# Patient Record
Sex: MALE | Race: WHITE | Employment: OTHER | URBAN - METROPOLITAN AREA
[De-identification: names, ages, dates, MRNs, and addresses within clinical notes are randomized per-mention and may not be internally consistent; named-entity substitution may affect disease eponyms.]

---

## 2017-09-20 ENCOUNTER — APPOINTMENT (OUTPATIENT)
Dept: GENERAL RADIOLOGY | Age: 77
End: 2017-09-20
Attending: EMERGENCY MEDICINE
Payer: MEDICARE

## 2017-09-20 ENCOUNTER — HOSPITAL ENCOUNTER (EMERGENCY)
Age: 77
Discharge: HOME | End: 2017-09-20
Attending: EMERGENCY MEDICINE
Payer: MEDICARE

## 2017-09-20 VITALS
SYSTOLIC BLOOD PRESSURE: 191 MMHG | OXYGEN SATURATION: 100 % | HEIGHT: 72 IN | DIASTOLIC BLOOD PRESSURE: 67 MMHG | TEMPERATURE: 97.9 F | RESPIRATION RATE: 16 BRPM | WEIGHT: 220 LBS | BODY MASS INDEX: 29.8 KG/M2 | HEART RATE: 50 BPM

## 2017-09-20 DIAGNOSIS — S81.002A OPEN KNEE WOUND, LEFT, INITIAL ENCOUNTER: Primary | ICD-10-CM

## 2017-09-20 LAB
ANION GAP SERPL CALC-SCNC: 6 MMOL/L (ref 3–18)
BASOPHILS # BLD: 0 K/UL (ref 0–0.06)
BASOPHILS NFR BLD: 0 % (ref 0–2)
BUN SERPL-MCNC: 26 MG/DL (ref 7–18)
BUN/CREAT SERPL: 20 (ref 12–20)
CALCIUM SERPL-MCNC: 9.5 MG/DL (ref 8.5–10.1)
CHLORIDE SERPL-SCNC: 103 MMOL/L (ref 100–108)
CO2 SERPL-SCNC: 30 MMOL/L (ref 21–32)
CREAT SERPL-MCNC: 1.31 MG/DL (ref 0.6–1.3)
CRP SERPL-MCNC: 5.5 MG/DL (ref 0–0.3)
DIFFERENTIAL METHOD BLD: ABNORMAL
EOSINOPHIL # BLD: 0.4 K/UL (ref 0–0.4)
EOSINOPHIL NFR BLD: 6 % (ref 0–5)
ERYTHROCYTE [DISTWIDTH] IN BLOOD BY AUTOMATED COUNT: 14.5 % (ref 11.6–14.5)
ERYTHROCYTE [SEDIMENTATION RATE] IN BLOOD: 77 MM/HR (ref 0–20)
GLUCOSE SERPL-MCNC: 107 MG/DL (ref 74–99)
HCT VFR BLD AUTO: 33.2 % (ref 36–48)
HGB BLD-MCNC: 10.7 G/DL (ref 13–16)
LYMPHOCYTES # BLD: 1.9 K/UL (ref 0.9–3.6)
LYMPHOCYTES NFR BLD: 25 % (ref 21–52)
MCH RBC QN AUTO: 28.5 PG (ref 24–34)
MCHC RBC AUTO-ENTMCNC: 32.2 G/DL (ref 31–37)
MCV RBC AUTO: 88.3 FL (ref 74–97)
MONOCYTES # BLD: 0.6 K/UL (ref 0.05–1.2)
MONOCYTES NFR BLD: 9 % (ref 3–10)
NEUTS SEG # BLD: 4.4 K/UL (ref 1.8–8)
NEUTS SEG NFR BLD: 60 % (ref 40–73)
PLATELET # BLD AUTO: 313 K/UL (ref 135–420)
PMV BLD AUTO: 9.1 FL (ref 9.2–11.8)
POTASSIUM SERPL-SCNC: 3.7 MMOL/L (ref 3.5–5.5)
RBC # BLD AUTO: 3.76 M/UL (ref 4.7–5.5)
SODIUM SERPL-SCNC: 139 MMOL/L (ref 136–145)
WBC # BLD AUTO: 7.3 K/UL (ref 4.6–13.2)

## 2017-09-20 PROCEDURE — 99284 EMERGENCY DEPT VISIT MOD MDM: CPT

## 2017-09-20 PROCEDURE — 73590 X-RAY EXAM OF LOWER LEG: CPT

## 2017-09-20 PROCEDURE — 87040 BLOOD CULTURE FOR BACTERIA: CPT | Performed by: EMERGENCY MEDICINE

## 2017-09-20 PROCEDURE — 86140 C-REACTIVE PROTEIN: CPT | Performed by: EMERGENCY MEDICINE

## 2017-09-20 PROCEDURE — 93971 EXTREMITY STUDY: CPT

## 2017-09-20 PROCEDURE — 85652 RBC SED RATE AUTOMATED: CPT | Performed by: EMERGENCY MEDICINE

## 2017-09-20 PROCEDURE — 85025 COMPLETE CBC W/AUTO DIFF WBC: CPT | Performed by: EMERGENCY MEDICINE

## 2017-09-20 PROCEDURE — 80048 BASIC METABOLIC PNL TOTAL CA: CPT | Performed by: EMERGENCY MEDICINE

## 2017-09-20 RX ORDER — CEPHALEXIN 500 MG/1
500 CAPSULE ORAL 4 TIMES DAILY
Qty: 28 CAP | Refills: 0 | Status: SHIPPED | OUTPATIENT
Start: 2017-09-20 | End: 2017-09-27

## 2017-09-20 RX ORDER — ATENOLOL 25 MG/1
50 TABLET ORAL EVERY EVENING
COMMUNITY

## 2017-09-20 NOTE — ED PROVIDER NOTES
[address]  EMERGENCY DEPARTMENT HISTORY AND PHYSICAL EXAM       Date: 9/20/2017   Patient Name: Tita Jacob   YOB: 1940  Medical Record Number: [id number]    History of Presenting Illness     Chief Complaint   Patient presents with    Leg Pain        History Provided By:  patient    Additional History: 10:45 AM   Tita Jacob is a 67 y.o. male who presents to the emergency department C/O left knee swelling onset a few days ago. Associated symptoms include open wound to left knee that had yellow-sharmin purulent drainage, scabbed, then began draining again today. Reports hx of similar infections in RLE, this is first episode of infection in LLE. Had called Dr. Angely Oglesby (PCP), who spoke to Dr. Fabian Baron (vascular surgeon), who referred pt to ED. Pt is supposed to have appointment with Dr. Darius Gauthier for LLE swelling. Pt denies fever, chills, CP, SOB, leg pain, knee pain, and any other Sx or complaints. Primary Care Provider: Coco Laboy MD   Specialist:    Past History     Past Medical History:   Past Medical History:   Diagnosis Date    ARF (acute renal failure) ([facility] [address] 6/3/2013    Cellulitis     Hypertension     Unspecified sleep apnea         Past Surgical History:   Past Surgical History:   Procedure Laterality Date    HX CATARACT REMOVAL      HX ORTHOPAEDIC      bilateral knee replacments        Family History:   History reviewed. No pertinent family history. Social History:   Social History   Substance Use Topics    Smoking status: Never Smoker    Smokeless tobacco: Never Used    Alcohol use No        Allergies: Allergies   Allergen Reactions    Ambien [Zolpidem] Other (comments)     Hallucinations        Review of Systems   Review of Systems   Constitutional: Negative for chills and fever. Respiratory: Negative for shortness of breath. Cardiovascular: Positive for leg swelling. Negative for chest pain.    Musculoskeletal: Negative for arthralgias (left knee) and myalgias (LLE). Skin: Positive for color change and wound (open and draining to left knee). All other systems reviewed and are negative. Physical Exam  Vitals:    09/20/17 1028   BP: 191/67   Pulse: (!) 50   Resp: 16   Temp: 97.9 °F (36.6 °C)   SpO2: 100%   Weight: 99.8 kg (220 lb)   Height: 6' (1.829 m)       Physical Exam   Nursing note and vitals reviewed. Constitutional: Elderly.  Well appearing, no acute distress  Head: Normocephalic, Atraumatic  Neck: Supple  Chest: Normal work of breathing and chest excursion bilaterally  Back: No evidence of trauma or deformity  Extremities: profound LE edema (left>right), LLE medial anterior shin has dime sized area of scabbing with drainage of serous fluid and slight surrounding edema  Skin: Warm and dry  Neuro: Alert and appropriate  Psychiatric: Normal mood and affect        Diagnostic Study Results     Labs -      Recent Results (from the past 12 hour(s))   SED RATE (ESR)    Collection Time: 09/20/17 11:07 AM   Result Value Ref Range    Sed rate, automated 77 (H) 0 - 20 mm/hr   METABOLIC PANEL, BASIC    Collection Time: 09/20/17 11:09 AM   Result Value Ref Range    Sodium 139 136 - 145 mmol/L    Potassium 3.7 3.5 - 5.5 mmol/L    Chloride 103 100 - 108 mmol/L    CO2 30 21 - 32 mmol/L    Anion gap 6 3.0 - 18 mmol/L    Glucose 107 (H) 74 - 99 mg/dL    BUN 26 (H) 7.0 - 18 MG/DL    Creatinine 1.31 (H) 0.6 - 1.3 MG/DL    BUN/Creatinine ratio 20 12 - 20      GFR est AA >60 >60 ml/min/1.73m2    GFR est non-AA 53 (L) >60 ml/min/1.73m2    Calcium 9.5 8.5 - 10.1 MG/DL   CBC WITH AUTOMATED DIFF    Collection Time: 09/20/17 11:09 AM   Result Value Ref Range    WBC 7.3 4.6 - 13.2 K/uL    RBC 3.76 (L) 4.70 - 5.50 M/uL    HGB 10.7 (L) 13.0 - 16.0 g/dL    HCT 33.2 (L) 36.0 - 48.0 %    MCV 88.3 74.0 - 97.0 FL    MCH 28.5 24.0 - 34.0 PG    MCHC 32.2 31.0 - 37.0 g/dL    RDW 14.5 11.6 - 14.5 %    PLATELET 950 521 - 914 K/uL    MPV 9.1 (L) 9.2 - 11.8 FL    NEUTROPHILS 60 40 - 73 %    LYMPHOCYTES 25 21 - 52 %    MONOCYTES 9 3 - 10 %    EOSINOPHILS 6 (H) 0 - 5 %    BASOPHILS 0 0 - 2 %    ABS. NEUTROPHILS 4.4 1.8 - 8.0 K/UL    ABS. LYMPHOCYTES 1.9 0.9 - 3.6 K/UL    ABS. MONOCYTES 0.6 0.05 - 1.2 K/UL    ABS. EOSINOPHILS 0.4 0.0 - 0.4 K/UL    ABS. BASOPHILS 0.0 0.0 - 0.06 K/UL    DF AUTOMATED         Radiologic Studies -  The following have been ordered and reviewed:   DUPLEX LOWER EXT VENOUS LEFT   INTERPRETATION/FINDINGS    Left le. Deep vein(s) visualized include the common femoral, deep femoral, proximal femoral, mid femoral, distal femoral, popliteal(above knee), popliteal(fossa) and popliteal(below knee) veins. 2. No evidence of deep venous thrombosis detected in the veins visualized. 3. No evidence of deep vein thrombosis in the contralateral common femoral vein. 4. Superficial vein(s) visualized include the great saphenous vein. 5. No evidence of superficial thrombosis detected. 6. Left calf vessels not seen secondary to swelling     ADDITIONAL COMMENTS  Incidental finding: Prominent left groin lymph node noted measuring  4.6 x 2.9 x 1.6cm    As read by the radiologist.       XR TIB/FIB LT   Final Result   IMPRESSION:     1. Evidence of total knee arthroplasty since 2011. Diffuse soft tissue swelling with additional abnormal perihardware lucency and ill-defined cortex suspicious for infection, especially given history of drainage. No postoperative comparison studies. Clinical correlation is recommended.     2. No fracture or dislocation. As read by the radiologist.            Medical Decision Making   I am the first provider for this patient. I reviewed the vital signs, available nursing notes, past medical history, past surgical history, family history and social history. Vital Signs-Reviewed the patient's vital signs.    Patient Vitals for the past 12 hrs:   Temp Pulse Resp BP SpO2   17 1028 97.9 °F (36.6 °C) (!) 50 16 191/67 100 %       Pulse Oximetry Analysis - Normal 100% on room air     Old Medical Records: Nursing notes. Procedures:   Procedures    ED Course:  10:45 AM  Initial assessment performed. The patients presenting problems have been discussed, and they are in agreement with the care plan formulated and outlined with them. I have encouraged them to ask questions as they arise throughout their visit. 12:12 PM Discussed with radiologist pts imaging. There is concern for infection around pts hardware in place. Will consult pts orthopedic surgeon. 12:43 PM Discussed patient's history, exam, and available diagnostics results with Aide Ponce PA-C on call for Dr. Jessica Luz (orthopedic surgeon), who will review images with Dr. Julieth Delacruz and call back. 12:54 PM Discussed patient's history, exam, and available diagnostics results with Dr. Jessica Luz, orthopedic surgeon, who states that he has reviewed films and is okay with discharging pt to f/u with his office tomorrow for wound evaluation. Medications Given in the ED:  Medications - No data to display    Discharge Note:  1:21 PM  Pt has been reexamined. Patient has no new complaints, changes, or physical findings. Care plan outlined and precautions discussed. Results were reviewed with the patient. All medications were reviewed with the patient; will d/c home with Keflex. All of pt's questions and concerns were addressed. Patient was instructed and agrees to follow up with Dr. Jessica Luz (ortho), as well as to return to the ED upon further deterioration. Patient is ready to go home. Diagnosis   Clinical Impression:   1. Open knee wound, left, initial encounter         Discussion:  66 y/o male with leg wound. Lab work benign. Imaging concerning for lucency around hardware. Dicussed extensively with orthopedics. Plan for discharge with orthopedic follow up tomorrow in Dr. Jessica Luz office.      Follow-up Information Follow up With Details Comments Contact Info    Francis Gottron, MD Schedule an appointment as soon as possible for a visit For follow up with orthopedics [address]      [facility] EMERGENCY DEPT Go to As needed, if symptoms worsen [address] [address]  [phone]          Current Discharge Medication List      START taking these medications    Details   cephALEXin (KEFLEX) 500 mg capsule Take 1 Cap by mouth four (4) times daily for 7 days. Qty: 28 Cap, Refills: 0             _______________________________   Attestations: This note is prepared by Matthew Pang, acting as a Scribe for Mauro Pryor MD on 10:29 AM on 9/20/2017. Mauro Pryor MD: The scribe's documentation has been prepared under my direction and personally reviewed by me in its entirety.   _______________________________

## 2017-09-20 NOTE — DISCHARGE INSTRUCTIONS
Wound Check: Care Instructions  Your Care Instructions  People have wounds that need care for many reasons. You may have a cut that needs care after surgery. You may have a cut or puncture wound from an accident. Or you may have a wound because of a condition like diabetes. Whatever the cause of your wound, there are things you can do to care for it at home. Your doctor may also want you to come back for a wound check. The wound check lets the doctor know how your wound is healing and if you need more treatment. Follow-up care is a key part of your treatment and safety. Be sure to make and go to all appointments, and call your doctor if you are having problems. It's also a good idea to know your test results and keep a list of the medicines you take. How can you care for yourself at home? · If your doctor told you how to care for your wound, follow your doctor's instructions. If you did not get instructions, follow this general advice:  ¨ You may cover the wound with a thin layer of petroleum jelly, such as Vaseline, and a nonstick bandage. ¨ Apply more petroleum jelly and replace the bandage as needed. · Keep the wound dry for the first 24 to 48 hours. After this, you can shower if your doctor okays it. Pat the wound dry. · Be safe with medicines. Read and follow all instructions on the label. ¨ If the doctor gave you a prescription medicine for pain, take it as prescribed. ¨ If you are not taking a prescription pain medicine, ask your doctor if you can take an over-the-counter medicine. · If your doctor prescribed antibiotics, take them as directed. Do not stop taking them just because you feel better. You need to take the full course of antibiotics. · If you have stitches, do not remove them on your own. Your doctor will tell you when to come back to have them removed. · If you have Steri-Strips, leave them on until they fall off.   · If possible, prop up the injured area on a pillow anytime you sit or lie down during the next 3 days. Try to keep it above the level of your heart. This will help reduce swelling. When should you call for help? Call your doctor now or seek immediate medical care if:  · You have new pain, or the pain gets worse. · The skin near the wound is cold or pale or changes color. · You have tingling, weakness, or numbness near the wound. · The wound starts to bleed, and blood soaks through the bandage. Oozing small amounts of blood is normal.  · You have symptoms of infection, such as:  ¨ Increased pain, swelling, warmth, or redness. ¨ Red streaks leading from the wound. ¨ Pus draining from the wound. ¨ A fever. Watch closely for changes in your health, and be sure to contact your doctor if:  · You do not get better as expected. Where can you learn more? Go to http://ronnySnooth Mediarajwinder.info/. Enter P342 in the search box to learn more about \"Wound Check: Care Instructions. \"  Current as of: March 20, 2017  Content Version: 11.3  © 9967-2507 [employer]. Care instructions adapted under license by [employer] (which disclaims liability or warranty for this information). If you have questions about a medical condition or this instruction, always ask your healthcare professional. [address] any warranty or liability for your use of this information. Skin Tears: Care Instructions  Your Care Instructions  As we get older, our skin gets drier and more fragile. Sometimes this can cause the outer layers of skin to split and tear open. Skin tears are treated in different ways. In some cases, doctors use pieces of tape called Steri-Strips to pull the skin together and help it heal. Other times, it's best to leave the tear open and cover it with a special wound-care bandage. Skin tears are usually not serious. They usually heal in a few weeks.  But how long you take to heal depends on your body and the type of tear you have. Sometimes the torn piece of skin is used to protect the wound while it heals. But that piece of skin does not heal. It may fall off on its own. Or the doctor may remove it. As your tear heals, it's important to keep it clean to help prevent infection. The doctor has checked you carefully, but problems can develop later. If you notice any problems or new symptoms, get medical treatment right away. Follow-up care is a key part of your treatment and safety. Be sure to make and go to all appointments, and call your doctor if you are having problems. It's also a good idea to know your test results and keep a list of the medicines you take. How can you care for yourself at home? · If you have pain, ask your doctor if you can take an over-the-counter pain medicine, such as acetaminophen (Tylenol), ibuprofen (Advil, Motrin), or naproxen (Aleve). Be safe with medicines. Read and follow all instructions on the label. · If you have a bandage, follow your doctor's instructions for changing it. · If you have Steri-Strips, leave them on until they fall off. · Follow your doctor's instructions about bathing. · Gently wash the skin tear with plain water 2 times a day. Do not rub the area. · Let the area air dry. Or you can pat it carefully with a soft towel. When should you call for help? Call your doctor now or seek immediate medical care if:  · You have signs of infection, such as:  ¨ Increased pain, swelling, warmth, or redness around the tear. ¨ Red streaks leading from the tear. ¨ Pus draining from the tear. ¨ A fever. · The tear starts to bleed a lot. Small amounts of blood are normal.  Watch closely for changes in your health, and be sure to contact your doctor if:  · You do not get better as expected. Where can you learn more? Go to http://ronny-rajwinder.info/. Enter O051 in the search box to learn more about \"Skin Tears: Care Instructions. \"  Current as of: March 20, 2017  Content Version: 11.3  © 5676-4468 [employer]. Care instructions adapted under license by [employer] (which disclaims liability or warranty for this information). If you have questions about a medical condition or this instruction, always ask your healthcare professional. [address] any warranty or liability for your use of this information.

## 2017-09-20 NOTE — ED TRIAGE NOTES
Left leg swelling and wound with purulent draining. Sepsis Screening completed    (  )Patient meets SIRS criteria. ( x )Patient does not meet SIRS criteria.       SIRS Criteria is achieved when two or more of the following are present   Temperature < 96.8°F (36°C) or > 100.9°F (38.3°C)   Heart Rate > 90 beats per minute   Respiratory Rate > 20 breaths per minute   WBC count > 12,000 or <4,000 or > 10% bands

## 2017-09-20 NOTE — PROCEDURES
[facility]  *** FINAL REPORT ***    Name: Cruz Palacios  MRN: [id number]    Outpatient  : 1940  HIS Order #: [id number]  [address] [facility] Visit #: [id number]  Date: 20 Sep 2017    TYPE OF TEST: Peripheral Venous Testing    REASON FOR TEST  Limb swelling    Left Leg:-  Deep venous thrombosis:           No  Superficial venous thrombosis:    No  Deep venous insufficiency:        Not examined  Superficial venous insufficiency: Not examined      INTERPRETATION/FINDINGS  Duplex images were obtained using 2-D gray scale, color flow, and  spectral Doppler analysis. Left leg :  1. Deep vein(s) visualized include the common femoral, deep femoral,  proximal femoral, mid femoral, distal femoral, popliteal(above knee),  popliteal(fossa) and popliteal(below knee) veins. 2. No evidence of deep venous thrombosis detected in the veins  visualized. 3. No evidence of deep vein thrombosis in the contralateral common  femoral vein. 4. Superficial vein(s) visualized include the great saphenous vein. 5. No evidence of superficial thrombosis detected. 6. Left calf vessels not seen secondary to swelling    ADDITIONAL COMMENTS  Incidental finding: Prominent left groin lymph node noted measuring  4.6 x 2.9 x 1.6cm    I have personally reviewed the data relevant to the interpretation of  this  study.     TECHNOLOGIST: Nesha Dunlap  Signed: 2017 01:18 PM    PHYSICIAN: Bishop Cartwright MD  Signed: 2017 03:19 PM

## 2017-09-26 LAB
BACTERIA SPEC CULT: NORMAL
SERVICE CMNT-IMP: NORMAL

## 2017-09-27 ENCOUNTER — OFFICE VISIT (OUTPATIENT)
Dept: VASCULAR SURGERY | Age: 77
End: 2017-09-27

## 2017-09-27 VITALS
HEIGHT: 72 IN | BODY MASS INDEX: 29.8 KG/M2 | WEIGHT: 220 LBS | RESPIRATION RATE: 16 BRPM | DIASTOLIC BLOOD PRESSURE: 70 MMHG | SYSTOLIC BLOOD PRESSURE: 132 MMHG | HEART RATE: 68 BPM

## 2017-09-27 DIAGNOSIS — I89.0 OTHER LYMPHEDEMA: Primary | ICD-10-CM

## 2017-09-27 DIAGNOSIS — S81.802A LEG WOUND, LEFT, INITIAL ENCOUNTER: ICD-10-CM

## 2017-09-27 DIAGNOSIS — M79.89 LEG SWELLING: ICD-10-CM

## 2017-09-27 NOTE — PROGRESS NOTES
Naren Clayton    Chief Complaint   Patient presents with    Swelling       History and Physical    Mr. Donavan Blizzard is a 65-year-old gentleman referred to our office for evaluation of bilateral leg swelling. He states that he began to have swelling in his bilateral legs after and with knee replacements in 2011. Since that time he is been dealing with leg swelling that would cause much discomfort are recently he has developed a wound on his left leg. He states that he saw his orthopedic surgeon and would x-ray where he said \"this does not extend down to bone\". Mr. Donavan Blizzard presented to the emergency room when he developed this wound was placed on Keflex. He denies any fevers or chills and states that he does have some drainage from his wound area. He has also been wearing double layer Tubigrip and he had some compression stockings before but he has difficulty putting them on. He denies any fevers or chills he denies history of injury of the knee replacement of his legs and he denies any history of deep venous thrombosis. Past Medical History:   Diagnosis Date    ARF (acute renal failure) ([facility] [address] 6/3/2013    Cellulitis     Hypertension     Unspecified sleep apnea      Patient Active Problem List   Diagnosis Code    Cellulitis and abscess of leg, except foot L02.419, L03.119    HTN (hypertension) I10    Glaucoma H40.9    Unspecified sleep apnea G47.30    [facility] failure[facility] ([facility] N17.9    Chronic venous insufficiency I87.2    Cellulitis of leg, right L03.115     Past Surgical History:   Procedure Laterality Date    HX CATARACT REMOVAL      HX ORTHOPAEDIC      bilateral knee replacments     Current Outpatient Prescriptions   Medication Sig Dispense Refill    atenolol (TENORMIN) 25 mg tablet Take 25 mg by mouth daily.  cephALEXin (KEFLEX) 500 mg capsule Take 1 Cap by mouth four (4) times daily for 7 days.  28 Cap 0    bimatoprost (LUMIGAN) 0.03 % ophthalmic drops Administer 1 drop to both eyes every evening.  aspirin (BABY ASPIRIN) 81 mg chewable tablet Take 81 mg by mouth daily.  telmisartan-hydrochlorothiazide (MICARDIS HCT) 80-25 mg per tablet Take 1 Tab by mouth daily. Indications: HYPERTENSION      potassium chloride SR (KLOR-CON 10) 10 mEq tablet Take 10 mEq by mouth two (2) times a day.  furosemide (LASIX) 20 mg tablet Take 40 mg by mouth daily. Indications: HYPERTENSION      niacin (NIASPAN) 1,000 mg Tb24 tab Take 1,000 mg by mouth nightly.  multivitamin (ONE A DAY) tablet Take 1 Tab by mouth daily.  calcium-cholecalciferol, d3, (CALCIUM 600 + D) 600-125 mg-unit Tab Take  by mouth. Allergies   Allergen Reactions    Ambien [Zolpidem] Other (comments)     Hallucinations     Social History     Social History    Marital status: UNKNOWN     Spouse name: N/A    Number of children: N/A    Years of education: N/A     Occupational History    Not on file. Social History Main Topics    Smoking status: Never Smoker    Smokeless tobacco: Never Used    Alcohol use No    Drug use: No    Sexual activity: Not on file     Other Topics Concern    Not on file     Social History Narrative      No family history on file. Review of Systems    Review of Systems   Constitutional: Negative for chills, diaphoresis, fever, malaise/fatigue and weight loss. HENT: Negative for hearing loss and sore throat. Eyes: Negative for blurred vision, photophobia and redness. Respiratory: Negative for cough, hemoptysis, shortness of breath and wheezing. Cardiovascular: Positive for leg swelling. Negative for chest pain, palpitations and orthopnea. Gastrointestinal: Negative for abdominal pain, blood in stool, constipation, diarrhea, heartburn, nausea and vomiting. Genitourinary: Negative for dysuria, frequency, hematuria and urgency. Musculoskeletal: Positive for joint pain. Negative for back pain and myalgias.    Skin: Negative for itching and rash.   Neurological: Negative for dizziness, speech change, focal weakness, weakness and headaches. Endo/Heme/Allergies: Does not bruise/bleed easily. Psychiatric/Behavioral: Negative for depression and suicidal ideas. Physical Exam:    Visit Vitals    /70 (BP 1 Location: Left arm, BP Patient Position: Sitting)    Pulse 68    Resp 16    Ht 6' (1.829 m)    Wt 220 lb (99.8 kg)    BMI 29.84 kg/m2      Physical Examination: General appearance - alert, well appearing, and in no distress  Mental status - alert, oriented to person, place, and time  Eyes - sclera anicteric, left eye normal, right eye normal  Ears - right ear normal, left ear normal  Nose - normal and patent, no erythema, discharge or polyps  Mouth - mucous membranes moist, pharynx normal without lesions  Neck - supple, no significant adenopathy  Lymphatics - no palpable lymphadenopathy  Chest - clear to auscultation, no wheezes, rales or rhonchi, symmetric air entry  Heart - normal rate and regular rhythm  Abdomen - soft, nontender, nondistended, no masses or organomegaly  Extremities -2+-3+ left lower extremity edema extending up into the thigh. Edema extends onto the foot. There is areas of lichenification along the medial left ankle area. There is a blister type of skin growth on his anterior left knee just below his knee replacement incision. Unable to probe this wound any deeper than superficially. There was some bleeding that responded to silver nitrate. Right lower extremity with edema extending to thigh 2+. Bilateral pitting edema. Also extends of the foot and toes. Impression and Plan:    ICD-10-CM ICD-9-CM    1. Other lymphedema I89.0 457.1    2. Leg swelling M79.89 729.81    3. Leg wound, left, initial encounter S81.802A 894.0      I told Mr. Luh Sarkar that I do believe his leg swelling from lymphedema. He developed these this leg swelling after undergoing knee replacement, and has no history of DVTs. Also getting appearance and weight extends on the foot is highly suspicious for lymphedema. We are going to investigate whether or not he will be a candidate for a unable to place his left leg and Unna boot compression help with his wound we need to continue to monitor this area as this wound does not look like her typical lymphedema wound is suspicious for either a deep infection with a sinus strength the skin or some kind of abnormal skin growth. Both of these things would be concerning and suspicious given knee replacement history and also with prolonged leg swelling which social lymphedema could lead to a skin malignancy. We will also obtain compression stockings have been compression from his toes up to his thigh. We will see Mr. Shelby 2 weeks time to reevaluate his wounds and also check his leg symptoms. Follow-up Disposition:  Return in about 2 weeks (around 10/11/2017) for Wound check, Symptom check. The treatment plan was reviewed with the patient in detail. The patient voiced understanding of this plan and all questions and concerns were addressed. The patient agrees with this plan. We discussed the signs and symptoms that would require earlier attention or intervention. The patient was given educational material related to his/her visit and the patient has voiced understanding of the material.     I appreciate the opportunity to participate in the care of your patient. I will be sure to keep you informed of any subsequent changes in the treatment plan. If you have any questions or concerns, please feel free to contact me. Gold Moore MD    PLEASE NOTE:  This document has been produced using voice recognition software. Unrecognized errors in transcription may be present.

## 2017-09-27 NOTE — MR AVS SNAPSHOT
Visit Information Date & Time Provider Department Dept. Phone Encounter #  
 9/27/2017  9:30 AM Balbina Orourke MD [facility] [address] [id number] Your Appointments 10/18/2017  9:30 AM  
Follow Up with Balbina Orourke MD  
[facility] THE [facility] ([address]) Appt Note: 2 week FU no studies [place] [id number] Upcoming Health Maintenance Date Due DTaP/Tdap/Td series (1 - Tdap) 11/27/1960 ZOSTER VACCINE AGE 60> 9/27/1999 MEDICARE YEARLY EXAM 11/27/2004 Pneumococcal 65+ High/Highest Risk (2 of 2 - PPSV23) 1/2/2015 GLAUCOMA SCREENING Q2Y 1/13/2015 INFLUENZA AGE 9 TO ADULT 8/1/2017 Allergies as of 9/27/2017  Review Complete On: 9/27/2017 By: Ros Menezes RN Severity Noted Reaction Type Reactions Ambien [Zolpidem]  01/13/2013    Other (comments) Hallucinations Current Immunizations  Reviewed on 11/21/2014 Name Date Influenza Vaccine 10/15/2014 Pneumococcal Vaccine (Unspecified Type) 1/2/2010 Not reviewed this visit Vitals BP Pulse Resp Height(growth percentile) Weight(growth percentile) BMI  
 132/70 (BP 1 Location: Left arm, BP Patient Position: Sitting) 68 16 6' (1.829 m) 220 lb (99.8 kg) 29.84 kg/m2 Smoking Status Never Smoker BMI and BSA Data Body Mass Index Body Surface Area  
 29.84 kg/m 2 2.25 m 2 Your Updated Medication List  
  
   
This list is accurate as of: 9/27/17 10:22 AM.  Always use your most recent med list.  
  
  
  
  
 atenolol 25 mg tablet Commonly known as:  TENORMIN Take 25 mg by mouth daily. BABY ASPIRIN 81 mg chewable tablet Generic drug:  aspirin Take 81 mg by mouth daily. CALCIUM 600 + D 600-125 mg-unit Tab Generic drug:  calcium-cholecalciferol (d3) Take  by mouth. cephALEXin 500 mg capsule Commonly known as:  oH Roblero Take 1 Cap by mouth four (4) times daily for 7 days. LASIX 20 mg tablet Generic drug:  furosemide Take 40 mg by mouth daily. Indications: HYPERTENSION  
  
 LUMIGAN 0.03 % ophthalmic drops Generic drug:  bimatoprost  
Administer 1 drop to both eyes every evening. MICARDIS HCT 80-25 mg per tablet Generic drug:  telmisartan-hydroCHLOROthiazide Take 1 Tab by mouth daily. Indications: HYPERTENSION  
  
 multivitamin tablet Commonly known as:  ONE A DAY Take 1 Tab by mouth daily. niacin 1,000 mg Tb24 tab Commonly known as:  [place] Glance Take 1,000 mg by mouth nightly. potassium chloride SR 10 mEq tablet Commonly known as:  KLOR-CON 10 Take 10 mEq by mouth two (2) times a day. Introducing [facility] SERVICES! Dear Fab Orr: Thank you for requesting a [email or web address] account. Our records indicate that you already have an active [email or web address] account. You can access your account anytime at [email or web address] Did you know that you can access your hospital and ER discharge instructions at any time in [email or web address]? You can also review all of your test results from your hospital stay or ER visit. Additional Information If you have questions, please visit the Frequently Asked Questions section of the [email or web address] website at [email or web address] Remember, [email or web address] is NOT to be used for urgent needs. For medical emergencies, dial 911. Now available from your iPhone and Android! Please provide this summary of care documentation to your next provider. Your primary care clinician is listed as Jami Or. If you have any questions after today's visit, please call [phone].

## 2017-10-18 ENCOUNTER — OFFICE VISIT (OUTPATIENT)
Dept: VASCULAR SURGERY | Age: 77
End: 2017-10-18

## 2017-10-18 VITALS
DIASTOLIC BLOOD PRESSURE: 70 MMHG | SYSTOLIC BLOOD PRESSURE: 130 MMHG | RESPIRATION RATE: 16 BRPM | BODY MASS INDEX: 29.8 KG/M2 | HEART RATE: 68 BPM | WEIGHT: 220 LBS | HEIGHT: 72 IN

## 2017-10-18 DIAGNOSIS — Z96.653 H/O TOTAL KNEE REPLACEMENT, BILATERAL: ICD-10-CM

## 2017-10-18 DIAGNOSIS — I87.2 CHRONIC VENOUS INSUFFICIENCY: Primary | ICD-10-CM

## 2017-10-18 DIAGNOSIS — I89.0 LYMPHEDEMA OF BOTH LOWER EXTREMITIES: ICD-10-CM

## 2017-10-18 NOTE — MR AVS SNAPSHOT
Visit Information Date & Time Provider Department Dept. Phone Encounter #  
 10/18/2017  9:30 AM Lenka Whiteside MD [facility] [address] [id number] Your Appointments 11/21/2017  9:30 AM  
Follow Up with Lenka Whiteside MD  
[facility] Appt Note: 1 month FU no studies [place] [id number] Upcoming Health Maintenance Date Due DTaP/Tdap/Td series (1 - Tdap) 11/27/1960 ZOSTER VACCINE AGE 60> 9/27/1999 MEDICARE YEARLY EXAM 11/27/2004 Pneumococcal 65+ High/Highest Risk (2 of 2 - PPSV23) 1/2/2015 GLAUCOMA SCREENING Q2Y 1/13/2015 INFLUENZA AGE 9 TO ADULT 8/1/2017 Allergies as of 10/18/2017  Review Complete On: 10/18/2017 By: Nando Connor RN Severity Noted Reaction Type Reactions Ambien [Zolpidem]  01/13/2013    Other (comments) Hallucinations Current Immunizations  Reviewed on 11/21/2014 Name Date Influenza Vaccine 10/15/2014 Pneumococcal Vaccine (Unspecified Type) 1/2/2010 Not reviewed this visit Vitals BP Pulse Resp Height(growth percentile) Weight(growth percentile) BMI  
 130/70 (BP 1 Location: Left arm, BP Patient Position: Sitting) 68 16 6' (1.829 m) 220 lb (99.8 kg) 29.84 kg/m2 Smoking Status Never Smoker BMI and BSA Data Body Mass Index Body Surface Area  
 29.84 kg/m 2 2.25 m 2 Your Updated Medication List  
  
   
This list is accurate as of: 10/18/17  9:51 AM.  Always use your most recent med list.  
  
  
  
  
 atenolol 25 mg tablet Commonly known as:  TENORMIN Take 25 mg by mouth daily. BABY ASPIRIN 81 mg chewable tablet Generic drug:  aspirin Take 81 mg by mouth daily. CALCIUM 600 + D 600-125 mg-unit Tab Generic drug:  calcium-cholecalciferol (d3) Take  by mouth. LASIX 20 mg tablet Generic drug:  furosemide Take 40 mg by mouth daily. Indications: HYPERTENSION  
  
 LUMIGAN 0.03 % ophthalmic drops Generic drug:  bimatoprost  
Administer 1 drop to both eyes every evening. MICARDIS HCT 80-25 mg per tablet Generic drug:  telmisartan-hydroCHLOROthiazide Take 1 Tab by mouth daily. Indications: HYPERTENSION  
  
 multivitamin tablet Commonly known as:  ONE A DAY Take 1 Tab by mouth daily. niacin 1,000 mg Tb24 tab Commonly known as:  [place]ons[place] Hammers Take 1,000 mg by mouth nightly. potassium chloride SR 10 mEq tablet Commonly known as:  KLOR-CON 10 Take 10 mEq by mouth two (2) times a day. Introducing [facility]! Dear Bryan Brower: Thank you for requesting a [employer] account. Our records indicate that you already have an active [employer] account. You can access your account anytime at [email or web address]. [email or web address] Did you know that you can access your hospital and ER discharge instructions at any time in [employer]? You can also review all of your test results from your hospital stay or ER visit. Additional Information If you have questions, please visit the Frequently Asked Questions section of the [employer] website at [email or web address] Remember, [employer] is NOT to be used for urgent needs. For medical emergencies, dial 911. Now available from your iPhone and Android! Please provide this summary of care documentation to your next provider. Your primary care clinician is listed as Alicia Cevallos. If you have any questions after today's visit, please call [phone].

## 2017-10-19 NOTE — PROGRESS NOTES
[facility]    Chief Complaint   Patient presents with    Leg Pain    Swelling       History and Physical    Mr. Wellington Diehl returns to our office for follow up of his bilateral leg swelling. He states that his leg lesion on the left had completely closed and was doing well up until this Monday when it reappeared and began to drain. He denies fevers or chills. He has met with the lymphedema clinic and is scheduled to start soon. Past Medical History:   Diagnosis Date    ARF (acute renal failure) ([facility] [address] 6/3/2013    Cellulitis     Hypertension     Unspecified sleep apnea      Patient Active Problem List   Diagnosis Code    Cellulitis and abscess of leg, except foot L03.119, L02.419    HTN (hypertension) I10    Glaucoma H40.9    Unspecified sleep apnea G47.30    ARF (acute renal failure) ([facility]) N17.9    Chronic venous insufficiency I87.2    Cellulitis of leg, right L03.115     Past Surgical History:   Procedure Laterality Date    HX CATARACT REMOVAL      HX ORTHOPAEDIC      bilateral knee replacments     Current Outpatient Prescriptions   Medication Sig Dispense Refill    atenolol (TENORMIN) 25 mg tablet Take 25 mg by mouth daily.  bimatoprost (LUMIGAN) 0.03 % ophthalmic drops Administer 1 drop to both eyes every evening.  aspirin (BABY ASPIRIN) 81 mg chewable tablet Take 81 mg by mouth daily.  telmisartan-hydrochlorothiazide (MICARDIS HCT) 80-25 mg per tablet Take 1 Tab by mouth daily. Indications: HYPERTENSION      potassium chloride SR (KLOR-CON 10) 10 mEq tablet Take 10 mEq by mouth two (2) times a day.  furosemide (LASIX) 20 mg tablet Take 40 mg by mouth daily. Indications: HYPERTENSION      niacin (NIASPAN) 1,000 mg Tb24 tab Take 1,000 mg by mouth nightly.  multivitamin (ONE A DAY) tablet Take 1 Tab by mouth daily.  calcium-cholecalciferol, d3, (CALCIUM 600 + D) 600-125 mg-unit Tab Take  by mouth.          Allergies   Allergen Reactions    Ambien [Zolpidem] Other (comments)     Hallucinations     Social History     Social History    Marital status: UNKNOWN     Spouse name: N/A    Number of children: N/A    Years of education: N/A     Occupational History    Not on file. Social History Main Topics    Smoking status: Never Smoker    Smokeless tobacco: Never Used    Alcohol use No    Drug use: No    Sexual activity: Not on file     Other Topics Concern    Not on file     Social History Narrative      Family History   Problem Relation Age of Onset    Cancer Mother     Heart Disease Father     Cancer Sister     Hypertension Sister        Review of Systems    Review of Systems   Constitutional: Negative for chills, diaphoresis, fever, malaise/fatigue and weight loss. HENT: Negative for hearing loss and sore throat. Eyes: Negative for blurred vision, photophobia and redness. Respiratory: Negative for cough, hemoptysis, shortness of breath and wheezing. Cardiovascular: Positive for leg swelling. Negative for chest pain, palpitations and orthopnea. Gastrointestinal: Negative for abdominal pain, blood in stool, constipation, diarrhea, heartburn, nausea and vomiting. Genitourinary: Negative for dysuria, frequency, hematuria and urgency. Musculoskeletal: Negative for back pain and myalgias. Skin: Negative for itching and rash. Neurological: Negative for dizziness, speech change, focal weakness, weakness and headaches. Endo/Heme/Allergies: Does not bruise/bleed easily. Psychiatric/Behavioral: Negative for depression and suicidal ideas.             Physical Exam:    Visit Vitals    /70 (BP 1 Location: Left arm, BP Patient Position: Sitting)    Pulse 68    Resp 16    Ht 6' (1.829 m)    Wt 220 lb (99.8 kg)    BMI 29.84 kg/m2      Physical Examination: General appearance - alert, well appearing, and in no distress  Mental status - alert, oriented to person, place, and time  Eyes - sclera anicteric, left eye normal, right eye normal  Ears - right ear normal, left ear normal  Nose - normal and patent, no erythema, discharge or polyps  Mouth - mucous membranes moist, pharynx normal without lesions  Extremities - Bilateral lower extremity edema with left leg 3+ pitting edema, left leg 2+. Recurrent left leg lesion just inferior to knee replacement incision now with tunneling. No purulent drainage. No surrounding edema. No other wounds, ulcers or lesions. Impression and Plan:    ICD-10-CM ICD-9-CM    1. Chronic venous insufficiency I87.2 459.81    2. Lymphedema of both lower extremities I89.0 457.1    3. 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 Yvan Pyle that I am even more concerned about a possible sinus tract in his leg. I have alerted Dr. Carlos BRADSHAW about our findings in office today. She was very proactive and Dr. Carlos [place] office will expedite getting Mr. Shelby into their office. We told Mr. Yvan Pyle to hold off on lymphedema therapy for now and we will recheck his leg in 1 month. Follow-up Disposition:  Return in about 4 weeks (around 11/15/2017) for Wound check, Symptom check. The treatment plan was reviewed with the patient in detail. The patient voiced understanding of this plan and all questions and concerns were addressed. The patient agrees with this plan. We discussed the signs and symptoms that would require earlier attention or intervention. The patient was given educational material related to his/her visit and the patient has voiced understanding of the material.     I appreciate the opportunity to participate in the care of your patient. I will be sure to keep you informed of any subsequent changes in the treatment plan. If you have any questions or concerns, please feel free to contact me. Karina Jim MD    PLEASE NOTE:  This document has been produced using voice recognition software.  Unrecognized errors in transcription may be present.

## 2017-10-26 ENCOUNTER — HOSPITAL ENCOUNTER (OUTPATIENT)
Dept: NUCLEAR MEDICINE | Age: 77
Discharge: HOME | End: 2017-10-26
Attending: ORTHOPAEDIC SURGERY
Payer: MEDICARE

## 2017-10-26 DIAGNOSIS — M79.662 PAIN OF LEFT LOWER LEG: ICD-10-CM

## 2017-10-26 PROCEDURE — 78315 BONE IMAGING 3 PHASE: CPT

## 2017-11-21 ENCOUNTER — OFFICE VISIT (OUTPATIENT)
Dept: VASCULAR SURGERY | Age: 77
End: 2017-11-21

## 2017-11-21 VITALS
HEIGHT: 72 IN | DIASTOLIC BLOOD PRESSURE: 70 MMHG | WEIGHT: 220 LBS | RESPIRATION RATE: 18 BRPM | BODY MASS INDEX: 29.8 KG/M2 | HEART RATE: 70 BPM | SYSTOLIC BLOOD PRESSURE: 132 MMHG

## 2017-11-21 DIAGNOSIS — I87.2 CHRONIC VENOUS INSUFFICIENCY: Primary | ICD-10-CM

## 2017-11-21 NOTE — PROGRESS NOTES
Katty Carballo    Chief Complaint   Patient presents with    Swelling    Leg Pain       History and Physical    Mr. Catherne Harada returns to our office for continued follow up of his bilateral lower extremity swelling. He states since his last visit he has visited his orthopedic surgeon who has recommended that he undergo a revision of his left knee replacement at [facility]. He is apprehensive to pursue this option due to social issues. He states that his leg wound has been draining more frequently but his leg swelling has decreased somewhat. Past Medical History:   Diagnosis Date    ARF (acute renal failure) ([facility] [address] 6/3/2013    Cellulitis     Hypertension     Unspecified sleep apnea      Patient Active Problem List   Diagnosis Code    Cellulitis and abscess of leg, except foot L03.119, L02.419    HTN (hypertension) I10    Glaucoma H40.9    Unspecified sleep apnea G47.30    ARF [facility]acute renal failure) ([facility]) N17.9    Chronic venous insufficiency I87.2    Cellulitis of leg, right L03.115     Past Surgical History:   Procedure Laterality Date    HX CATARACT REMOVAL      HX ORTHOPAEDIC      bilateral knee replacments     Current Outpatient Prescriptions   Medication Sig Dispense Refill    atenolol (TENORMIN) 25 mg tablet Take 25 mg by mouth daily.  bimatoprost (LUMIGAN) 0.03 % ophthalmic drops Administer 1 drop to both eyes every evening.  aspirin (BABY ASPIRIN) 81 mg chewable tablet Take 81 mg by mouth daily.  telmisartan-hydrochlorothiazide (MICARDIS HCT) 80-25 mg per tablet Take 1 Tab by mouth daily. Indications: HYPERTENSION      potassium chloride SR (KLOR-CON 10) 10 mEq tablet Take 10 mEq by mouth two (2) times a day.  furosemide (LASIX) 20 mg tablet Take 40 mg by mouth daily. Indications: HYPERTENSION      niacin (NIASPAN) 1,000 mg Tb24 tab Take 1,000 mg by mouth nightly.  multivitamin (ONE A DAY) tablet Take 1 Tab by mouth daily.         calcium-cholecalciferol, d3, (CALCIUM 600 + D) 600-125 mg-unit Tab Take  by mouth. Allergies   Allergen Reactions    Ambien [Zolpidem] Other (comments)     Hallucinations     Social History     Social History    Marital status: UNKNOWN     Spouse name: N/A    Number of children: N/A    Years of education: N/A     Occupational History    Not on file. Social History Main Topics    Smoking status: Never Smoker    Smokeless tobacco: Never Used    Alcohol use No    Drug use: No    Sexual activity: Not on file     Other Topics Concern    Not on file     Social History Narrative      Family History   Problem Relation Age of Onset    Cancer Mother     Heart Disease Father     Cancer Sister     Hypertension Sister        Review of Systems    Review of Systems   Constitutional: Negative for chills, diaphoresis, fever, malaise/fatigue and weight loss. HENT: Negative for hearing loss and sore throat. Eyes: Negative for blurred vision, photophobia and redness. Respiratory: Negative for cough, hemoptysis, shortness of breath and wheezing. Cardiovascular: Positive for leg swelling. Negative for chest pain, palpitations and orthopnea. Gastrointestinal: Negative for abdominal pain, blood in stool, constipation, diarrhea, heartburn, nausea and vomiting. Genitourinary: Negative for dysuria, frequency, hematuria and urgency. Musculoskeletal: Negative for back pain and myalgias. Skin: Negative for itching and rash. Neurological: Negative for dizziness, speech change, focal weakness, weakness and headaches. Endo/Heme/Allergies: Does not bruise/bleed easily. Psychiatric/Behavioral: Negative for depression and suicidal ideas.             Physical Exam:    Visit Vitals    /70 (BP 1 Location: Right arm, BP Patient Position: Sitting)    Pulse 70    Resp 18    Ht 6' (1.829 m)    Wt 220 lb (99.8 kg)    BMI 29.84 kg/m2      Physical Examination: General appearance - alert, well appearing, and in no distress  Mental status - alert, oriented to person, place, and time  Eyes - sclera anicteric, left eye normal, right eye normal  Ears - right ear normal, left ear normal  Nose - normal and patent, no erythema, discharge or polyps  Mouth - mucous membranes moist, pharynx normal without lesions  Extremities - RLE with 1+ pitting edema, LLE with 2+ pitting edema. Open sore anterior left knee with serous drainage. Impression and Plan:    ICD-10-CM ICD-9-CM    1. Chronic venous insufficiency I87.2 459.81      I told Mr. Renee Bacon that I am concerned about his leg and that getting a revision of his knee replacement is of the utmost importance. We discussed the potential for hardware infection and osteomyelitis, and how if this worsened it could lead to an amputation. Mr. Renee Bacon states that he understands and will reconsider a revision at [facility]. In regards to his leg swelling, once his knee is dealt with I would recommend compression stockings. Mr. Renee Bacon will see the orthopedic specialist and will call us after this consultation to determine his follow up. Follow-up Disposition:  Return for Symptom check. The treatment plan was reviewed with the patient in detail. The patient voiced understanding of this plan and all questions and concerns were addressed. The patient agrees with this plan. We discussed the signs and symptoms that would require earlier attention or intervention. The patient was given educational material related to his/her visit and the patient has voiced understanding of the material.     I appreciate the opportunity to participate in the care of your patient. I will be sure to keep you informed of any subsequent changes in the treatment plan. If you have any questions or concerns, please feel free to contact me. Siva Birch MD    PLEASE NOTE:  This document has been produced using voice recognition software.  Unrecognized errors in transcription may be present.

## 2017-11-21 NOTE — MR AVS SNAPSHOT
Visit Information Date & Time Provider Department Dept. Phone Encounter #  
 11/21/2017  9:30 AM Florentino Martinez MD BS [facility] [address] [id number] Upcoming Health Maintenance Date Due DTaP/Tdap/Td series (1 - Tdap) 11/27/1960 ZOSTER VACCINE AGE 60> 9/27/1999 MEDICARE YEARLY EXAM 11/27/2004 Pneumococcal 65+ High/Highest Risk (2 of 2 - PPSV23) 1/2/2015 GLAUCOMA SCREENING Q2Y 1/13/2015 Influenza Age 5 to Adult 8/1/2017 Allergies as of 11/21/2017  Review Complete On: 11/21/2017 By: Bradley Phipps RN Severity Noted Reaction Type Reactions Ambien [Zolpidem]  01/13/2013    Other (comments) Hallucinations Current Immunizations  Reviewed on 11/21/2014 Name Date Influenza Vaccine 10/15/2014 Pneumococcal Vaccine (Unspecified Type) 1/2/2010 Not reviewed this visit Vitals BP Pulse Resp Height(growth percentile) Weight(growth percentile) BMI  
 132/70 (BP 1 Location: Right arm, BP Patient Position: Sitting) 70 18 6' (1.829 m) 220 lb (99.8 kg) 29.84 kg/m2 Smoking Status Never Smoker BMI and BSA Data Body Mass Index Body Surface Area  
 29.84 kg/m 2 2.25 m 2 Your Updated Medication List  
  
   
This list is accurate as of: 11/21/17 10:01 AM.  Always use your most recent med list.  
  
  
  
  
 atenolol 25 mg tablet Commonly known as:  TENORMIN Take 25 mg by mouth daily. BABY ASPIRIN 81 mg chewable tablet Generic drug:  aspirin Take 81 mg by mouth daily. CALCIUM 600 + D 600-125 mg-unit Tab Generic drug:  calcium-cholecalciferol (d3) Take  by mouth. LASIX 20 mg tablet Generic drug:  furosemide Take 40 mg by mouth daily. Indications: HYPERTENSION  
  
 LUMIGAN 0.03 % ophthalmic drops Generic drug:  bimatoprost  
Administer 1 drop to both eyes every evening. MICARDIS HCT 80-25 mg per tablet Generic drug:  telmisartan-hydroCHLOROthiazide Take 1 Tab by mouth daily. Indications: HYPERTENSION  
  
 multivitamin tablet Commonly known as:  ONE A DAY Take 1 Tab by mouth daily. niacin 1,000 mg Tb24 tab Commonly known as:  Rox Rist Take 1,000 mg by mouth nightly. potassium chloride SR 10 mEq tablet Commonly known as:  KLOR-CON 10 Take 10 mEq by mouth two (2) times a day. Introducing [facility] SERVICES! Dear Sky Blevins: Thank you for requesting a [employer] account. Our records indicate that you already have an active [employer]ook[employer] account. You can access your account anytime at [email or web address] Did you know that you can access your hospital and ER discharge instructions at any time in [employer]? You can also review all of your test results from your hospital stay or ER visit. Additional Information If you have questions, please visit the Frequently Asked Questions section of the [employer] website at [email or web address] Remember, [employer] is NOT to be used for urgent needs. For medical emergencies, dial 911. Now available from your iPhone and Android! Please provide this summary of care documentation to your next provider. Your primary care clinician is listed as Gemma Zavaleta. If you have any questions after today's visit, please call [phone].

## 2017-12-14 ENCOUNTER — HOSPITAL ENCOUNTER (EMERGENCY)
Age: 77
Discharge: HOME | End: 2017-12-14
Attending: EMERGENCY MEDICINE
Payer: MEDICARE

## 2017-12-14 VITALS
DIASTOLIC BLOOD PRESSURE: 83 MMHG | HEART RATE: 57 BPM | SYSTOLIC BLOOD PRESSURE: 192 MMHG | HEIGHT: 72 IN | BODY MASS INDEX: 29.8 KG/M2 | OXYGEN SATURATION: 100 % | TEMPERATURE: 97 F | WEIGHT: 220 LBS | RESPIRATION RATE: 18 BRPM

## 2017-12-14 DIAGNOSIS — R04.0 EPISTAXIS: Primary | ICD-10-CM

## 2017-12-14 PROCEDURE — 74011250637 HC RX REV CODE- 250/637: Performed by: PHYSICIAN ASSISTANT

## 2017-12-14 PROCEDURE — 75810000284 HC CNTRL NASAL HEMORHRAGE SIMPLE

## 2017-12-14 PROCEDURE — 99282 EMERGENCY DEPT VISIT SF MDM: CPT

## 2017-12-14 RX ORDER — AMOXICILLIN AND CLAVULANATE POTASSIUM 875; 125 MG/1; MG/1
1 TABLET, FILM COATED ORAL 2 TIMES DAILY
Qty: 14 TAB | Refills: 0 | Status: SHIPPED | OUTPATIENT
Start: 2017-12-14 | End: 2017-12-21

## 2017-12-14 RX ADMIN — PHENYLEPHRINE HYDROCHLORIDE 1 SPRAY: 0.5 SPRAY NASAL at 15:00

## 2017-12-14 NOTE — ED TRIAGE NOTES
Pt reports nosebleed intermittently x 13 hrs. Pt is not on any blood thinning medication. Pt denies dizziness.

## 2017-12-14 NOTE — ED PROVIDER NOTES
EMERGENCY DEPARTMENT HISTORY AND PHYSICAL EXAM    Date: 12/14/2017  Patient Name: Daniele Fabian    History of Presenting Illness     Chief Complaint   Patient presents with    Epistaxis         History Provided By: Patient    Chief Complaint: epistaxis   Duration: 4 hours   Timing:  Intermittent  Location: nose  Quality: bleeding  Severity: Mild  Associated Symptoms: denies any other associated signs or symptoms    Additional History (Context):   2:06 PM   Daniele Fabian is a 65 y.o. male who presents to the emergency department C/O intermittent mild epistaxis from the right nostril onset 4 hours ago. The patient states that he woke up this morning with nasal congestion, and put saline in his nose; after which onset the bleeding. Pt denies blood thinner use, recent nose bleeds, fever, chills, and any other sxs or complaints. PCP: Abiel Gallegos MD    Current Outpatient Prescriptions   Medication Sig Dispense Refill    amoxicillin-clavulanate (AUGMENTIN) 875-125 mg per tablet Take 1 Tab by mouth two (2) times a day for 7 days. 14 Tab 0    atenolol (TENORMIN) 25 mg tablet Take 25 mg by mouth daily.  bimatoprost (LUMIGAN) 0.03 % ophthalmic drops Administer 1 drop to both eyes every evening.  aspirin (BABY ASPIRIN) 81 mg chewable tablet Take 81 mg by mouth daily.  telmisartan-hydrochlorothiazide (MICARDIS HCT) 80-25 mg per tablet Take 1 Tab by mouth daily. Indications: HYPERTENSION      potassium chloride SR (KLOR-CON 10) 10 mEq tablet Take 10 mEq by mouth two (2) times a day.  furosemide (LASIX) 20 mg tablet Take 40 mg by mouth daily. Indications: HYPERTENSION      niacin (NIASPAN) 1,000 mg Tb24 tab Take 1,000 mg by mouth nightly.  multivitamin (ONE A DAY) tablet Take 1 Tab by mouth daily.  calcium-cholecalciferol, d3, (CALCIUM 600 + D) 600-125 mg-unit Tab Take  by mouth.            Past History     Past Medical History:  Past Medical History:   Diagnosis Date  ARF (acute renal failure) ([facility] [address] 6/3/2013    Cellulitis     Hypertension     Unspecified sleep apnea        Past Surgical History:  Past Surgical History:   Procedure Laterality Date    HX CATARACT REMOVAL      HX ORTHOPAEDIC      bilateral knee replacments       Family History:  Family History   Problem Relation Age of Onset    Cancer Mother     Heart Disease Father     Cancer Sister     Hypertension Sister        Social History:  Social History   Substance Use Topics    Smoking status: Never Smoker    Smokeless tobacco: Never Used    Alcohol use No       Allergies: Allergies   Allergen Reactions    Ambien [Zolpidem] Other (comments)     Hallucinations         Review of Systems   Review of Systems   Constitutional: Negative for chills and fever. HENT: Positive for congestion and nosebleeds (right nostril ). All other systems reviewed and are negative. Physical Exam     Vitals:    12/14/17 1354 12/14/17 1632   BP: 178/59 192/83   Pulse: 65 (!) 57   Resp: 20 18   Temp: 97 °F (36.1 °C)    SpO2: 97% 100%   Weight: 99.8 kg (220 lb)    Height: 6' (1.829 m)      Physical Exam   Nursing note and vitals reviewed. Vital signs and nursing notes reviewed. CONSTITUTIONAL: Alert. Well-appearing; well-nourished; in no apparent distress. HEAD: Normocephalic; atraumatic. EYES: PERRL; Conjunctiva clear. ENT: TM's normal. External ear normal. Normal external nose; slow bleeding from right anterior nasal septum; no blood in posterior oropharynx or posterior nare; no blood in left nare. Moist mucus membranes. SKIN: Normal for age and race; warm; dry; good turgor; no apparent lesions or exudate. NEURO: A & O x3. PSYCH:  Mood and affect appropriate. Diagnostic Study Results     Labs -   No results found for this or any previous visit (from the past 12 hour(s)).     Radiologic Studies -   No orders to display     CT Results  (Last 48 hours)    None        CXR Results  (Last 48 hours)    None Medications given in the ED-  Medications   phenylephrine (NEOSYNEPHRINE) 0.5 % nasal spray 1 Spray (1 Spray Right Nostril Given by Provider 12/14/17 1500)         Medical Decision Making   I am the first provider for this patient. I reviewed the vital signs, available nursing notes, past medical history, past surgical history, family history and social history. Vital Signs-Reviewed the patient's vital signs. Pulse Oximetry Analysis - 97%% on room air     Cardiac Monitor:  Rate: 65 bpm  Rhythm: room air      Records Reviewed: Nursing Notes and Old Medical Records  Procedures:  Epistaxis Management  Date/Time: 12/14/2017 3:34 PM  Performed by: Marcella Alvarado by: Petty Berkowitz     Consent:     Consent obtained:  Verbal    Consent given by:  Patient    Risks discussed:  Bleeding and infection    Alternatives discussed:  No treatment  Anesthesia (see MAR for exact dosages): Anesthesia method:  None  Procedure details:     Treatment site:  R anterior    Treatment method:  Anterior pack    Treatment complexity:  Limited    Treatment episode: initial    Post-procedure details:     Assessment:  Bleeding stopped    Patient tolerance of procedure: Tolerated well, no immediate complications        ED Course:   2:06 PM    Initial assessment performed. The patients presenting problems have been discussed, and they are in agreement with the care plan formulated and outlined with them. I have encouraged them to ask questions as they arise throughout their visit. 2:57 PM - Neosynephrine applied to the right nostril     PROGRESS NOTE:   3:33 PM  Pt has been re-examined by Andrey Spring PA-C. Neosynephrine applied once, pressure appled. Bleeding seemed to stop, but immediately started again with a slow ooze, from the right anterior septum. Informed patient, will perform nasal packing.         Diagnosis and Disposition       DISCHARGE NOTE:  3:57 PM   Cory Tanner  results have been reviewed with him. He has been counseled regarding his diagnosis, treatment, and plan. He verbally conveys understanding and agreement of the signs, symptoms, diagnosis, treatment and prognosis and additionally agrees to follow up as discussed. He also agrees with the care-plan and conveys that all of his questions have been answered. I have also provided discharge instructions for him that include: educational information regarding their diagnosis and treatment, and list of reasons why they would want to return to the ED prior to their follow-up appointment, should his condition change. He has been provided with education for proper emergency department utilization. CLINICAL IMPRESSION:    1. Epistaxis        PLAN:  1. D/C Home  2. Discharge Medication List as of 12/14/2017  3:55 PM      START taking these medications    Details   amoxicillin-clavulanate (AUGMENTIN) 875-125 mg per tablet Take 1 Tab by mouth two (2) times a day for 7 days. , Print, Disp-14 Tab, R-0         CONTINUE these medications which have NOT CHANGED    Details   atenolol (TENORMIN) 25 mg tablet Take 25 mg by mouth daily. , Historical Med      bimatoprost (LUMIGAN) 0.03 % ophthalmic drops Administer 1 drop to both eyes every evening., Historical Med      aspirin (BABY ASPIRIN) 81 mg chewable tablet Take 81 mg by mouth daily. Historical Med, 81 mg      telmisartan-hydrochlorothiazide (MICARDIS HCT) 80-25 mg per tablet Take 1 Tab by mouth daily. Indications: HYPERTENSIONHistorical Med, 1 Tab      potassium chloride SR (KLOR-CON 10) 10 mEq tablet Take 10 mEq by mouth two (2) times a day. Historical Med, 10 mEq      furosemide (LASIX) 20 mg tablet Take 40 mg by mouth daily. Indications: HYPERTENSIONHistorical Med, 40 mg      niacin (NIASPAN) 1,000 mg Tb24 tab Take 1,000 mg by mouth nightly. Historical Med, 1,000 mg      multivitamin (ONE A DAY) tablet Take 1 Tab by mouth daily.   Historical Med, 1 Tab calcium-cholecalciferol, d3, (CALCIUM 600 + D) 600-125 mg-unit Tab Take  by mouth. Historical Med           3. Follow-up Information     Follow up With Details Comments Contact Info    Denisse Heredia MD Schedule an appointment as soon as possible for a visit in 2 days Follow-up for packing removal in 2 days Yolanda Flores [phone] [facility]      [facility] Go in 2 days Packing removal in 2 days [address] [place] [address]    THE [facility] EMERGENCY DEPT Go to As needed, If symptoms worsen [address] [id number]  [phone]        _______________________________    Attestations: This note is prepared by Rea Bence, acting as Scribe for Escobar Lawler PA-C . Escobar Lawler PA-C:  The scribe's documentation has been prepared under my direction and personally reviewed by me in its entirety.   I confirm that the note above accurately reflects all work, treatment, procedures, and medical decision making performed by me.  _______________________________

## 2017-12-14 NOTE — DISCHARGE INSTRUCTIONS
Nasal Packing: Care Instructions  Your Care Instructions  After a nose injury or surgery, gauze is packed high up into the nose. It soaks up fluids that drain from the nose, such as blood. The doctor may change the gauze. Or he or she may leave it in place for a few days. Your face may look puffy. The skin near your eyes may be bruised. This may last for many days, but it will fade over time. Follow-up care is a key part of your treatment and safety. Be sure to make and go to all appointments, and call your doctor if you are having problems. It's also a good idea to know your test results and keep a list of the medicines you take. How can you care for yourself at home? ? · Follow your doctor's advice for taking care of the packing. Your doctor may want to take it out at his or her office. Activity  ? · Avoid strenuous activities for 1 week or until your doctor says it is okay. These include bicycle riding, jogging, weight lifting, and aerobic exercise. ? · You may drive when you are no longer taking prescription pain pills and feel up to it. Medicines  ? · Do not take aspirin, medicines that contain aspirin, or anti-inflammatory medicines such as ibuprofen (Advil, Motrin) or naproxen (Aleve) for 3 weeks after surgery unless your doctor says it is okay. ? · Take pain medicines exactly as directed. ¨ If the doctor gave you a prescription medicine for pain, take it as prescribed. ? · If your doctor prescribed antibiotics, take them as directed. Do not stop taking them just because you feel better. You need to take the full course of antibiotics. ? · If you think your pain medicine is making you sick to your stomach:  ¨ Take your medicine after meals (unless your doctor has told you not to). ¨ Ask your doctor for a different pain medicine. Other instructions  ? · Do not blow your nose for 1 week after surgery. ? · Do not put anything into your nose.    ? · If you must sneeze, open your mouth and sneeze naturally. ? · After the packing is removed, use saline (saltwater) nasal washes to help keep your nasal passages open. This will wash out mucus and bacteria. You can buy saline nose drops at a grocery store or drugstore. Or you can make your own at home. Add 1 teaspoon of salt and 1 teaspoon of baking soda to 2 cups of distilled water. If you make your own, fill a bulb syringe with the solution. Put the tip into your nostril, and squeeze gently. [place] your nose. When should you call for help? Call 911 anytime you think you may need emergency care. For example, call if:  ? · You passed out (lost consciousness). ? · You have trouble breathing. ? · You have sudden chest pain and shortness of breath, or you cough up blood. ?Call your doctor now or seek immediate medical care if:  ? · You have symptoms of infection, such as:  ¨ Increased pain, swelling, warmth, or redness. ¨ Red streaks leading from the area. ¨ Pus draining from the area. ¨ A fever. ? · You seem to be getting sicker. ? · You have new pain or the pain gets worse. ? · You have bleeding through the nasal packing that is not slowing. ? Watch closely for changes in your health, and be sure to contact your doctor if:  ? · You do not get better as expected. Where can you learn more? Go to http://ronny-rajwinder.info/. Enter E043 in the search box to learn more about \"Nasal Packing: Care Instructions. \"  Current as of: May 12, 2017  Content Version: 11.4  © 2251-9027 [employer]. Care instructions adapted under license by [employer] (which disclaims liability or warranty for this information). If you have questions about a medical condition or this instruction, always ask your healthcare professional. [address] any warranty or liability for your use of this information.        Nosebleeds: Care Instructions  Your Care Instructions    Nosebleeds are common, especially if you have colds or allergies. Many things can cause a nosebleed. Some nosebleeds stop on their own with pressure. Others need packing. Some get cauterized (sealed). If you have gauze or other packing materials in your nose, you will need to follow up with your doctor to have the packing removed. You may need more treatment if you get nosebleeds a lot. The doctor has checked you carefully, but problems can develop later. If you notice any problems or new symptoms, get medical treatment right away. Follow-up care is a key part of your treatment and safety. Be sure to make and go to all appointments, and call your doctor if you are having problems. It's also a good idea to know your test results and keep a list of the medicines you take. How can you care for yourself at home? · If you get another nosebleed:  ¨ Sit up and tilt your head slightly forward. This keeps blood from going down your throat. ¨ Use your thumb and index finger to pinch your nose shut for 10 minutes. Use a clock. Do not check to see if the bleeding has stopped before the 10 minutes are up. If the bleeding has not stopped, pinch your nose shut for another 10 minutes. ¨ When the bleeding has stopped, try not to pick, rub, or blow your nose for 12 hours. Avoiding these things helps keep your nose from bleeding again. · If your doctor prescribed antibiotics, take them as directed. Do not stop taking them just because you feel better. You need to take the full course of antibiotics. To prevent nosebleeds  · Do not blow your nose too hard. · Try not to lift or strain after a nosebleed. · Raise your head on a pillow while you sleep. · Put a thin layer of a saline- or water-based nasal gel, such as NasoGel, inside your nose. Put it on the septum, which divides your nostrils. This will prevent dryness that can cause nosebleeds. · Use a vaporizer or humidifier to add moisture to your bedroom.  Follow the directions for cleaning the machine. · Do not use aspirin, ibuprofen (Advil, Motrin), or naproxen (Aleve) for 36 to 48 hours after a nosebleed unless your doctor tells you to. You can use acetaminophen (Tylenol) for pain relief. · Talk to your doctor about stopping any other medicines you are taking. Some medicines may make you more likely to get a nosebleed. · Do not use cold medicines or nasal sprays without first talking to your doctor. They can make your nose dry. When should you call for help? Call 911 anytime you think you may need emergency care. For example, call if:  ? · You passed out (lost consciousness). ?Call your doctor now or seek immediate medical care if:  ? · You get another nosebleed and your nose is still bleeding after you have applied pressure 3 times for 10 minutes each time (30 minutes total). ? · There is a lot of blood running down the back of your throat even after you pinch your nose and tilt your head forward. ? · You have a fever. ? · You have sinus pain. ? Watch closely for changes in your health, and be sure to contact your doctor if:  ? · You get nosebleeds often, even if they stop. ? · You do not get better as expected. Where can you learn more? Go to http://ronny-rajwinder.info/. Enter S156 in the search box to learn more about \"Nosebleeds: Care Instructions. \"  Current as of: March 20, 2017  Content Version: 11.4  © 5917-2073 [employer]. Care instructions adapted under license by [employer] (which disclaims liability or warranty for this information). If you have questions about a medical condition or this instruction, always ask your healthcare professional. [place] [address] any warranty or liability for your use of this information.

## 2018-08-13 ENCOUNTER — TELEPHONE (OUTPATIENT)
Dept: INTERVENTIONAL RADIOLOGY/VASCULAR | Age: 78
End: 2018-08-13

## 2018-08-13 RX ORDER — CEFAZOLIN SODIUM/WATER 2 G/20 ML
2 SYRINGE (ML) INTRAVENOUS ONCE
Status: COMPLETED | OUTPATIENT
Start: 2018-08-14 | End: 2018-08-14

## 2018-08-14 ENCOUNTER — HOSPITAL ENCOUNTER (OUTPATIENT)
Dept: INTERVENTIONAL RADIOLOGY/VASCULAR | Age: 78
Discharge: HOME | End: 2018-08-14
Attending: RADIOLOGY | Admitting: RADIOLOGY
Payer: MEDICARE

## 2018-08-14 ENCOUNTER — HOSPITAL ENCOUNTER (OUTPATIENT)
Dept: INFUSION THERAPY | Age: 78
Discharge: HOME | End: 2018-08-14
Payer: MEDICARE

## 2018-08-14 VITALS
BODY MASS INDEX: 28.58 KG/M2 | TEMPERATURE: 97.5 F | DIASTOLIC BLOOD PRESSURE: 75 MMHG | HEART RATE: 82 BPM | HEIGHT: 72 IN | WEIGHT: 211 LBS | RESPIRATION RATE: 18 BRPM | SYSTOLIC BLOOD PRESSURE: 127 MMHG | OXYGEN SATURATION: 98 %

## 2018-08-14 DIAGNOSIS — M00.9 JOINT INFECTION (HCC): ICD-10-CM

## 2018-08-14 LAB
APTT PPP: 31.8 SEC (ref 23–36.4)
ERYTHROCYTE [SEDIMENTATION RATE] IN BLOOD: 116 MM/HR (ref 0–20)
INR PPP: 1.2 (ref 0.8–1.2)
PROTHROMBIN TIME: 14.6 SEC (ref 11.5–15.2)

## 2018-08-14 PROCEDURE — 76937 US GUIDE VASCULAR ACCESS: CPT

## 2018-08-14 PROCEDURE — 36415 COLL VENOUS BLD VENIPUNCTURE: CPT | Performed by: INTERNAL MEDICINE

## 2018-08-14 PROCEDURE — 85730 THROMBOPLASTIN TIME PARTIAL: CPT | Performed by: INTERNAL MEDICINE

## 2018-08-14 PROCEDURE — 96374 THER/PROPH/DIAG INJ IV PUSH: CPT

## 2018-08-14 PROCEDURE — 74011250636 HC RX REV CODE- 250/636

## 2018-08-14 PROCEDURE — 85652 RBC SED RATE AUTOMATED: CPT

## 2018-08-14 PROCEDURE — C1894 INTRO/SHEATH, NON-LASER: HCPCS

## 2018-08-14 PROCEDURE — 85610 PROTHROMBIN TIME: CPT | Performed by: INTERNAL MEDICINE

## 2018-08-14 PROCEDURE — 74011250636 HC RX REV CODE- 250/636: Performed by: RADIOLOGY

## 2018-08-14 RX ORDER — HEPARIN SODIUM (PORCINE) LOCK FLUSH IV SOLN 100 UNIT/ML 100 UNIT/ML
500 SOLUTION INTRAVENOUS
Status: COMPLETED | OUTPATIENT
Start: 2018-08-14 | End: 2018-08-14

## 2018-08-14 RX ORDER — SODIUM CHLORIDE 0.9 % (FLUSH) 0.9 %
10-40 SYRINGE (ML) INJECTION AS NEEDED
Status: DISCONTINUED | OUTPATIENT
Start: 2018-08-14 | End: 2018-08-17 | Stop reason: HOSPADM

## 2018-08-14 RX ORDER — HEPARIN SODIUM 200 [USP'U]/100ML
500 INJECTION, SOLUTION INTRAVENOUS
Status: COMPLETED | OUTPATIENT
Start: 2018-08-14 | End: 2018-08-14

## 2018-08-14 RX ORDER — LIDOCAINE HYDROCHLORIDE 10 MG/ML
1-20 INJECTION INFILTRATION; PERINEURAL
Status: COMPLETED | OUTPATIENT
Start: 2018-08-14 | End: 2018-08-14

## 2018-08-14 RX ORDER — HEPARIN 100 UNIT/ML
500 SYRINGE INTRAVENOUS ONCE
Status: COMPLETED | OUTPATIENT
Start: 2018-08-14 | End: 2018-08-14

## 2018-08-14 RX ADMIN — Medication 10 ML: at 13:53

## 2018-08-14 RX ADMIN — Medication 10 ML: at 13:58

## 2018-08-14 RX ADMIN — Medication 20 ML: at 13:50

## 2018-08-14 RX ADMIN — HEPARIN 500 UNITS: 100 SYRINGE at 13:58

## 2018-08-14 RX ADMIN — HEPARIN SODIUM 1000 UNITS: 200 INJECTION, SOLUTION INTRAVENOUS at 12:30

## 2018-08-14 RX ADMIN — Medication 2 G: at 13:53

## 2018-08-14 RX ADMIN — HEPARIN SODIUM (PORCINE) LOCK FLUSH IV SOLN 100 UNIT/ML 500 UNITS: 100 SOLUTION at 12:32

## 2018-08-14 RX ADMIN — LIDOCAINE HYDROCHLORIDE 10 ML: 10 INJECTION, SOLUTION INFILTRATION; PERINEURAL at 12:30

## 2018-08-14 NOTE — PROGRESS NOTES
[facility] Progress Note    Date: 2018    Name: Tarah Slade    MRN: [id number]         : 1940    Cefazolin 1st Dose    Mr. Shraif Coronel to [facility] from [facility] via wheelchair at 1325. Pt was assessed and education was provided. Patient was able to transfer to [facility] chair independently using his cane. Left knee and lower leg are swollen and patient reports a wound to the upper shin below his left knee that is draining clear fluid. He comes to [facility] for his first dose of Cefazolin and will receive further doses through home health. Patient had right upper chest PICC placed this morning. Reviewed potential adverse effects of Cefazolin, including signs that require immediate medical attention. Patient given printed CareNotes. Mr. Shabnam De Leon vitals were reviewed. Visit Vitals    /75 (BP 1 Location: Left arm, BP Patient Position: Sitting)    Pulse 82    Temp 97.5 °F (36.4 °C)    Resp 18    Ht 6' (1.829 m)    Wt 95.7 kg (211 lb)    SpO2 98%    BMI 28.62 kg/m2       Right upper chest PICC flushed easily and had brisk blood return via both ports. Blood drawn off and sent to lab for ESR after 10 ml waste per written orders. Recent Results (from the past 12 hour(s))   PROTHROMBIN TIME + INR    Collection Time: 18 11:15 AM   Result Value Ref Range    Prothrombin time 14.6 11.5 - 15.2 sec    INR 1.2 0.8 - 1.2     PTT    Collection Time: 18 11:15 AM   Result Value Ref Range    aPTT 31.8 23.0 - 36.4 SEC       PICC dressing c/d/i and not due to be changed. No swelling, redness, streaking, warmth or drainage noted in arm. Pt denied c/o pain in arm. [x]  Cefazolin 2 gm     []  Invanz     []  Cubicin     []  Rocephin    was administered slow IV push over approximately 5 minutes. Mr. Sharif Coronel tolerated infusion, and had no complaints at this time. Both lumens of PICC flushed with NS 10 ml and Heparin 250 units and green end caps applied.      Patient remained in [facility] for 30 minutes of observation following the dose. At the end of observation period there were no signs/symptoms of allergic reaction such as rash, lip/tongue swelling, nausea, etc.     Patient armband removed and shredded. Mr. Izabela Grullon was discharged from [place] [address] in stable condition at 1430. He is to follow up with Dr. Yamini Pittman as instructed.      Ashley Martinez RN  August 14, 2018

## 2018-08-30 ENCOUNTER — HOSPITAL ENCOUNTER (OUTPATIENT)
Dept: INFUSION THERAPY | Age: 78
Discharge: HOME | End: 2018-08-30
Payer: MEDICARE

## 2018-08-30 VITALS
RESPIRATION RATE: 18 BRPM | SYSTOLIC BLOOD PRESSURE: 144 MMHG | DIASTOLIC BLOOD PRESSURE: 87 MMHG | HEART RATE: 81 BPM | TEMPERATURE: 98.4 F | OXYGEN SATURATION: 97 %

## 2018-08-30 PROCEDURE — 96523 IRRIG DRUG DELIVERY DEVICE: CPT

## 2018-08-30 RX ORDER — SODIUM CHLORIDE 0.9 % (FLUSH) 0.9 %
10-40 SYRINGE (ML) INJECTION AS NEEDED
Status: DISCONTINUED | OUTPATIENT
Start: 2018-08-30 | End: 2018-09-03 | Stop reason: HOSPADM

## 2018-08-30 RX ADMIN — Medication 30 ML: at 13:16

## 2018-08-30 NOTE — PROGRESS NOTES
[facility] Progress Note Date: 2018 Name: Marcella Larkin MRN: [id number] : 1940 Mr. Shelby arrived to [facility] at 1300. Mr. Cesar Mendez was assessed and education was provided. Mr Cesar Mendez reports that his home health nurse was unable to get blood return from either lumen on her last visit. He is here for activase. Mr. Petr Herrera vitals were reviewed. Visit Vitals  /87 (BP 1 Location: Left arm, BP Patient Position: Sitting)  Pulse 81  Temp 98.4 °F (36.9 °C)  Resp 18  SpO2 97% Pt with  Upper chest double lumen PICC line. Each lumen flushes without difficulty and positive for blood return. Dressing CDI. No redness, bruising, or swelling noted at site and/ or extremity. Pt denies tenderness. Each lumen flushed with 10 ml NS. Activase not necessary and was not administered. Patient reports that he self administers heparin into each lumen. Mr. Cesar Mendez tolerated well without complaints. Mr. Cesar Mendez was discharged from [place] [address] in stable condition at 1315. Loreto Bryan RN 2018

## 2018-10-11 RX ORDER — CEPHALEXIN 500 MG/1
500 CAPSULE ORAL 2 TIMES DAILY
COMMUNITY
End: 2021-01-01

## 2018-10-16 ENCOUNTER — HOSPITAL ENCOUNTER (OUTPATIENT)
Dept: INTERVENTIONAL RADIOLOGY/VASCULAR | Age: 78
Discharge: HOME | End: 2018-10-16
Attending: INTERNAL MEDICINE
Payer: MEDICARE

## 2018-10-16 DIAGNOSIS — T84.50XA INFECTION OF PROSTHETIC JOINT, INITIAL ENCOUNTER (HCC): ICD-10-CM

## 2018-10-16 PROCEDURE — 36590 REMOVAL TUNNELED CV CATH: CPT

## 2019-05-28 ENCOUNTER — HOSPITAL ENCOUNTER (OUTPATIENT)
Dept: LAB | Age: 79
Discharge: HOME | End: 2019-05-28
Payer: MEDICARE

## 2019-05-28 DIAGNOSIS — I48.91 ATRIAL FIBRILLATION (HCC): ICD-10-CM

## 2019-05-28 LAB
ALBUMIN SERPL-MCNC: 3.1 G/DL (ref 3.4–5)
ALBUMIN/GLOB SERPL: 0.8 {RATIO} (ref 0.8–1.7)
ALP SERPL-CCNC: 74 U/L (ref 45–117)
ALT SERPL-CCNC: 16 U/L (ref 16–61)
ANION GAP SERPL CALC-SCNC: 10 MMOL/L (ref 3–18)
AST SERPL-CCNC: 23 U/L (ref 15–37)
BILIRUB SERPL-MCNC: 0.4 MG/DL (ref 0.2–1)
BUN SERPL-MCNC: 43 MG/DL (ref 7–18)
BUN/CREAT SERPL: 26 (ref 12–20)
CALCIUM SERPL-MCNC: 8.4 MG/DL (ref 8.5–10.1)
CHLORIDE SERPL-SCNC: 101 MMOL/L (ref 100–108)
CO2 SERPL-SCNC: 29 MMOL/L (ref 21–32)
CREAT SERPL-MCNC: 1.68 MG/DL (ref 0.6–1.3)
ERYTHROCYTE [DISTWIDTH] IN BLOOD BY AUTOMATED COUNT: 14.2 % (ref 11.6–14.5)
GLOBULIN SER CALC-MCNC: 3.7 G/DL (ref 2–4)
GLUCOSE SERPL-MCNC: 185 MG/DL (ref 74–99)
HCT VFR BLD AUTO: 30.1 % (ref 36–48)
HGB BLD-MCNC: 9.8 G/DL (ref 13–16)
MAGNESIUM SERPL-MCNC: 2.4 MG/DL (ref 1.6–2.6)
MCH RBC QN AUTO: 29.4 PG (ref 24–34)
MCHC RBC AUTO-ENTMCNC: 32.6 G/DL (ref 31–37)
MCV RBC AUTO: 90.4 FL (ref 74–97)
PLATELET # BLD AUTO: 223 K/UL (ref 135–420)
PMV BLD AUTO: 8 FL (ref 9.2–11.8)
POTASSIUM SERPL-SCNC: 4.3 MMOL/L (ref 3.5–5.5)
PROT SERPL-MCNC: 6.8 G/DL (ref 6.4–8.2)
RBC # BLD AUTO: 3.33 M/UL (ref 4.7–5.5)
SODIUM SERPL-SCNC: 140 MMOL/L (ref 136–145)
WBC # BLD AUTO: 7.9 K/UL (ref 4.6–13.2)

## 2019-05-28 PROCEDURE — 85027 COMPLETE CBC AUTOMATED: CPT

## 2019-05-28 PROCEDURE — 36415 COLL VENOUS BLD VENIPUNCTURE: CPT

## 2019-05-28 PROCEDURE — 83735 ASSAY OF MAGNESIUM: CPT

## 2019-05-28 PROCEDURE — 80053 COMPREHEN METABOLIC PANEL: CPT

## 2019-05-29 LAB
FAX TO INFO,FAXT: NORMAL
FAX TO NUMBER,FAXN: NORMAL

## 2019-07-09 ENCOUNTER — HOSPITAL ENCOUNTER (OUTPATIENT)
Dept: LAB | Age: 79
Discharge: HOME | End: 2019-07-09
Payer: MEDICARE

## 2019-07-09 LAB
BASOPHILS # BLD: 0 K/UL (ref 0–0.1)
BASOPHILS NFR BLD: 0 % (ref 0–2)
DIFFERENTIAL METHOD BLD: ABNORMAL
EOSINOPHIL # BLD: 0.3 K/UL (ref 0–0.4)
EOSINOPHIL NFR BLD: 3 % (ref 0–5)
ERYTHROCYTE [DISTWIDTH] IN BLOOD BY AUTOMATED COUNT: 14.4 % (ref 11.6–14.5)
HCT VFR BLD AUTO: 26.6 % (ref 36–48)
HGB BLD-MCNC: 8.6 G/DL (ref 13–16)
LYMPHOCYTES # BLD: 1.6 K/UL (ref 0.9–3.6)
LYMPHOCYTES NFR BLD: 16 % (ref 21–52)
MCH RBC QN AUTO: 28.8 PG (ref 24–34)
MCHC RBC AUTO-ENTMCNC: 32.3 G/DL (ref 31–37)
MCV RBC AUTO: 89 FL (ref 74–97)
MONOCYTES # BLD: 1.3 K/UL (ref 0.05–1.2)
MONOCYTES NFR BLD: 13 % (ref 3–10)
NEUTS SEG # BLD: 6.9 K/UL (ref 1.8–8)
NEUTS SEG NFR BLD: 68 % (ref 40–73)
PLATELET # BLD AUTO: 358 K/UL (ref 135–420)
PMV BLD AUTO: 8.5 FL (ref 9.2–11.8)
RBC # BLD AUTO: 2.99 M/UL (ref 4.7–5.5)
WBC # BLD AUTO: 10.1 K/UL (ref 4.6–13.2)

## 2019-07-09 PROCEDURE — 36415 COLL VENOUS BLD VENIPUNCTURE: CPT

## 2019-07-09 PROCEDURE — 85025 COMPLETE CBC W/AUTO DIFF WBC: CPT

## 2019-07-10 LAB
FAX TO INFO,FAXT: NORMAL
FAX TO NUMBER,FAXN: NORMAL

## 2019-07-24 ENCOUNTER — HOSPITAL ENCOUNTER (OUTPATIENT)
Dept: LAB | Age: 79
Discharge: HOME | End: 2019-07-24
Payer: MEDICARE

## 2019-07-24 LAB
BASOPHILS # BLD: 0 K/UL (ref 0–0.1)
BASOPHILS NFR BLD: 0 % (ref 0–2)
DIFFERENTIAL METHOD BLD: ABNORMAL
EOSINOPHIL # BLD: 0.2 K/UL (ref 0–0.4)
EOSINOPHIL NFR BLD: 3 % (ref 0–5)
ERYTHROCYTE [DISTWIDTH] IN BLOOD BY AUTOMATED COUNT: 14.2 % (ref 11.6–14.5)
HCT VFR BLD AUTO: 25.8 % (ref 36–48)
HGB BLD-MCNC: 8.4 G/DL (ref 13–16)
LYMPHOCYTES # BLD: 1.4 K/UL (ref 0.9–3.6)
LYMPHOCYTES NFR BLD: 21 % (ref 21–52)
MCH RBC QN AUTO: 28.6 PG (ref 24–34)
MCHC RBC AUTO-ENTMCNC: 32.6 G/DL (ref 31–37)
MCV RBC AUTO: 87.8 FL (ref 74–97)
MONOCYTES # BLD: 1 K/UL (ref 0.05–1.2)
MONOCYTES NFR BLD: 14 % (ref 3–10)
NEUTS SEG # BLD: 4.3 K/UL (ref 1.8–8)
NEUTS SEG NFR BLD: 62 % (ref 40–73)
PLATELET # BLD AUTO: 291 K/UL (ref 135–420)
PMV BLD AUTO: 7.9 FL (ref 9.2–11.8)
RBC # BLD AUTO: 2.94 M/UL (ref 4.7–5.5)
WBC # BLD AUTO: 7 K/UL (ref 4.6–13.2)

## 2019-07-24 PROCEDURE — 85025 COMPLETE CBC W/AUTO DIFF WBC: CPT

## 2019-07-24 PROCEDURE — 36415 COLL VENOUS BLD VENIPUNCTURE: CPT

## 2019-08-09 ENCOUNTER — HOSPITAL ENCOUNTER (OUTPATIENT)
Dept: LAB | Age: 79
Discharge: HOME | End: 2019-08-09
Payer: MEDICARE

## 2019-08-09 ENCOUNTER — HOSPITAL ENCOUNTER (OUTPATIENT)
Dept: NON INVASIVE DIAGNOSTICS | Age: 79
Discharge: HOME | End: 2019-08-09
Payer: MEDICARE

## 2019-08-09 DIAGNOSIS — M86.271 SUBACUTE OSTEOMYELITIS, RIGHT ANKLE AND FOOT (HCC): ICD-10-CM

## 2019-08-09 LAB
HCT VFR BLD AUTO: 26.8 % (ref 36–48)
HGB BLD-MCNC: 8.5 G/DL (ref 13–16)
POTASSIUM SERPL-SCNC: 3.8 MMOL/L (ref 3.5–5.5)

## 2019-08-09 PROCEDURE — 93005 ELECTROCARDIOGRAM TRACING: CPT

## 2019-08-09 PROCEDURE — 84132 ASSAY OF SERUM POTASSIUM: CPT

## 2019-08-09 PROCEDURE — 85018 HEMOGLOBIN: CPT

## 2019-08-09 PROCEDURE — 36415 COLL VENOUS BLD VENIPUNCTURE: CPT

## 2019-08-10 LAB
ATRIAL RATE: 326 BPM
CALCULATED R AXIS, ECG10: -21 DEGREES
CALCULATED T AXIS, ECG11: 45 DEGREES
DIAGNOSIS, 93000: NORMAL
Q-T INTERVAL, ECG07: 412 MS
QRS DURATION, ECG06: 106 MS
QTC CALCULATION (BEZET), ECG08: 463 MS
VENTRICULAR RATE, ECG03: 76 BPM

## 2019-08-13 ENCOUNTER — ANESTHESIA EVENT (OUTPATIENT)
Dept: SURGERY | Age: 79
End: 2019-08-13
Payer: MEDICARE

## 2019-08-13 ENCOUNTER — ANESTHESIA (OUTPATIENT)
Dept: SURGERY | Age: 79
End: 2019-08-13
Payer: MEDICARE

## 2019-08-13 ENCOUNTER — HOSPITAL ENCOUNTER (OUTPATIENT)
Age: 79
Setting detail: OUTPATIENT SURGERY
Discharge: HOME | End: 2019-08-13
Attending: PODIATRIST | Admitting: PODIATRIST
Payer: MEDICARE

## 2019-08-13 VITALS
HEART RATE: 86 BPM | SYSTOLIC BLOOD PRESSURE: 142 MMHG | HEIGHT: 72 IN | TEMPERATURE: 98.2 F | WEIGHT: 223.38 LBS | RESPIRATION RATE: 18 BRPM | DIASTOLIC BLOOD PRESSURE: 81 MMHG | OXYGEN SATURATION: 98 % | BODY MASS INDEX: 30.25 KG/M2

## 2019-08-13 LAB
GLUCOSE BLD STRIP.AUTO-MCNC: 106 MG/DL (ref 70–110)
GLUCOSE BLD STRIP.AUTO-MCNC: 126 MG/DL (ref 70–110)

## 2019-08-13 PROCEDURE — 74011250637 HC RX REV CODE- 250/637: Performed by: SPECIALIST

## 2019-08-13 PROCEDURE — 76060000033 HC ANESTHESIA 1 TO 1.5 HR: Performed by: PODIATRIST

## 2019-08-13 PROCEDURE — 77030002986 HC SUT PROL J&J -A: Performed by: PODIATRIST

## 2019-08-13 PROCEDURE — 77030020753 HC CUF TRNQT 1BLA STRY -B: Performed by: PODIATRIST

## 2019-08-13 PROCEDURE — 77030020255 HC SOL INJ LR 1000ML BG: Performed by: PODIATRIST

## 2019-08-13 PROCEDURE — 77030018836 HC SOL IRR NACL ICUM -A: Performed by: PODIATRIST

## 2019-08-13 PROCEDURE — 88307 TISSUE EXAM BY PATHOLOGIST: CPT

## 2019-08-13 PROCEDURE — 77030012508 HC MSK AIRWY LMA AMBU -A: Performed by: SPECIALIST

## 2019-08-13 PROCEDURE — 88305 TISSUE EXAM BY PATHOLOGIST: CPT

## 2019-08-13 PROCEDURE — 74011250636 HC RX REV CODE- 250/636

## 2019-08-13 PROCEDURE — 76210000026 HC REC RM PH II 1 TO 1.5 HR: Performed by: PODIATRIST

## 2019-08-13 PROCEDURE — 74011000250 HC RX REV CODE- 250

## 2019-08-13 PROCEDURE — 77030040361 HC SLV COMPR DVT MDII -B: Performed by: PODIATRIST

## 2019-08-13 PROCEDURE — 74011000250 HC RX REV CODE- 250: Performed by: PODIATRIST

## 2019-08-13 PROCEDURE — 88311 DECALCIFY TISSUE: CPT

## 2019-08-13 PROCEDURE — 74011250636 HC RX REV CODE- 250/636: Performed by: PODIATRIST

## 2019-08-13 PROCEDURE — 82962 GLUCOSE BLOOD TEST: CPT

## 2019-08-13 PROCEDURE — 77030031139 HC SUT VCRL2 J&J -A: Performed by: PODIATRIST

## 2019-08-13 PROCEDURE — 76210000006 HC OR PH I REC 0.5 TO 1 HR: Performed by: PODIATRIST

## 2019-08-13 PROCEDURE — 76010000149 HC OR TIME 1 TO 1.5 HR: Performed by: PODIATRIST

## 2019-08-13 PROCEDURE — 77030020782 HC GWN BAIR PAWS FLX 3M -B: Performed by: PODIATRIST

## 2019-08-13 PROCEDURE — 77030013708 HC HNDPC SUC IRR PULS STRY –B: Performed by: PODIATRIST

## 2019-08-13 RX ORDER — FENTANYL CITRATE 50 UG/ML
INJECTION, SOLUTION INTRAMUSCULAR; INTRAVENOUS AS NEEDED
Status: DISCONTINUED | OUTPATIENT
Start: 2019-08-13 | End: 2019-08-13 | Stop reason: HOSPADM

## 2019-08-13 RX ORDER — CEFAZOLIN SODIUM/WATER 2 G/20 ML
2 SYRINGE (ML) INTRAVENOUS ONCE
Status: COMPLETED | OUTPATIENT
Start: 2019-08-13 | End: 2019-08-13

## 2019-08-13 RX ORDER — EPHEDRINE SULFATE 50 MG/ML
INJECTION, SOLUTION INTRAVENOUS AS NEEDED
Status: DISCONTINUED | OUTPATIENT
Start: 2019-08-13 | End: 2019-08-13 | Stop reason: HOSPADM

## 2019-08-13 RX ORDER — LIDOCAINE HYDROCHLORIDE 20 MG/ML
INJECTION, SOLUTION EPIDURAL; INFILTRATION; INTRACAUDAL; PERINEURAL AS NEEDED
Status: DISCONTINUED | OUTPATIENT
Start: 2019-08-13 | End: 2019-08-13 | Stop reason: HOSPADM

## 2019-08-13 RX ORDER — SODIUM CHLORIDE, SODIUM LACTATE, POTASSIUM CHLORIDE, CALCIUM CHLORIDE 600; 310; 30; 20 MG/100ML; MG/100ML; MG/100ML; MG/100ML
125 INJECTION, SOLUTION INTRAVENOUS CONTINUOUS
Status: DISCONTINUED | OUTPATIENT
Start: 2019-08-13 | End: 2019-08-14 | Stop reason: HOSPADM

## 2019-08-13 RX ORDER — ATENOLOL 25 MG/1
25 TABLET ORAL
Status: COMPLETED | OUTPATIENT
Start: 2019-08-13 | End: 2019-08-13

## 2019-08-13 RX ORDER — MIDAZOLAM HYDROCHLORIDE 1 MG/ML
INJECTION, SOLUTION INTRAMUSCULAR; INTRAVENOUS AS NEEDED
Status: DISCONTINUED | OUTPATIENT
Start: 2019-08-13 | End: 2019-08-13 | Stop reason: HOSPADM

## 2019-08-13 RX ORDER — ONDANSETRON 2 MG/ML
INJECTION INTRAMUSCULAR; INTRAVENOUS AS NEEDED
Status: DISCONTINUED | OUTPATIENT
Start: 2019-08-13 | End: 2019-08-13 | Stop reason: HOSPADM

## 2019-08-13 RX ORDER — METOCLOPRAMIDE HYDROCHLORIDE 5 MG/ML
INJECTION INTRAMUSCULAR; INTRAVENOUS AS NEEDED
Status: DISCONTINUED | OUTPATIENT
Start: 2019-08-13 | End: 2019-08-13 | Stop reason: HOSPADM

## 2019-08-13 RX ORDER — PROPOFOL 10 MG/ML
INJECTION, EMULSION INTRAVENOUS AS NEEDED
Status: DISCONTINUED | OUTPATIENT
Start: 2019-08-13 | End: 2019-08-13 | Stop reason: HOSPADM

## 2019-08-13 RX ADMIN — ATENOLOL 25 MG: 25 TABLET ORAL at 17:40

## 2019-08-13 RX ADMIN — FENTANYL CITRATE 50 MCG: 50 INJECTION, SOLUTION INTRAMUSCULAR; INTRAVENOUS at 18:07

## 2019-08-13 RX ADMIN — LIDOCAINE HYDROCHLORIDE 80 MG: 20 INJECTION, SOLUTION EPIDURAL; INFILTRATION; INTRACAUDAL; PERINEURAL at 17:58

## 2019-08-13 RX ADMIN — PROPOFOL 200 MG: 10 INJECTION, EMULSION INTRAVENOUS at 17:58

## 2019-08-13 RX ADMIN — SODIUM CHLORIDE, SODIUM LACTATE, POTASSIUM CHLORIDE, AND CALCIUM CHLORIDE 125 ML/HR: 600; 310; 30; 20 INJECTION, SOLUTION INTRAVENOUS at 15:54

## 2019-08-13 RX ADMIN — Medication 2 G: at 17:57

## 2019-08-13 RX ADMIN — SODIUM CHLORIDE, SODIUM LACTATE, POTASSIUM CHLORIDE, AND CALCIUM CHLORIDE: 600; 310; 30; 20 INJECTION, SOLUTION INTRAVENOUS at 18:03

## 2019-08-13 RX ADMIN — FENTANYL CITRATE 50 MCG: 50 INJECTION, SOLUTION INTRAMUSCULAR; INTRAVENOUS at 18:15

## 2019-08-13 RX ADMIN — MIDAZOLAM HYDROCHLORIDE 2 MG: 1 INJECTION, SOLUTION INTRAMUSCULAR; INTRAVENOUS at 17:54

## 2019-08-13 RX ADMIN — EPHEDRINE SULFATE 5 MG: 50 INJECTION, SOLUTION INTRAVENOUS at 18:25

## 2019-08-13 RX ADMIN — ONDANSETRON 4 MG: 2 INJECTION INTRAMUSCULAR; INTRAVENOUS at 18:08

## 2019-08-13 RX ADMIN — METOCLOPRAMIDE HYDROCHLORIDE 5 MG: 5 INJECTION INTRAMUSCULAR; INTRAVENOUS at 17:54

## 2019-08-13 NOTE — PERIOP NOTES
TRANSFER - IN REPORT:    Verbal report received from [facility] CRNA(name) on [employer].  being received from OR(unit) for routine progression of care      Report consisted of patients Situation, Background, Assessment and   Recommendations(SBAR). Information from the following report(s) SBAR, OR Summary, Procedure Summary, Intake/Output and Recent Results was reviewed with the receiving nurse. Opportunity for questions and clarification was provided. Assessment completed upon patients arrival to unit and care assumed.

## 2019-08-13 NOTE — BRIEF OP NOTE
BRIEF OPERATIVE NOTE    Date of Procedure: 8/13/2019   Preoperative Diagnosis: SUBACUTE OSTEOARTHRITIS RIGHT FOOT/ANKLE  Postoperative Diagnosis: * No post-op diagnosis entered *    Procedure(s):  RIGHT FOOT AMPUTATION FIRST AND SECOND DIGIT WITH METATARSAL INCISION BONE CORTEX **SPEC POP**  Surgeon(s) and Role:     * Charlene Dumont DPM - Primary         Surgical Assistant: None    Surgical Staff:  Circ-1: Alize Al  Scrub Tech-1: Jose Manuel Estes Tech-2: Sumeet MONTERROSO  Surg Asst-1: Jaqueline Jones  No case tracking events are documented in the log.   Anesthesia: General   Estimated Blood Loss: 30cc  Specimens:   ID Type Source Tests Collected by Time Destination   1 : Right First Toe Preservative Toe  Herman Schultz DPM 8/13/2019 1817 Pathology   2 : Right Second [address]  Herman Schultz DPM 8/13/2019 1817 Pathology      Findings: Necrotic toe   Complications: Nione  Implants: * No implants in log *

## 2019-08-13 NOTE — ANESTHESIA POSTPROCEDURE EVALUATION
Procedure(s): RIGHT FOOT AMPUTATION FIRST AND SECOND DIGIT WITH METATARSAL INCISION BONE CORTEX **SPEC POP**. 
 
general 
 
Anesthesia Post Evaluation Multimodal analgesia: multimodal analgesia used between 6 hours prior to anesthesia start to PACU discharge Patient location during evaluation: PACU Patient participation: complete - patient participated Level of consciousness: awake Pain score: 0 Pain management: adequate Airway patency: patent Anesthetic complications: no 
Cardiovascular status: acceptable and stable Respiratory status: acceptable and room air Hydration status: stable Post anesthesia nausea and vomiting:  none Vitals Value Taken Time /62 8/13/2019  7:35 PM  
Temp 37 °C (98.6 °F) 8/13/2019  7:05 PM  
Pulse 90 8/13/2019  7:39 PM  
Resp 17 8/13/2019  7:39 PM  
SpO2 96 % 8/13/2019  7:39 PM  
Vitals shown include unvalidated device data.

## 2019-08-13 NOTE — ANESTHESIA PREPROCEDURE EVALUATION
Relevant Problems No relevant active problems Anesthetic History No history of anesthetic complications Pertinent negatives: No PONV Review of Systems / Medical History Patient summary reviewed, nursing notes reviewed and pertinent labs reviewed Pulmonary Sleep apnea: No treatment Pertinent negatives: No COPD, asthma and smoker Neuro/Psych Within defined limits Cardiovascular Hypertension: well controlled Dysrhythmias : atrial fibrillation Pertinent negatives: No past MI and CAD 
 
  
GI/Hepatic/Renal 
Within defined limits Pertinent negatives: No liver disease and renal disease Endo/Other Within defined limits Pertinent negatives: No diabetes Other Findings Comments: etoh neg Physical Exam 
 
Airway Mallampati: I 
TM Distance: > 6 cm Mouth opening: Normal 
 
 Cardiovascular Dental 
 
Dentition: Caps/crowns Pulmonary Abdominal 
 
 
 
 Other Findings Anesthetic Plan ASA: 3 Anesthesia type: general 
 
 
 
 
Induction: Intravenous Anesthetic plan and risks discussed with: Patient

## 2019-08-13 NOTE — INTERVAL H&P NOTE
H&P Update:  Odessa Medina. was seen and examined. History and physical has been reviewed. The patient has been examined.  There have been no significant clinical changes since the completion of the originally dated History and Physical.

## 2019-08-14 NOTE — OP NOTES
[facility] 
OPERATIVE REPORT Name:  Rinku Purcell 
MR#:   [id number] :  1940 ACCOUNT #:  [de-identified] DATE OF SERVICE:  2019 PREOPERATIVE DIAGNOSIS:  Osteomyelitis, right foot. POSTOPERATIVE DIAGNOSIS:  Osteomyelitis, right foot. PROCEDURE PERFORMED: 
1. Amputation of right great toe with metatarsal. 
2.  Amputation of his right second toe with metatarsal. 
3. Soft tissue rearrangement of the right foot < 20cm SURGEON:  Corrinne Brakeman, DPM 
 
ASSISTANT:  None ANESTHESIA:  General. 
 
COMPLICATIONS:  None SPECIMENS REMOVED:  Toes IMPLANTS:  None ESTIMATED BLOOD LOSS:  50cc INDICATIONS:  This is an unfortunate 50-year-old male, who sustained osteomyelitis of the right first and second digits secondary to longstanding open wounds. After careful consideration of all risks, benefits, and alternatives, we decided to move forward with amputation of the affected toes given the underlying deformity and the significant underlying infection of the toe. Complications include delayed healing, nonhealing, continued pain and discomfort, and recurrent infection. No guarantees were given. Informed consent was obtained. PREPARATION AND PROCEDURE:  The patient was taken to the operating room and placed on the table in supine position. After induction of IV sedation and administration of general anesthetic, a well-padded pneumatic ankle tourniquet was applied and the patients right foot was then prepped and draped in the usual sterile manner. The limb was then exsanguinated with the use of elevation for 30 seconds, after which point, the pneumatic ankle tourniquet was inflated to 250 mmHg. PROCEDURE #1:  Amputation of right great toe and metatarsal.  Attention now directed to medial aspect of the right first metatarsophalangeal joint, after which point, a racquet-shaped incision was made around the first metatarsophalangeal joint. Next, the incision was deepened down to the level of the underlying first metatarsophalangeal joint with the scalpel blade and the great toe was disarticulated, removed, and sent to Pathology for gross microscopic examination. Next, the metatarsal head was resected with a bone rongeur. Surgical site was copiously irrigated with normal sterile saline solution. The capsule and periosteum were repaired with the use of 3-0 Vicryl. Subcutaneous tissue repaired with 3-0 Vicryl, and skin closed with the use of 3-0 Prolene. PROCEDURE #2:  Amputation of right second toe and metatarsal.  Attention now directed to the distal aspect of the right second toe. A racquet-shaped incision was made around the second metatarsophalangeal joint and deepened down to the level of subcutaneous tissue. I deepened the incision down to the level of the underlying second metatarsophalangeal joint, after which point, the toe was disarticulated and sent to Pathology for gross microscopic examination. The incision was deepened down to the level of the underlying metatarsal, after which point, the metatarsal head was resected with the use of a bone rongeur. Surgical site was then copiously irrigated with normal sterile saline solution. The capsule and subcutaneous tissue were repaired with the use of a 3-0 Vicryl. Subcutaneous tissue was repaired with 3-0 Vicryl and skin closed with the use of 3-0 Prolene in a simple suture fashion. Surgical site was then dressed with Xeroform, 4x4's, Andressa, and Ace bandage dressing. The patient was then transported from the OR to the PACU with vital signs stable and vascular status intact as indicated by capillary refill of digits three through five of the right foot as well as the flap where the great toe and second toe were amputated. PROCEDURE #3: Soft tissue rearrangement less than 20cm At this point a large deficit was noted around the RIGHT foot at the site of amputation of the first and second toes of the RIGHT foot. Wound closure was quite difficult given the large amount of skin necrosis and infection. I extended the incision proximally and distally. I undermined the wound margins performed a soft tissue rotational rearrangement in order to close the wound. I was able to free of the medial aspect of the RIGHT foot undermined the subcutaneous tissue and by extending the incision proximally and distally I was able to rotate the flap laterally to gain adequate skin coverage of the wound. There was some tension on the wound but by also perform a similar procedure on the lateral side I was able to get wound closure with minimal tension. 3-0 Prolene was used to repair the wound after subcutaneous 3-0 Vicryl was used as well. Xeroform, 4x4 dressing applied. Pt transferred from OR to PACU with VSS and adequate vascular status to the flap as indicated by cap refill. Larissa Calix DPM 
 
 
[id number] 
D:  08/13/2019 18:57 T:  08/14/2019 0:56 JOB #:  [id number]

## 2019-08-14 NOTE — DISCHARGE INSTRUCTIONS
Resume pre-hospital diet. Take medications as prescribed. Keep dressing clean, dry, and intact. Partial weight bearing with a walker/cane and in surgical shoe. DISCHARGE SUMMARY from Nurse    PATIENT INSTRUCTIONS:    After general anesthesia or intravenous sedation, for 24 hours or while taking prescription Narcotics:  · Limit your activities  · Do not drive and operate hazardous machinery  · Do not make important personal or business decisions  · Do  not drink alcoholic beverages  · If you have not urinated within 8 hours after discharge, please contact your surgeon on call. Report the following to your surgeon:  · Excessive pain, swelling, redness or odor of or around the surgical area  · Temperature over 100.5  · Nausea and vomiting lasting longer than 4 hours or if unable to take medications  · Any signs of decreased circulation or nerve impairment to extremity: change in color, persistent  numbness, tingling, coldness or increase pain  · Any questions    What to do at Home:  Recommended activity: Ambulate in house and No driving while on analgesics. If you experience any of the following symptoms fever, chills, uncontrollable pain, persistent nausea/vomiting, please follow up with Dr. Naren Constantino. *  Please give a list of your current medications to your Primary Care Provider. *  Please update this list whenever your medications are discontinued, doses are      changed, or new medications (including over-the-counter products) are added. *  Please carry medication information at all times in case of emergency situations. These are general instructions for a healthy lifestyle:    No smoking/ No tobacco products/ Avoid exposure to second hand smoke  Surgeon General's Warning:  Quitting smoking now greatly reduces serious risk to your health.     Obesity, smoking, and sedentary lifestyle greatly increases your risk for illness    A healthy diet, regular physical exercise & weight monitoring are important for maintaining a healthy lifestyle    You may be retaining fluid if you have a history of heart failure or if you experience any of the following symptoms:  Weight gain of 3 pounds or more overnight or 5 pounds in a week, increased swelling in our hands or feet or shortness of breath while lying flat in bed. Please call your doctor as soon as you notice any of these symptoms; do not wait until your next office visit. The discharge information has been reviewed with the patient and caregiver. The patient and caregiver verbalized understanding. Discharge medications reviewed with the patient and caregiver and appropriate educational materials and side effects teaching were provided. ___________________________________________________________________________________________________________________________________    Patient armband removed and shredded.

## 2021-01-01 ENCOUNTER — ANESTHESIA (OUTPATIENT)
Dept: SURGERY | Age: 81
DRG: 616 | End: 2021-01-01
Payer: MEDICARE

## 2021-01-01 ENCOUNTER — PATIENT OUTREACH (OUTPATIENT)
Dept: CASE MANAGEMENT | Age: 81
End: 2021-01-01

## 2021-01-01 ENCOUNTER — APPOINTMENT (OUTPATIENT)
Dept: GENERAL RADIOLOGY | Age: 81
DRG: 871 | End: 2021-01-01
Attending: EMERGENCY MEDICINE
Payer: MEDICARE

## 2021-01-01 ENCOUNTER — APPOINTMENT (OUTPATIENT)
Dept: VASCULAR SURGERY | Age: 81
DRG: 616 | End: 2021-01-01
Attending: INTERNAL MEDICINE
Payer: MEDICARE

## 2021-01-01 ENCOUNTER — APPOINTMENT (OUTPATIENT)
Dept: CT IMAGING | Age: 81
DRG: 616 | End: 2021-01-01
Attending: HOSPITALIST
Payer: MEDICARE

## 2021-01-01 ENCOUNTER — HOSPITAL ENCOUNTER (OUTPATIENT)
Dept: INTERVENTIONAL RADIOLOGY/VASCULAR | Age: 81
Discharge: HOME | End: 2021-01-22
Attending: INTERNAL MEDICINE

## 2021-01-01 ENCOUNTER — HOSPITAL ENCOUNTER (OUTPATIENT)
Dept: LAB | Age: 81
Discharge: HOME | End: 2021-02-02

## 2021-01-01 ENCOUNTER — APPOINTMENT (OUTPATIENT)
Dept: MRI IMAGING | Age: 81
DRG: 616 | End: 2021-01-01
Attending: INTERNAL MEDICINE
Payer: MEDICARE

## 2021-01-01 ENCOUNTER — HOSPITAL ENCOUNTER (OUTPATIENT)
Dept: LAB | Age: 81
Discharge: HOME | End: 2021-02-10
Payer: MEDICARE

## 2021-01-01 ENCOUNTER — HOSPITAL ENCOUNTER (OUTPATIENT)
Dept: LAB | Age: 81
Discharge: HOME | End: 2021-02-21

## 2021-01-01 ENCOUNTER — APPOINTMENT (OUTPATIENT)
Dept: GENERAL RADIOLOGY | Age: 81
DRG: 616 | End: 2021-01-01
Attending: EMERGENCY MEDICINE
Payer: MEDICARE

## 2021-01-01 ENCOUNTER — ANESTHESIA EVENT (OUTPATIENT)
Dept: SURGERY | Age: 81
DRG: 616 | End: 2021-01-01
Payer: MEDICARE

## 2021-01-01 ENCOUNTER — HOSPITAL ENCOUNTER (OUTPATIENT)
Dept: LAB | Age: 81
Discharge: HOME | End: 2021-01-27

## 2021-01-01 ENCOUNTER — APPOINTMENT (OUTPATIENT)
Dept: CT IMAGING | Age: 81
DRG: 871 | End: 2021-01-01
Attending: FAMILY MEDICINE
Payer: MEDICARE

## 2021-01-01 ENCOUNTER — HOSPITAL ENCOUNTER (OUTPATIENT)
Dept: LAB | Age: 81
Discharge: HOME | End: 2021-01-28

## 2021-01-01 ENCOUNTER — HOSPITAL ENCOUNTER (OUTPATIENT)
Dept: WOUND CARE | Age: 81
Discharge: HOME | End: 2021-02-17
Attending: INTERNAL MEDICINE
Payer: COMMERCIAL

## 2021-01-01 ENCOUNTER — HOSPITAL ENCOUNTER (OUTPATIENT)
Dept: LAB | Age: 81
Discharge: HOME | End: 2021-02-12

## 2021-01-01 ENCOUNTER — HOSPITAL ENCOUNTER (INPATIENT)
Age: 81
LOS: 13 days | Discharge: NURSING, REHAB OR LONG-TERM CARE | DRG: 616 | End: 2021-01-26
Attending: EMERGENCY MEDICINE | Admitting: INTERNAL MEDICINE
Payer: MEDICARE

## 2021-01-01 ENCOUNTER — HOSPITAL ENCOUNTER (INPATIENT)
Age: 81
LOS: 1 days | DRG: 871 | End: 2021-02-24
Attending: EMERGENCY MEDICINE | Admitting: FAMILY MEDICINE
Payer: MEDICARE

## 2021-01-01 ENCOUNTER — HOSPITAL ENCOUNTER (OUTPATIENT)
Dept: LAB | Age: 81
Discharge: HOME | End: 2021-02-16

## 2021-01-01 ENCOUNTER — HOSPITAL ENCOUNTER (OUTPATIENT)
Dept: LAB | Age: 81
Discharge: HOME | End: 2021-02-22

## 2021-01-01 ENCOUNTER — APPOINTMENT (OUTPATIENT)
Dept: NON INVASIVE DIAGNOSTICS | Age: 81
DRG: 616 | End: 2021-01-01
Attending: INTERNAL MEDICINE
Payer: MEDICARE

## 2021-01-01 ENCOUNTER — APPOINTMENT (OUTPATIENT)
Dept: CT IMAGING | Age: 81
DRG: 616 | End: 2021-01-01
Attending: EMERGENCY MEDICINE
Payer: MEDICARE

## 2021-01-01 ENCOUNTER — HOSPITAL ENCOUNTER (OUTPATIENT)
Dept: LAB | Age: 81
Discharge: HOME | End: 2021-02-01

## 2021-01-01 ENCOUNTER — HOSPITAL ENCOUNTER (EMERGENCY)
Age: 81
Discharge: HOME | End: 2021-01-08
Attending: EMERGENCY MEDICINE
Payer: MEDICARE

## 2021-01-01 ENCOUNTER — HOSPITAL ENCOUNTER (OUTPATIENT)
Dept: LAB | Age: 81
Discharge: HOME | End: 2021-02-06
Payer: MEDICARE

## 2021-01-01 ENCOUNTER — HOSPITAL ENCOUNTER (OUTPATIENT)
Dept: LAB | Age: 81
Discharge: HOME | End: 2021-01-31

## 2021-01-01 ENCOUNTER — APPOINTMENT (OUTPATIENT)
Dept: INTERVENTIONAL RADIOLOGY/VASCULAR | Age: 81
DRG: 616 | End: 2021-01-01
Attending: HOSPITALIST
Payer: MEDICARE

## 2021-01-01 ENCOUNTER — HOSPITAL ENCOUNTER (OUTPATIENT)
Dept: LAB | Age: 81
Discharge: HOME | End: 2021-02-17

## 2021-01-01 ENCOUNTER — HOSPITAL ENCOUNTER (OUTPATIENT)
Dept: LAB | Age: 81
Discharge: HOME | End: 2021-02-03

## 2021-01-01 ENCOUNTER — HOSPITAL ENCOUNTER (OUTPATIENT)
Dept: LAB | Age: 81
Discharge: HOME | End: 2021-02-08
Payer: MEDICARE

## 2021-01-01 ENCOUNTER — APPOINTMENT (OUTPATIENT)
Dept: NON INVASIVE DIAGNOSTICS | Age: 81
DRG: 871 | End: 2021-01-01
Attending: FAMILY MEDICINE
Payer: MEDICARE

## 2021-01-01 ENCOUNTER — HOSPITAL ENCOUNTER (OUTPATIENT)
Dept: LAB | Age: 81
Discharge: HOME | End: 2021-02-15

## 2021-01-01 ENCOUNTER — HOSPITAL ENCOUNTER (OUTPATIENT)
Dept: LAB | Age: 81
Discharge: HOME | DRG: 871 | End: 2021-02-20
Payer: MEDICARE

## 2021-01-01 ENCOUNTER — APPOINTMENT (OUTPATIENT)
Dept: GENERAL RADIOLOGY | Age: 81
End: 2021-01-01
Attending: EMERGENCY MEDICINE
Payer: MEDICARE

## 2021-01-01 ENCOUNTER — HOSPITAL ENCOUNTER (OUTPATIENT)
Dept: LAB | Age: 81
Discharge: HOME | End: 2021-02-12
Payer: MEDICARE

## 2021-01-01 ENCOUNTER — HOSPITAL ENCOUNTER (OUTPATIENT)
Dept: LAB | Age: 81
Discharge: HOME | End: 2021-02-09

## 2021-01-01 VITALS
DIASTOLIC BLOOD PRESSURE: 32 MMHG | TEMPERATURE: 93 F | WEIGHT: 220 LBS | HEIGHT: 72 IN | BODY MASS INDEX: 29.8 KG/M2 | RESPIRATION RATE: 26 BRPM | SYSTOLIC BLOOD PRESSURE: 61 MMHG | OXYGEN SATURATION: 77 % | HEART RATE: 65 BPM

## 2021-01-01 VITALS
HEART RATE: 75 BPM | TEMPERATURE: 97.5 F | SYSTOLIC BLOOD PRESSURE: 140 MMHG | WEIGHT: 233 LBS | DIASTOLIC BLOOD PRESSURE: 62 MMHG | RESPIRATION RATE: 16 BRPM | BODY MASS INDEX: 31.56 KG/M2 | OXYGEN SATURATION: 100 % | HEIGHT: 72 IN

## 2021-01-01 VITALS
WEIGHT: 224.87 LBS | HEART RATE: 98 BPM | RESPIRATION RATE: 20 BRPM | OXYGEN SATURATION: 96 % | HEIGHT: 72 IN | BODY MASS INDEX: 30.46 KG/M2 | DIASTOLIC BLOOD PRESSURE: 80 MMHG | TEMPERATURE: 98.5 F | SYSTOLIC BLOOD PRESSURE: 152 MMHG

## 2021-01-01 VITALS
RESPIRATION RATE: 18 BRPM | HEART RATE: 72 BPM | SYSTOLIC BLOOD PRESSURE: 122 MMHG | DIASTOLIC BLOOD PRESSURE: 67 MMHG | TEMPERATURE: 96.2 F | OXYGEN SATURATION: 100 %

## 2021-01-01 DIAGNOSIS — D64.9 CHRONIC ANEMIA: ICD-10-CM

## 2021-01-01 DIAGNOSIS — S20.219A CONTUSION OF CHEST WALL, UNSPECIFIED LATERALITY, INITIAL ENCOUNTER: Primary | ICD-10-CM

## 2021-01-01 DIAGNOSIS — I48.0 PAROXYSMAL A-FIB (HCC): ICD-10-CM

## 2021-01-01 DIAGNOSIS — R53.81 DEBILITY: ICD-10-CM

## 2021-01-01 DIAGNOSIS — I50.9 ACUTE ON CHRONIC CONGESTIVE HEART FAILURE, UNSPECIFIED HEART FAILURE TYPE (HCC): ICD-10-CM

## 2021-01-01 DIAGNOSIS — L03.115 CELLULITIS OF LEG, RIGHT: ICD-10-CM

## 2021-01-01 DIAGNOSIS — R06.03 ACUTE RESPIRATORY DISTRESS: ICD-10-CM

## 2021-01-01 DIAGNOSIS — I50.9 ACUTE CONGESTIVE HEART FAILURE, UNSPECIFIED HEART FAILURE TYPE (HCC): Primary | ICD-10-CM

## 2021-01-01 DIAGNOSIS — R07.81 PLEURITIC CHEST PAIN: ICD-10-CM

## 2021-01-01 DIAGNOSIS — Z71.89 ADVANCED CARE PLANNING/COUNSELING DISCUSSION: ICD-10-CM

## 2021-01-01 DIAGNOSIS — Z79.01 ENCOUNTER FOR CURRENT LONG-TERM USE OF ANTICOAGULANTS: ICD-10-CM

## 2021-01-01 DIAGNOSIS — R09.02 HYPOXIA: Primary | ICD-10-CM

## 2021-01-01 DIAGNOSIS — I89.0 LYMPHEDEMA: ICD-10-CM

## 2021-01-01 DIAGNOSIS — E87.70 HYPERVOLEMIA, UNSPECIFIED HYPERVOLEMIA TYPE: ICD-10-CM

## 2021-01-01 LAB
ABO + RH BLD: NORMAL
ABO + RH BLD: NORMAL
ALBUMIN SERPL-MCNC: 2.2 G/DL (ref 3.4–5)
ALBUMIN SERPL-MCNC: 2.2 G/DL (ref 3.4–5)
ALBUMIN SERPL-MCNC: 2.3 G/DL (ref 3.4–5)
ALBUMIN SERPL-MCNC: 2.4 G/DL (ref 3.4–5)
ALBUMIN SERPL-MCNC: 2.4 G/DL (ref 3.4–5)
ALBUMIN SERPL-MCNC: 2.6 G/DL (ref 3.4–5)
ALBUMIN SERPL-MCNC: 2.7 G/DL (ref 3.4–5)
ALBUMIN SERPL-MCNC: 2.8 G/DL (ref 3.4–5)
ALBUMIN SERPL-MCNC: 2.9 G/DL (ref 3.4–5)
ALBUMIN/GLOB SERPL: 0.6 {RATIO} (ref 0.8–1.7)
ALBUMIN/GLOB SERPL: 0.7 {RATIO} (ref 0.8–1.7)
ALBUMIN/GLOB SERPL: 0.8 {RATIO} (ref 0.8–1.7)
ALBUMIN/GLOB SERPL: 0.8 {RATIO} (ref 0.8–1.7)
ALP SERPL-CCNC: 109 U/L (ref 45–117)
ALP SERPL-CCNC: 120 U/L (ref 45–117)
ALP SERPL-CCNC: 132 U/L (ref 45–117)
ALP SERPL-CCNC: 133 U/L (ref 45–117)
ALP SERPL-CCNC: 137 U/L (ref 45–117)
ALP SERPL-CCNC: 82 U/L (ref 45–117)
ALP SERPL-CCNC: 84 U/L (ref 45–117)
ALP SERPL-CCNC: 88 U/L (ref 45–117)
ALP SERPL-CCNC: 90 U/L (ref 45–117)
ALT SERPL-CCNC: 12 U/L (ref 16–61)
ALT SERPL-CCNC: 13 U/L (ref 16–61)
ALT SERPL-CCNC: 14 U/L (ref 16–61)
ALT SERPL-CCNC: 14 U/L (ref 16–61)
ALT SERPL-CCNC: 23 U/L (ref 16–61)
ALT SERPL-CCNC: 25 U/L (ref 16–61)
ALT SERPL-CCNC: 25 U/L (ref 16–61)
ALT SERPL-CCNC: 26 U/L (ref 16–61)
ALT SERPL-CCNC: 30 U/L (ref 16–61)
AMMONIA PLAS-SCNC: 11 UMOL/L (ref 11–32)
AMORPH CRY URNS QL MICRO: ABNORMAL
ANION GAP SERPL CALC-SCNC: 10 MMOL/L (ref 3–18)
ANION GAP SERPL CALC-SCNC: 11 MMOL/L (ref 3–18)
ANION GAP SERPL CALC-SCNC: 11 MMOL/L (ref 3–18)
ANION GAP SERPL CALC-SCNC: 12 MMOL/L (ref 3–18)
ANION GAP SERPL CALC-SCNC: 13 MMOL/L (ref 3–18)
ANION GAP SERPL CALC-SCNC: 15 MMOL/L (ref 3–18)
ANION GAP SERPL CALC-SCNC: 15 MMOL/L (ref 3–18)
ANION GAP SERPL CALC-SCNC: 7 MMOL/L (ref 3–18)
ANION GAP SERPL CALC-SCNC: 7 MMOL/L (ref 3–18)
ANION GAP SERPL CALC-SCNC: 8 MMOL/L (ref 3–18)
ANION GAP SERPL CALC-SCNC: 9 MMOL/L (ref 3–18)
APPEARANCE UR: ABNORMAL
APPEARANCE UR: CLEAR
APTT PPP: 101.2 SEC (ref 23–36.4)
APTT PPP: 115.6 SEC (ref 23–36.4)
APTT PPP: 116.9 SEC (ref 23–36.4)
APTT PPP: 119.1 SEC (ref 23–36.4)
APTT PPP: 126.9 SEC (ref 23–36.4)
APTT PPP: 140 SEC (ref 23–36.4)
APTT PPP: 146.4 SEC (ref 23–36.4)
APTT PPP: 157.9 SEC (ref 23–36.4)
APTT PPP: 34 SEC (ref 23–36.4)
APTT PPP: 36.2 SEC (ref 23–36.4)
APTT PPP: 42.6 SEC (ref 23–36.4)
APTT PPP: 44.9 SEC (ref 23–36.4)
APTT PPP: 46 SEC (ref 23–36.4)
APTT PPP: 61.1 SEC (ref 23–36.4)
APTT PPP: 71.3 SEC (ref 23–36.4)
APTT PPP: 85.4 SEC (ref 23–36.4)
APTT PPP: 89.7 SEC (ref 23–36.4)
APTT PPP: >180 SEC (ref 23–36.4)
ARTERIAL PATENCY WRIST A: ABNORMAL
AST SERPL-CCNC: 13 U/L (ref 10–38)
AST SERPL-CCNC: 14 U/L (ref 10–38)
AST SERPL-CCNC: 17 U/L (ref 10–38)
AST SERPL-CCNC: 23 U/L (ref 10–38)
AST SERPL-CCNC: 27 U/L (ref 10–38)
AST SERPL-CCNC: 31 U/L (ref 10–38)
AST SERPL-CCNC: 32 U/L (ref 10–38)
AST SERPL-CCNC: 34 U/L (ref 10–38)
AST SERPL-CCNC: 35 U/L (ref 10–38)
ATRIAL RATE: 105 BPM
ATRIAL RATE: 52 BPM
ATRIAL RATE: 94 BPM
AV VELOCITY RATIO: 0.77
AV VTI RATIO: 0.9
BACTERIA SPEC CULT: ABNORMAL
BACTERIA SPEC CULT: NORMAL
BACTERIA URNS QL MICRO: ABNORMAL /HPF
BASE DEFICIT BLD-SCNC: 11 MMOL/L
BASOPHILS # BLD: 0 K/UL (ref 0–0.1)
BASOPHILS # BLD: 0.1 K/UL (ref 0–0.1)
BASOPHILS NFR BLD: 0 % (ref 0–2)
BASOPHILS NFR BLD: 0 % (ref 0–3)
BASOPHILS NFR BLD: 1 % (ref 0–2)
BASOPHILS NFR BLD: 1 % (ref 0–3)
BDY SITE: ABNORMAL
BILIRUB DIRECT SERPL-MCNC: 0.2 MG/DL (ref 0–0.2)
BILIRUB SERPL-MCNC: 0.5 MG/DL (ref 0.2–1)
BILIRUB SERPL-MCNC: 0.6 MG/DL (ref 0.2–1)
BILIRUB SERPL-MCNC: 0.6 MG/DL (ref 0.2–1)
BILIRUB SERPL-MCNC: 0.7 MG/DL (ref 0.2–1)
BILIRUB SERPL-MCNC: 0.8 MG/DL (ref 0.2–1)
BILIRUB SERPL-MCNC: 1 MG/DL (ref 0.2–1)
BILIRUB SERPL-MCNC: 1.1 MG/DL (ref 0.2–1)
BILIRUB UR QL: NEGATIVE
BILIRUB UR QL: NEGATIVE
BLD PROD TYP BPU: NORMAL
BLD PROD TYP BPU: NORMAL
BLOOD GROUP ANTIBODIES SERPL: NORMAL
BLOOD GROUP ANTIBODIES SERPL: NORMAL
BNP SERPL-MCNC: ABNORMAL PG/ML (ref 0–1800)
BODY TEMPERATURE: 97.8
BPU ID: NORMAL
BPU ID: NORMAL
BUN SERPL-MCNC: 100 MG/DL (ref 7–18)
BUN SERPL-MCNC: 104 MG/DL (ref 7–18)
BUN SERPL-MCNC: 105 MG/DL (ref 7–18)
BUN SERPL-MCNC: 112 MG/DL (ref 7–18)
BUN SERPL-MCNC: 115 MG/DL (ref 7–18)
BUN SERPL-MCNC: 33 MG/DL (ref 7–18)
BUN SERPL-MCNC: 42 MG/DL (ref 7–18)
BUN SERPL-MCNC: 43 MG/DL (ref 7–18)
BUN SERPL-MCNC: 48 MG/DL (ref 7–18)
BUN SERPL-MCNC: 60 MG/DL (ref 7–18)
BUN SERPL-MCNC: 61 MG/DL (ref 7–18)
BUN SERPL-MCNC: 63 MG/DL (ref 7–18)
BUN SERPL-MCNC: 66 MG/DL (ref 7–18)
BUN SERPL-MCNC: 68 MG/DL (ref 7–18)
BUN SERPL-MCNC: 69 MG/DL (ref 7–18)
BUN SERPL-MCNC: 69 MG/DL (ref 7–18)
BUN SERPL-MCNC: 70 MG/DL (ref 7–18)
BUN SERPL-MCNC: 71 MG/DL (ref 7–18)
BUN SERPL-MCNC: 74 MG/DL (ref 7–18)
BUN SERPL-MCNC: 75 MG/DL (ref 7–18)
BUN SERPL-MCNC: 75 MG/DL (ref 7–18)
BUN SERPL-MCNC: 79 MG/DL (ref 7–18)
BUN SERPL-MCNC: 92 MG/DL (ref 7–18)
BUN SERPL-MCNC: 93 MG/DL (ref 7–18)
BUN/CREAT SERPL: 20 (ref 12–20)
BUN/CREAT SERPL: 21 (ref 12–20)
BUN/CREAT SERPL: 21 (ref 12–20)
BUN/CREAT SERPL: 22 (ref 12–20)
BUN/CREAT SERPL: 23 (ref 12–20)
BUN/CREAT SERPL: 23 (ref 12–20)
BUN/CREAT SERPL: 24 (ref 12–20)
BUN/CREAT SERPL: 25 (ref 12–20)
BUN/CREAT SERPL: 25 (ref 12–20)
BUN/CREAT SERPL: 26 (ref 12–20)
BUN/CREAT SERPL: 26 (ref 12–20)
BUN/CREAT SERPL: 27 (ref 12–20)
BUN/CREAT SERPL: 28 (ref 12–20)
BUN/CREAT SERPL: 28 (ref 12–20)
BUN/CREAT SERPL: 29 (ref 12–20)
CALCIUM SERPL-MCNC: 7.5 MG/DL (ref 8.5–10.1)
CALCIUM SERPL-MCNC: 7.7 MG/DL (ref 8.5–10.1)
CALCIUM SERPL-MCNC: 7.8 MG/DL (ref 8.5–10.1)
CALCIUM SERPL-MCNC: 7.9 MG/DL (ref 8.5–10.1)
CALCIUM SERPL-MCNC: 8 MG/DL (ref 8.5–10.1)
CALCIUM SERPL-MCNC: 8.1 MG/DL (ref 8.5–10.1)
CALCIUM SERPL-MCNC: 8.2 MG/DL (ref 8.5–10.1)
CALCIUM SERPL-MCNC: 8.3 MG/DL (ref 8.5–10.1)
CALCIUM SERPL-MCNC: 8.6 MG/DL (ref 8.5–10.1)
CALCIUM SERPL-MCNC: 8.8 MG/DL (ref 8.5–10.1)
CALCULATED R AXIS, ECG10: -71 DEGREES
CALCULATED R AXIS, ECG10: -77 DEGREES
CALCULATED R AXIS, ECG10: 125 DEGREES
CALCULATED T AXIS, ECG11: 44 DEGREES
CALCULATED T AXIS, ECG11: 67 DEGREES
CALLED TO:,BCALL1: NORMAL
CHLORIDE SERPL-SCNC: 100 MMOL/L (ref 100–111)
CHLORIDE SERPL-SCNC: 101 MMOL/L (ref 100–111)
CHLORIDE SERPL-SCNC: 103 MMOL/L (ref 100–111)
CHLORIDE SERPL-SCNC: 104 MMOL/L (ref 100–111)
CHLORIDE SERPL-SCNC: 105 MMOL/L (ref 100–111)
CHLORIDE SERPL-SCNC: 108 MMOL/L (ref 100–111)
CHLORIDE SERPL-SCNC: 110 MMOL/L (ref 100–111)
CHLORIDE SERPL-SCNC: 111 MMOL/L (ref 100–111)
CHLORIDE SERPL-SCNC: 112 MMOL/L (ref 100–111)
CHLORIDE SERPL-SCNC: 113 MMOL/L (ref 100–111)
CHLORIDE SERPL-SCNC: 114 MMOL/L (ref 100–111)
CHLORIDE SERPL-SCNC: 114 MMOL/L (ref 100–111)
CHLORIDE SERPL-SCNC: 121 MMOL/L (ref 100–111)
CK MB CFR SERPL CALC: 1.9 % (ref 0–4)
CK MB CFR SERPL CALC: 12.1 % (ref 0–4)
CK MB CFR SERPL CALC: 14.4 % (ref 0–4)
CK MB CFR SERPL CALC: 2.9 % (ref 0–4)
CK MB CFR SERPL CALC: 3.1 % (ref 0–4)
CK MB CFR SERPL CALC: 3.5 % (ref 0–4)
CK MB CFR SERPL CALC: 3.5 % (ref 0–4)
CK MB SERPL-MCNC: 1.8 NG/ML (ref 5–25)
CK MB SERPL-MCNC: 2.9 NG/ML (ref 5–25)
CK MB SERPL-MCNC: 3.5 NG/ML (ref 5–25)
CK MB SERPL-MCNC: 3.5 NG/ML (ref 5–25)
CK MB SERPL-MCNC: 3.7 NG/ML (ref 5–25)
CK MB SERPL-MCNC: 7 NG/ML (ref 5–25)
CK MB SERPL-MCNC: 9.2 NG/ML (ref 5–25)
CK SERPL-CCNC: 100 U/L (ref 39–308)
CK SERPL-CCNC: 101 U/L (ref 39–308)
CK SERPL-CCNC: 105 U/L (ref 39–308)
CK SERPL-CCNC: 114 U/L (ref 39–308)
CK SERPL-CCNC: 58 U/L (ref 39–308)
CK SERPL-CCNC: 64 U/L (ref 39–308)
CK SERPL-CCNC: 94 U/L (ref 39–308)
CO2 SERPL-SCNC: 14 MMOL/L (ref 21–32)
CO2 SERPL-SCNC: 16 MMOL/L (ref 21–32)
CO2 SERPL-SCNC: 17 MMOL/L (ref 21–32)
CO2 SERPL-SCNC: 18 MMOL/L (ref 21–32)
CO2 SERPL-SCNC: 18 MMOL/L (ref 21–32)
CO2 SERPL-SCNC: 19 MMOL/L (ref 21–32)
CO2 SERPL-SCNC: 19 MMOL/L (ref 21–32)
CO2 SERPL-SCNC: 21 MMOL/L (ref 21–32)
CO2 SERPL-SCNC: 22 MMOL/L (ref 21–32)
CO2 SERPL-SCNC: 23 MMOL/L (ref 21–32)
CO2 SERPL-SCNC: 24 MMOL/L (ref 21–32)
CO2 SERPL-SCNC: 24 MMOL/L (ref 21–32)
CO2 SERPL-SCNC: 25 MMOL/L (ref 21–32)
CO2 SERPL-SCNC: 25 MMOL/L (ref 21–32)
CO2 SERPL-SCNC: 26 MMOL/L (ref 21–32)
COLOR UR: ABNORMAL
COLOR UR: YELLOW
COVID-19 RAPID TEST, COVR: NOT DETECTED
COVID-19 RAPID TEST, COVR: NOT DETECTED
CREAT SERPL-MCNC: 1.62 MG/DL (ref 0.6–1.3)
CREAT SERPL-MCNC: 1.66 MG/DL (ref 0.6–1.3)
CREAT SERPL-MCNC: 1.67 MG/DL (ref 0.6–1.3)
CREAT SERPL-MCNC: 2.15 MG/DL (ref 0.6–1.3)
CREAT SERPL-MCNC: 2.2 MG/DL (ref 0.6–1.3)
CREAT SERPL-MCNC: 2.23 MG/DL (ref 0.6–1.3)
CREAT SERPL-MCNC: 2.34 MG/DL (ref 0.6–1.3)
CREAT SERPL-MCNC: 2.38 MG/DL (ref 0.6–1.3)
CREAT SERPL-MCNC: 2.44 MG/DL (ref 0.6–1.3)
CREAT SERPL-MCNC: 2.47 MG/DL (ref 0.6–1.3)
CREAT SERPL-MCNC: 2.54 MG/DL (ref 0.6–1.3)
CREAT SERPL-MCNC: 2.55 MG/DL (ref 0.6–1.3)
CREAT SERPL-MCNC: 2.62 MG/DL (ref 0.6–1.3)
CREAT SERPL-MCNC: 2.75 MG/DL (ref 0.6–1.3)
CREAT SERPL-MCNC: 2.81 MG/DL (ref 0.6–1.3)
CREAT SERPL-MCNC: 3.15 MG/DL (ref 0.6–1.3)
CREAT SERPL-MCNC: 3.29 MG/DL (ref 0.6–1.3)
CREAT SERPL-MCNC: 3.35 MG/DL (ref 0.6–1.3)
CREAT SERPL-MCNC: 3.37 MG/DL (ref 0.6–1.3)
CREAT SERPL-MCNC: 3.58 MG/DL (ref 0.6–1.3)
CREAT SERPL-MCNC: 3.63 MG/DL (ref 0.6–1.3)
CREAT SERPL-MCNC: 4.32 MG/DL (ref 0.6–1.3)
CREAT SERPL-MCNC: 4.39 MG/DL (ref 0.6–1.3)
CREAT SERPL-MCNC: 4.87 MG/DL (ref 0.6–1.3)
CREAT SERPL-MCNC: 4.88 MG/DL (ref 0.6–1.3)
CREAT SERPL-MCNC: 5.07 MG/DL (ref 0.6–1.3)
CROSSMATCH RESULT,%XM: NORMAL
CROSSMATCH RESULT,%XM: NORMAL
CRP SERPL-MCNC: 10.2 MG/DL (ref 0–0.3)
CRP SERPL-MCNC: 11.4 MG/DL (ref 0–0.3)
CRP SERPL-MCNC: 12.3 MG/DL (ref 0–0.3)
CRP SERPL-MCNC: 12.4 MG/DL (ref 0–0.3)
CRP SERPL-MCNC: 13.3 MG/DL (ref 0–0.3)
D DIMER PPP FEU-MCNC: 2.71 UG/ML(FEU)
DATE LAST DOSE: ABNORMAL
DIAGNOSIS, 93000: NORMAL
DIFFERENTIAL METHOD BLD: ABNORMAL
ECHO AO ASC DIAM: 3.72 CM
ECHO AO ROOT DIAM: 3.23 CM
ECHO AV AREA PEAK VELOCITY: 2.8 CM2
ECHO AV AREA VTI: 3.4 CM2
ECHO AV AREA/BSA PEAK VELOCITY: 1.3 CM2/M2
ECHO AV AREA/BSA VTI: 1.5 CM2/M2
ECHO AV MEAN GRADIENT: 2.2 MMHG
ECHO AV MEAN VELOCITY: 0.67 M/S
ECHO AV PEAK GRADIENT: 4.3 MMHG
ECHO AV PEAK VELOCITY: 103.46 CM/S
ECHO AV VTI: 21.67 CM
ECHO IVC PROX: 1.64 CM
ECHO IVC SNIFF: 1.64 CM
ECHO LA MAJOR AXIS: 4 CM
ECHO LA MINOR AXIS: 1.8 CM
ECHO LA TO AORTIC ROOT RATIO: 1.24
ECHO LA VOL 2C: 80.62 ML (ref 18–58)
ECHO LA VOL 4C: 59.46 ML (ref 18–58)
ECHO LA VOL BP: 76.07 ML (ref 18–58)
ECHO LA VOL/BSA BIPLANE: 34.16 ML/M2 (ref 16–28)
ECHO LA VOLUME INDEX A2C: 36.2 ML/M2 (ref 16–28)
ECHO LA VOLUME INDEX A4C: 26.7 ML/M2 (ref 16–28)
ECHO LV E' LATERAL VELOCITY: 8 CM/S
ECHO LV E' SEPTAL VELOCITY: 6 CM/S
ECHO LV EDV A2C: 93.9 ML
ECHO LV EDV A4C: 74.4 ML
ECHO LV EDV BP: 87.9 ML (ref 67–155)
ECHO LV EDV INDEX A4C: 33.4 ML/M2
ECHO LV EDV INDEX BP: 39.5 ML/M2
ECHO LV EDV NDEX A2C: 42.2 ML/M2
ECHO LV EDV TEICHHOLZ: 0.51 ML
ECHO LV EJECTION FRACTION A2C: 43 %
ECHO LV EJECTION FRACTION A4C: 45 %
ECHO LV EJECTION FRACTION BIPLANE: 43 % (ref 55–100)
ECHO LV ESV A2C: 53.6 ML
ECHO LV ESV A4C: 41.1 ML
ECHO LV ESV BP: 50.1 ML (ref 22–58)
ECHO LV ESV INDEX A2C: 24.1 ML/M2
ECHO LV ESV INDEX A4C: 18.5 ML/M2
ECHO LV ESV INDEX BP: 22.5 ML/M2
ECHO LV ESV TEICHHOLZ: 0.28 ML
ECHO LV INTERNAL DIMENSION DIASTOLIC: 4.64 CM (ref 4.2–5.9)
ECHO LV INTERNAL DIMENSION SYSTOLIC: 3.62 CM
ECHO LV IVSD: 1.42 CM (ref 0.6–1)
ECHO LV MASS 2D: 246.5 G (ref 88–224)
ECHO LV MASS INDEX 2D: 110.7 G/M2 (ref 49–115)
ECHO LV POSTERIOR WALL DIASTOLIC: 1.28 CM (ref 0.6–1)
ECHO LVOT DIAM: 2.15 CM
ECHO LVOT PEAK GRADIENT: 2.5 MMHG
ECHO LVOT PEAK VELOCITY: 79.18 CM/S
ECHO LVOT SV: 72.8 ML
ECHO LVOT SV: 72.8 ML
ECHO LVOT VTI: 20.03 CM
ECHO MV E VELOCITY: 144 CM/S
ECHO MV E/E' LATERAL: 18
ECHO MV E/E' RATIO (AVERAGED): 21
ECHO MV E/E' SEPTAL: 24
ECHO MV MEAN INFLOW VELOCITY: 3.11 M/S
ECHO MV REGURGITANT PEAK GRADIENT: 56.9 MMHG
ECHO MV REGURGITANT PEAK VELOCITY: 377.26 CM/S
ECHO MV REGURGITANT VTIA: 103.38 CM
ECHO PV REGURGITANT MAX VELOCITY: 377.26 CM/S
ECHO RV INTERNAL DIMENSION: 3.79 CM
ECHO RV TAPSE: 2.4 CM (ref 1.5–2)
ECHO TV MEAN GRADIENT: 42.35 MMHG
EOSINOPHIL # BLD: 0 K/UL (ref 0–0.4)
EOSINOPHIL # BLD: 0 K/UL (ref 0–0.4)
EOSINOPHIL # BLD: 0.1 K/UL (ref 0–0.4)
EOSINOPHIL # BLD: 0.2 K/UL (ref 0–0.4)
EOSINOPHIL # BLD: 0.3 K/UL (ref 0–0.4)
EOSINOPHIL # BLD: 0.3 K/UL (ref 0–0.4)
EOSINOPHIL # BLD: 0.4 K/UL (ref 0–0.4)
EOSINOPHIL # BLD: 0.4 K/UL (ref 0–0.4)
EOSINOPHIL # BLD: 0.5 K/UL (ref 0–0.4)
EOSINOPHIL # BLD: 0.6 K/UL (ref 0–0.4)
EOSINOPHIL # BLD: 0.6 K/UL (ref 0–0.4)
EOSINOPHIL # BLD: 0.7 K/UL (ref 0–0.4)
EOSINOPHIL # BLD: 0.8 K/UL (ref 0–0.4)
EOSINOPHIL NFR BLD: 0 % (ref 0–5)
EOSINOPHIL NFR BLD: 0 % (ref 0–5)
EOSINOPHIL NFR BLD: 1 % (ref 0–5)
EOSINOPHIL NFR BLD: 2 % (ref 0–5)
EOSINOPHIL NFR BLD: 2 % (ref 0–5)
EOSINOPHIL NFR BLD: 3 % (ref 0–5)
EOSINOPHIL NFR BLD: 3 % (ref 0–5)
EOSINOPHIL NFR BLD: 4 % (ref 0–5)
EOSINOPHIL NFR BLD: 5 % (ref 0–5)
EOSINOPHIL NFR BLD: 5 % (ref 0–5)
EOSINOPHIL NFR BLD: 6 % (ref 0–5)
EOSINOPHIL NFR BLD: 7 % (ref 0–5)
EPITH CASTS URNS QL MICRO: ABNORMAL /LPF (ref 0–5)
ERYTHROCYTE [DISTWIDTH] IN BLOOD BY AUTOMATED COUNT: 17.4 % (ref 11.6–14.5)
ERYTHROCYTE [DISTWIDTH] IN BLOOD BY AUTOMATED COUNT: 17.7 % (ref 11.6–14.5)
ERYTHROCYTE [DISTWIDTH] IN BLOOD BY AUTOMATED COUNT: 17.7 % (ref 11.6–14.5)
ERYTHROCYTE [DISTWIDTH] IN BLOOD BY AUTOMATED COUNT: 18 % (ref 11.6–14.5)
ERYTHROCYTE [DISTWIDTH] IN BLOOD BY AUTOMATED COUNT: 18.1 % (ref 11.6–14.5)
ERYTHROCYTE [DISTWIDTH] IN BLOOD BY AUTOMATED COUNT: 18.2 % (ref 11.6–14.5)
ERYTHROCYTE [DISTWIDTH] IN BLOOD BY AUTOMATED COUNT: 18.5 % (ref 11.6–14.5)
ERYTHROCYTE [DISTWIDTH] IN BLOOD BY AUTOMATED COUNT: 19.1 % (ref 11.6–14.5)
ERYTHROCYTE [DISTWIDTH] IN BLOOD BY AUTOMATED COUNT: 19.1 % (ref 11.6–14.5)
ERYTHROCYTE [DISTWIDTH] IN BLOOD BY AUTOMATED COUNT: 19.2 % (ref 11.6–14.5)
ERYTHROCYTE [DISTWIDTH] IN BLOOD BY AUTOMATED COUNT: 19.3 % (ref 11.6–14.5)
ERYTHROCYTE [DISTWIDTH] IN BLOOD BY AUTOMATED COUNT: 19.4 % (ref 11.6–14.5)
ERYTHROCYTE [DISTWIDTH] IN BLOOD BY AUTOMATED COUNT: 19.5 % (ref 11.6–14.5)
ERYTHROCYTE [DISTWIDTH] IN BLOOD BY AUTOMATED COUNT: 19.6 % (ref 11.6–14.5)
ERYTHROCYTE [DISTWIDTH] IN BLOOD BY AUTOMATED COUNT: 20 % (ref 11.6–14.5)
ERYTHROCYTE [DISTWIDTH] IN BLOOD BY AUTOMATED COUNT: 20.2 % (ref 11.6–14.5)
ERYTHROCYTE [DISTWIDTH] IN BLOOD BY AUTOMATED COUNT: 20.2 % (ref 11.6–14.5)
ERYTHROCYTE [DISTWIDTH] IN BLOOD BY AUTOMATED COUNT: 21.6 % (ref 11.6–14.5)
ERYTHROCYTE [DISTWIDTH] IN BLOOD BY AUTOMATED COUNT: 21.7 % (ref 11.6–14.5)
ERYTHROCYTE [DISTWIDTH] IN BLOOD BY AUTOMATED COUNT: 22 % (ref 11.6–14.5)
EST. AVERAGE GLUCOSE BLD GHB EST-MCNC: 126 MG/DL
FIBRINOGEN PPP-MCNC: 530 MG/DL (ref 210–451)
GAS FLOW.O2 O2 DELIVERY SYS: ABNORMAL L/MIN
GAS FLOW.O2 SETTING OXYMISER: 15 L/M
GLOBULIN SER CALC-MCNC: 3.3 G/DL (ref 2–4)
GLOBULIN SER CALC-MCNC: 3.3 G/DL (ref 2–4)
GLOBULIN SER CALC-MCNC: 3.4 G/DL (ref 2–4)
GLOBULIN SER CALC-MCNC: 3.6 G/DL (ref 2–4)
GLOBULIN SER CALC-MCNC: 3.6 G/DL (ref 2–4)
GLOBULIN SER CALC-MCNC: 3.8 G/DL (ref 2–4)
GLOBULIN SER CALC-MCNC: 3.9 G/DL (ref 2–4)
GLOBULIN SER CALC-MCNC: 4.1 G/DL (ref 2–4)
GLOBULIN SER CALC-MCNC: 4.1 G/DL (ref 2–4)
GLUCOSE BLD STRIP.AUTO-MCNC: 100 MG/DL (ref 70–110)
GLUCOSE BLD STRIP.AUTO-MCNC: 101 MG/DL (ref 70–110)
GLUCOSE BLD STRIP.AUTO-MCNC: 103 MG/DL (ref 70–110)
GLUCOSE BLD STRIP.AUTO-MCNC: 104 MG/DL (ref 70–110)
GLUCOSE BLD STRIP.AUTO-MCNC: 104 MG/DL (ref 70–110)
GLUCOSE BLD STRIP.AUTO-MCNC: 105 MG/DL (ref 70–110)
GLUCOSE BLD STRIP.AUTO-MCNC: 106 MG/DL (ref 70–110)
GLUCOSE BLD STRIP.AUTO-MCNC: 108 MG/DL (ref 70–110)
GLUCOSE BLD STRIP.AUTO-MCNC: 108 MG/DL (ref 70–110)
GLUCOSE BLD STRIP.AUTO-MCNC: 109 MG/DL (ref 70–110)
GLUCOSE BLD STRIP.AUTO-MCNC: 110 MG/DL (ref 70–110)
GLUCOSE BLD STRIP.AUTO-MCNC: 111 MG/DL (ref 70–110)
GLUCOSE BLD STRIP.AUTO-MCNC: 112 MG/DL (ref 70–110)
GLUCOSE BLD STRIP.AUTO-MCNC: 112 MG/DL (ref 70–110)
GLUCOSE BLD STRIP.AUTO-MCNC: 113 MG/DL (ref 70–110)
GLUCOSE BLD STRIP.AUTO-MCNC: 114 MG/DL (ref 70–110)
GLUCOSE BLD STRIP.AUTO-MCNC: 115 MG/DL (ref 70–110)
GLUCOSE BLD STRIP.AUTO-MCNC: 118 MG/DL (ref 70–110)
GLUCOSE BLD STRIP.AUTO-MCNC: 119 MG/DL (ref 70–110)
GLUCOSE BLD STRIP.AUTO-MCNC: 120 MG/DL (ref 70–110)
GLUCOSE BLD STRIP.AUTO-MCNC: 120 MG/DL (ref 70–110)
GLUCOSE BLD STRIP.AUTO-MCNC: 121 MG/DL (ref 70–110)
GLUCOSE BLD STRIP.AUTO-MCNC: 130 MG/DL (ref 70–110)
GLUCOSE BLD STRIP.AUTO-MCNC: 131 MG/DL (ref 70–110)
GLUCOSE BLD STRIP.AUTO-MCNC: 131 MG/DL (ref 70–110)
GLUCOSE BLD STRIP.AUTO-MCNC: 139 MG/DL (ref 70–110)
GLUCOSE BLD STRIP.AUTO-MCNC: 144 MG/DL (ref 70–110)
GLUCOSE BLD STRIP.AUTO-MCNC: 153 MG/DL (ref 70–110)
GLUCOSE BLD STRIP.AUTO-MCNC: 158 MG/DL (ref 70–110)
GLUCOSE BLD STRIP.AUTO-MCNC: 164 MG/DL (ref 70–110)
GLUCOSE BLD STRIP.AUTO-MCNC: 166 MG/DL (ref 70–110)
GLUCOSE BLD STRIP.AUTO-MCNC: 178 MG/DL (ref 70–110)
GLUCOSE BLD STRIP.AUTO-MCNC: 184 MG/DL (ref 70–110)
GLUCOSE BLD STRIP.AUTO-MCNC: 185 MG/DL (ref 70–110)
GLUCOSE BLD STRIP.AUTO-MCNC: 209 MG/DL (ref 70–110)
GLUCOSE BLD STRIP.AUTO-MCNC: 87 MG/DL (ref 70–110)
GLUCOSE BLD STRIP.AUTO-MCNC: 92 MG/DL (ref 70–110)
GLUCOSE BLD STRIP.AUTO-MCNC: 92 MG/DL (ref 70–110)
GLUCOSE BLD STRIP.AUTO-MCNC: 93 MG/DL (ref 70–110)
GLUCOSE BLD STRIP.AUTO-MCNC: 95 MG/DL (ref 70–110)
GLUCOSE BLD STRIP.AUTO-MCNC: 95 MG/DL (ref 70–110)
GLUCOSE BLD STRIP.AUTO-MCNC: 99 MG/DL (ref 70–110)
GLUCOSE SERPL-MCNC: 101 MG/DL (ref 74–99)
GLUCOSE SERPL-MCNC: 105 MG/DL (ref 74–99)
GLUCOSE SERPL-MCNC: 106 MG/DL (ref 74–99)
GLUCOSE SERPL-MCNC: 107 MG/DL (ref 74–99)
GLUCOSE SERPL-MCNC: 108 MG/DL (ref 74–99)
GLUCOSE SERPL-MCNC: 110 MG/DL (ref 74–99)
GLUCOSE SERPL-MCNC: 115 MG/DL (ref 74–99)
GLUCOSE SERPL-MCNC: 116 MG/DL (ref 74–99)
GLUCOSE SERPL-MCNC: 120 MG/DL (ref 74–99)
GLUCOSE SERPL-MCNC: 123 MG/DL (ref 74–99)
GLUCOSE SERPL-MCNC: 125 MG/DL (ref 74–99)
GLUCOSE SERPL-MCNC: 126 MG/DL (ref 74–99)
GLUCOSE SERPL-MCNC: 132 MG/DL (ref 74–99)
GLUCOSE SERPL-MCNC: 145 MG/DL (ref 74–99)
GLUCOSE SERPL-MCNC: 147 MG/DL (ref 74–99)
GLUCOSE SERPL-MCNC: 164 MG/DL (ref 74–99)
GLUCOSE SERPL-MCNC: 93 MG/DL (ref 74–99)
GLUCOSE SERPL-MCNC: 96 MG/DL (ref 74–99)
GLUCOSE SERPL-MCNC: 97 MG/DL (ref 74–99)
GLUCOSE SERPL-MCNC: 98 MG/DL (ref 74–99)
GLUCOSE SERPL-MCNC: 99 MG/DL (ref 74–99)
GLUCOSE SERPL-MCNC: 99 MG/DL (ref 74–99)
GLUCOSE UR STRIP.AUTO-MCNC: NEGATIVE MG/DL
GLUCOSE UR STRIP.AUTO-MCNC: NEGATIVE MG/DL
GRAM STN SPEC: ABNORMAL
GRAM STN SPEC: ABNORMAL
HAPTOGLOB SERPL-MCNC: 164 MG/DL (ref 30–200)
HBA1C MFR BLD: 6 % (ref 4.2–5.6)
HCO3 BLD-SCNC: 15 MMOL/L (ref 22–26)
HCT VFR BLD AUTO: 18.1 % (ref 36–48)
HCT VFR BLD AUTO: 22 % (ref 36–48)
HCT VFR BLD AUTO: 22.5 % (ref 36–48)
HCT VFR BLD AUTO: 22.6 % (ref 36–48)
HCT VFR BLD AUTO: 22.6 % (ref 36–48)
HCT VFR BLD AUTO: 22.8 % (ref 36–48)
HCT VFR BLD AUTO: 23.6 % (ref 36–48)
HCT VFR BLD AUTO: 23.7 % (ref 36–48)
HCT VFR BLD AUTO: 23.7 % (ref 36–48)
HCT VFR BLD AUTO: 25.9 % (ref 36–48)
HCT VFR BLD AUTO: 26.1 % (ref 36–48)
HCT VFR BLD AUTO: 26.7 % (ref 36–48)
HCT VFR BLD AUTO: 26.8 % (ref 36–48)
HCT VFR BLD AUTO: 26.8 % (ref 36–48)
HCT VFR BLD AUTO: 27.9 % (ref 36–48)
HCT VFR BLD AUTO: 28 % (ref 36–48)
HCT VFR BLD AUTO: 28.1 % (ref 36–48)
HCT VFR BLD AUTO: 28.2 % (ref 36–48)
HCT VFR BLD AUTO: 28.8 % (ref 36–48)
HCT VFR BLD AUTO: 28.8 % (ref 36–48)
HCT VFR BLD AUTO: 28.9 % (ref 36–48)
HCT VFR BLD AUTO: 29.9 % (ref 36–48)
HCT VFR BLD AUTO: 32.7 % (ref 36–48)
HEALTH STATUS, XMCV2T: NORMAL
HGB BLD-MCNC: 10.4 G/DL (ref 13–16)
HGB BLD-MCNC: 5.8 G/DL (ref 13–16)
HGB BLD-MCNC: 7 G/DL (ref 13–16)
HGB BLD-MCNC: 7 G/DL (ref 13–16)
HGB BLD-MCNC: 7.1 G/DL (ref 13–16)
HGB BLD-MCNC: 7.2 G/DL (ref 13–16)
HGB BLD-MCNC: 7.2 G/DL (ref 13–16)
HGB BLD-MCNC: 7.4 G/DL (ref 13–16)
HGB BLD-MCNC: 8.1 G/DL (ref 13–16)
HGB BLD-MCNC: 8.3 G/DL (ref 13–16)
HGB BLD-MCNC: 8.3 G/DL (ref 13–16)
HGB BLD-MCNC: 8.4 G/DL (ref 13–16)
HGB BLD-MCNC: 8.4 G/DL (ref 13–16)
HGB BLD-MCNC: 8.5 G/DL (ref 13–16)
HGB BLD-MCNC: 8.7 G/DL (ref 13–16)
HGB BLD-MCNC: 8.7 G/DL (ref 13–16)
HGB BLD-MCNC: 8.9 G/DL (ref 13–16)
HGB BLD-MCNC: 8.9 G/DL (ref 13–16)
HGB BLD-MCNC: 9 G/DL (ref 13–16)
HGB BLD-MCNC: 9 G/DL (ref 13–16)
HGB BLD-MCNC: 9.4 G/DL (ref 13–16)
HGB UR QL STRIP: ABNORMAL
HGB UR QL STRIP: NEGATIVE
HISTORY CHECKED?,CKHIST: NORMAL
HISTORY CHECKED?,CKHIST: NORMAL
INR PPP: 1.3 (ref 0.8–1.2)
INR PPP: 1.8 (ref 0.8–1.2)
KETONES UR QL STRIP.AUTO: NEGATIVE MG/DL
KETONES UR QL STRIP.AUTO: NEGATIVE MG/DL
LACTATE BLD-SCNC: 1.37 MMOL/L (ref 0.4–2)
LACTATE BLD-SCNC: 1.53 MMOL/L (ref 0.4–2)
LACTATE SERPL-SCNC: 2.5 MMOL/L (ref 0.4–2)
LEFT CFA DIST SYS PSV: 73 CM/S
LEFT CFA PROX SYS PSV: 109.3 CM/S
LEFT DIST ATA VELOCITY: 30 CM/S
LEFT DIST PTA PSV: 46.1 CM/S
LEFT POP A PROX VEL RATIO: 1
LEFT POPLITEAL DIST SYS PSV: 111.7 CM/S
LEFT POPLITEAL PROX SYS PSV: 85.9 CM/S
LEFT PROX PFA A PSV: 73 CM/S
LEFT SFA DIST VEL RATIO: 0.66
LEFT SFA MID VEL RATIO: 1.22
LEFT SFA PROX VEL RATIO: 0.98
LEFT SUPER FEMORAL DIST SYS PSV: 85.9 CM/S
LEFT SUPER FEMORAL MID SYS PSV: 129.8 CM/S
LEFT SUPER FEMORAL PROX SYS PSV: 106.6 CM/S
LEUKOCYTE ESTERASE UR QL STRIP.AUTO: ABNORMAL
LEUKOCYTE ESTERASE UR QL STRIP.AUTO: NEGATIVE
LVFS 2D: 21.99 %
LVOT MG: 1.63 MMHG
LVOT MV: 0.61 CM/S
LVSV (MOD BI): 16.16 ML
LVSV (MOD SINGLE 4C): 14.24 ML
LVSV (MOD SINGLE): 17.24 ML
LVSV (TEICH): 18.9 ML
LYMPHOCYTES # BLD: 0.7 K/UL (ref 0.9–3.6)
LYMPHOCYTES # BLD: 0.9 K/UL (ref 0.9–3.6)
LYMPHOCYTES # BLD: 1 K/UL (ref 0.9–3.6)
LYMPHOCYTES # BLD: 1.1 K/UL (ref 0.9–3.6)
LYMPHOCYTES # BLD: 1.2 K/UL (ref 0.9–3.6)
LYMPHOCYTES # BLD: 1.3 K/UL (ref 0.8–3.5)
LYMPHOCYTES # BLD: 1.3 K/UL (ref 0.9–3.6)
LYMPHOCYTES # BLD: 1.3 K/UL (ref 0.9–3.6)
LYMPHOCYTES # BLD: 1.4 K/UL (ref 0.9–3.6)
LYMPHOCYTES # BLD: 1.4 K/UL (ref 0.9–3.6)
LYMPHOCYTES # BLD: 1.7 K/UL (ref 0.9–3.6)
LYMPHOCYTES # BLD: 1.8 K/UL (ref 0.9–3.6)
LYMPHOCYTES # BLD: 1.8 K/UL (ref 0.9–3.6)
LYMPHOCYTES # BLD: 2.1 K/UL (ref 0.8–3.5)
LYMPHOCYTES NFR BLD: 10 % (ref 20–51)
LYMPHOCYTES NFR BLD: 10 % (ref 21–52)
LYMPHOCYTES NFR BLD: 11 % (ref 21–52)
LYMPHOCYTES NFR BLD: 11 % (ref 21–52)
LYMPHOCYTES NFR BLD: 12 % (ref 21–52)
LYMPHOCYTES NFR BLD: 13 % (ref 21–52)
LYMPHOCYTES NFR BLD: 14 % (ref 21–52)
LYMPHOCYTES NFR BLD: 15 % (ref 20–51)
LYMPHOCYTES NFR BLD: 15 % (ref 21–52)
LYMPHOCYTES NFR BLD: 15 % (ref 21–52)
LYMPHOCYTES NFR BLD: 5 % (ref 21–52)
LYMPHOCYTES NFR BLD: 7 % (ref 21–52)
LYMPHOCYTES NFR BLD: 7 % (ref 21–52)
LYMPHOCYTES NFR BLD: 8 % (ref 21–52)
LYMPHOCYTES NFR BLD: 8 % (ref 21–52)
LYMPHOCYTES NFR BLD: 9 % (ref 21–52)
MAGNESIUM SERPL-MCNC: 1.9 MG/DL (ref 1.6–2.6)
MAGNESIUM SERPL-MCNC: 1.9 MG/DL (ref 1.6–2.6)
MAGNESIUM SERPL-MCNC: 2 MG/DL (ref 1.6–2.6)
MAGNESIUM SERPL-MCNC: 2.1 MG/DL (ref 1.6–2.6)
MAGNESIUM SERPL-MCNC: 2.2 MG/DL (ref 1.6–2.6)
MAGNESIUM SERPL-MCNC: 2.5 MG/DL (ref 1.6–2.6)
MAGNESIUM SERPL-MCNC: 2.5 MG/DL (ref 1.6–2.6)
MAGNESIUM SERPL-MCNC: 2.6 MG/DL (ref 1.6–2.6)
MAGNESIUM SERPL-MCNC: 2.6 MG/DL (ref 1.6–2.6)
MAGNESIUM SERPL-MCNC: 2.7 MG/DL (ref 1.6–2.6)
MAGNESIUM SERPL-MCNC: 2.8 MG/DL (ref 1.6–2.6)
MAGNESIUM SERPL-MCNC: 3.1 MG/DL (ref 1.6–2.6)
MAGNESIUM SERPL-MCNC: 3.2 MG/DL (ref 1.6–2.6)
MCH RBC QN AUTO: 27.9 PG (ref 24–34)
MCH RBC QN AUTO: 28.6 PG (ref 24–34)
MCH RBC QN AUTO: 28.7 PG (ref 24–34)
MCH RBC QN AUTO: 28.8 PG (ref 24–34)
MCH RBC QN AUTO: 29 PG (ref 24–34)
MCH RBC QN AUTO: 29.2 PG (ref 24–34)
MCH RBC QN AUTO: 29.2 PG (ref 24–34)
MCH RBC QN AUTO: 29.3 PG (ref 24–34)
MCH RBC QN AUTO: 29.6 PG (ref 24–34)
MCH RBC QN AUTO: 29.6 PG (ref 24–34)
MCH RBC QN AUTO: 29.7 PG (ref 24–34)
MCH RBC QN AUTO: 30.1 PG (ref 24–34)
MCH RBC QN AUTO: 30.5 PG (ref 24–34)
MCH RBC QN AUTO: 30.6 PG (ref 24–34)
MCH RBC QN AUTO: 30.6 PG (ref 24–34)
MCH RBC QN AUTO: 30.7 PG (ref 24–34)
MCHC RBC AUTO-ENTMCNC: 30.1 G/DL (ref 31–37)
MCHC RBC AUTO-ENTMCNC: 30.4 G/DL (ref 31–37)
MCHC RBC AUTO-ENTMCNC: 30.5 G/DL (ref 31–37)
MCHC RBC AUTO-ENTMCNC: 30.7 G/DL (ref 31–37)
MCHC RBC AUTO-ENTMCNC: 30.8 G/DL (ref 31–37)
MCHC RBC AUTO-ENTMCNC: 30.9 G/DL (ref 31–37)
MCHC RBC AUTO-ENTMCNC: 31 G/DL (ref 31–37)
MCHC RBC AUTO-ENTMCNC: 31.1 G/DL (ref 31–37)
MCHC RBC AUTO-ENTMCNC: 31.2 G/DL (ref 31–37)
MCHC RBC AUTO-ENTMCNC: 31.3 G/DL (ref 31–37)
MCHC RBC AUTO-ENTMCNC: 31.3 G/DL (ref 31–37)
MCHC RBC AUTO-ENTMCNC: 31.4 G/DL (ref 31–37)
MCHC RBC AUTO-ENTMCNC: 31.4 G/DL (ref 31–37)
MCHC RBC AUTO-ENTMCNC: 31.5 G/DL (ref 31–37)
MCHC RBC AUTO-ENTMCNC: 31.7 G/DL (ref 31–37)
MCHC RBC AUTO-ENTMCNC: 31.8 G/DL (ref 31–37)
MCHC RBC AUTO-ENTMCNC: 31.8 G/DL (ref 31–37)
MCHC RBC AUTO-ENTMCNC: 31.9 G/DL (ref 31–37)
MCHC RBC AUTO-ENTMCNC: 32 G/DL (ref 31–37)
MCHC RBC AUTO-ENTMCNC: 32.3 G/DL (ref 31–37)
MCV RBC AUTO: 90.8 FL (ref 74–97)
MCV RBC AUTO: 91.3 FL (ref 74–97)
MCV RBC AUTO: 91.5 FL (ref 74–97)
MCV RBC AUTO: 91.8 FL (ref 74–97)
MCV RBC AUTO: 92.1 FL (ref 74–97)
MCV RBC AUTO: 92.7 FL (ref 74–97)
MCV RBC AUTO: 93 FL (ref 74–97)
MCV RBC AUTO: 93.1 FL (ref 74–97)
MCV RBC AUTO: 93.5 FL (ref 74–97)
MCV RBC AUTO: 94.3 FL (ref 74–97)
MCV RBC AUTO: 94.4 FL (ref 74–97)
MCV RBC AUTO: 94.7 FL (ref 74–97)
MCV RBC AUTO: 94.7 FL (ref 74–97)
MCV RBC AUTO: 94.8 FL (ref 74–97)
MCV RBC AUTO: 94.8 FL (ref 74–97)
MCV RBC AUTO: 95.8 FL (ref 74–97)
MCV RBC AUTO: 95.8 FL (ref 74–97)
MCV RBC AUTO: 96.2 FL (ref 74–97)
MCV RBC AUTO: 96.3 FL (ref 74–97)
MCV RBC AUTO: 96.6 FL (ref 74–97)
MCV RBC AUTO: 97 FL (ref 74–97)
MCV RBC AUTO: 98.2 FL (ref 74–97)
MONOCYTES # BLD: 0.6 K/UL (ref 0.05–1.2)
MONOCYTES # BLD: 0.6 K/UL (ref 0.05–1.2)
MONOCYTES # BLD: 0.6 K/UL (ref 0–1)
MONOCYTES # BLD: 0.7 K/UL (ref 0.05–1.2)
MONOCYTES # BLD: 0.8 K/UL (ref 0.05–1.2)
MONOCYTES # BLD: 0.8 K/UL (ref 0.05–1.2)
MONOCYTES # BLD: 1.2 K/UL (ref 0.05–1.2)
MONOCYTES # BLD: 1.2 K/UL (ref 0.05–1.2)
MONOCYTES # BLD: 1.2 K/UL (ref 0–1)
MONOCYTES # BLD: 1.3 K/UL (ref 0.05–1.2)
MONOCYTES # BLD: 1.4 K/UL (ref 0.05–1.2)
MONOCYTES # BLD: 1.5 K/UL (ref 0.05–1.2)
MONOCYTES # BLD: 1.5 K/UL (ref 0.05–1.2)
MONOCYTES NFR BLD: 10 % (ref 3–10)
MONOCYTES NFR BLD: 11 % (ref 3–10)
MONOCYTES NFR BLD: 11 % (ref 3–10)
MONOCYTES NFR BLD: 5 % (ref 2–9)
MONOCYTES NFR BLD: 6 % (ref 3–10)
MONOCYTES NFR BLD: 7 % (ref 3–10)
MONOCYTES NFR BLD: 8 % (ref 3–10)
MONOCYTES NFR BLD: 8 % (ref 3–10)
MONOCYTES NFR BLD: 9 % (ref 2–9)
MONOCYTES NFR BLD: 9 % (ref 3–10)
NEUTS SEG # BLD: 10.3 K/UL (ref 1.8–8)
NEUTS SEG # BLD: 10.6 K/UL (ref 1.8–8)
NEUTS SEG # BLD: 10.6 K/UL (ref 1.8–8)
NEUTS SEG # BLD: 10.8 K/UL (ref 1.8–8)
NEUTS SEG # BLD: 11 K/UL (ref 1.8–8)
NEUTS SEG # BLD: 11.7 K/UL (ref 1.8–8)
NEUTS SEG # BLD: 12 K/UL (ref 1.8–8)
NEUTS SEG # BLD: 12.2 K/UL (ref 1.8–8)
NEUTS SEG # BLD: 12.6 K/UL (ref 1.8–8)
NEUTS SEG # BLD: 12.6 K/UL (ref 1.8–8)
NEUTS SEG # BLD: 12.7 K/UL (ref 1.8–8)
NEUTS SEG # BLD: 12.8 K/UL (ref 1.8–8)
NEUTS SEG # BLD: 5.7 K/UL (ref 1.8–8)
NEUTS SEG # BLD: 6.7 K/UL (ref 1.8–8)
NEUTS SEG # BLD: 7.2 K/UL (ref 1.8–8)
NEUTS SEG # BLD: 7.4 K/UL (ref 1.8–8)
NEUTS SEG # BLD: 8 K/UL (ref 1.8–8)
NEUTS SEG # BLD: 9.4 K/UL (ref 1.8–8)
NEUTS SEG # BLD: 9.9 K/UL (ref 1.8–8)
NEUTS SEG NFR BLD: 71 % (ref 40–73)
NEUTS SEG NFR BLD: 72 % (ref 42–75)
NEUTS SEG NFR BLD: 73 % (ref 40–73)
NEUTS SEG NFR BLD: 74 % (ref 40–73)
NEUTS SEG NFR BLD: 75 % (ref 40–73)
NEUTS SEG NFR BLD: 75 % (ref 40–73)
NEUTS SEG NFR BLD: 76 % (ref 40–73)
NEUTS SEG NFR BLD: 77 % (ref 40–73)
NEUTS SEG NFR BLD: 78 % (ref 40–73)
NEUTS SEG NFR BLD: 79 % (ref 40–73)
NEUTS SEG NFR BLD: 82 % (ref 40–73)
NEUTS SEG NFR BLD: 83 % (ref 40–73)
NEUTS SEG NFR BLD: 84 % (ref 40–73)
NEUTS SEG NFR BLD: 84 % (ref 40–73)
NEUTS SEG NFR BLD: 84 % (ref 42–75)
NITRITE UR QL STRIP.AUTO: NEGATIVE
NITRITE UR QL STRIP.AUTO: NEGATIVE
O2/TOTAL GAS SETTING VFR VENT: 1 %
OTHER,OTHU: ABNORMAL
PCO2 BLD: 30.2 MMHG (ref 35–45)
PH BLD: 7.3 [PH] (ref 7.35–7.45)
PH UR STRIP: 5 [PH] (ref 5–8)
PH UR STRIP: 5.5 [PH] (ref 5–8)
PLATELET # BLD AUTO: 207 K/UL (ref 135–420)
PLATELET # BLD AUTO: 227 K/UL (ref 135–420)
PLATELET # BLD AUTO: 278 K/UL (ref 135–420)
PLATELET # BLD AUTO: 279 K/UL (ref 135–420)
PLATELET # BLD AUTO: 289 K/UL (ref 135–420)
PLATELET # BLD AUTO: 294 K/UL (ref 135–420)
PLATELET # BLD AUTO: 299 K/UL (ref 135–420)
PLATELET # BLD AUTO: 312 K/UL (ref 135–420)
PLATELET # BLD AUTO: 316 K/UL (ref 135–420)
PLATELET # BLD AUTO: 317 K/UL (ref 135–420)
PLATELET # BLD AUTO: 317 K/UL (ref 135–420)
PLATELET # BLD AUTO: 318 K/UL (ref 135–420)
PLATELET # BLD AUTO: 319 K/UL (ref 135–420)
PLATELET # BLD AUTO: 327 K/UL (ref 135–420)
PLATELET # BLD AUTO: 330 K/UL (ref 135–420)
PLATELET # BLD AUTO: 334 K/UL (ref 135–420)
PLATELET # BLD AUTO: 335 K/UL (ref 135–420)
PLATELET # BLD AUTO: 352 K/UL (ref 135–420)
PLATELET # BLD AUTO: 408 K/UL (ref 135–420)
PLATELET # BLD AUTO: 56 K/UL (ref 135–420)
PLATELET # BLD AUTO: 76 K/UL (ref 135–420)
PLATELET # BLD AUTO: 84 K/UL (ref 135–420)
PMV BLD AUTO: 7.5 FL (ref 9.2–11.8)
PMV BLD AUTO: 7.6 FL (ref 9.2–11.8)
PMV BLD AUTO: 7.7 FL (ref 9.2–11.8)
PMV BLD AUTO: 7.8 FL (ref 9.2–11.8)
PMV BLD AUTO: 8.1 FL (ref 9.2–11.8)
PMV BLD AUTO: 8.2 FL (ref 9.2–11.8)
PMV BLD AUTO: 8.3 FL (ref 9.2–11.8)
PMV BLD AUTO: 8.3 FL (ref 9.2–11.8)
PMV BLD AUTO: 8.4 FL (ref 9.2–11.8)
PMV BLD AUTO: 8.5 FL (ref 9.2–11.8)
PMV BLD AUTO: 8.5 FL (ref 9.2–11.8)
PMV BLD AUTO: 8.7 FL (ref 9.2–11.8)
PMV BLD AUTO: 8.8 FL (ref 9.2–11.8)
PMV BLD AUTO: 9.1 FL (ref 9.2–11.8)
PMV BLD AUTO: 9.5 FL (ref 9.2–11.8)
PMV BLD AUTO: 9.6 FL (ref 9.2–11.8)
PO2 BLD: 75 MMHG (ref 80–100)
POTASSIUM SERPL-SCNC: 3 MMOL/L (ref 3.5–5.5)
POTASSIUM SERPL-SCNC: 3.1 MMOL/L (ref 3.5–5.5)
POTASSIUM SERPL-SCNC: 3.2 MMOL/L (ref 3.5–5.5)
POTASSIUM SERPL-SCNC: 3.3 MMOL/L (ref 3.5–5.5)
POTASSIUM SERPL-SCNC: 3.4 MMOL/L (ref 3.5–5.5)
POTASSIUM SERPL-SCNC: 3.5 MMOL/L (ref 3.5–5.5)
POTASSIUM SERPL-SCNC: 3.6 MMOL/L (ref 3.5–5.5)
POTASSIUM SERPL-SCNC: 3.6 MMOL/L (ref 3.5–5.5)
POTASSIUM SERPL-SCNC: 3.7 MMOL/L (ref 3.5–5.5)
POTASSIUM SERPL-SCNC: 3.9 MMOL/L (ref 3.5–5.5)
POTASSIUM SERPL-SCNC: 4 MMOL/L (ref 3.5–5.5)
POTASSIUM SERPL-SCNC: 4.2 MMOL/L (ref 3.5–5.5)
POTASSIUM SERPL-SCNC: 4.4 MMOL/L (ref 3.5–5.5)
POTASSIUM SERPL-SCNC: 4.4 MMOL/L (ref 3.5–5.5)
PROT SERPL-MCNC: 5.5 G/DL (ref 6.4–8.2)
PROT SERPL-MCNC: 5.6 G/DL (ref 6.4–8.2)
PROT SERPL-MCNC: 5.9 G/DL (ref 6.4–8.2)
PROT SERPL-MCNC: 6 G/DL (ref 6.4–8.2)
PROT SERPL-MCNC: 6.1 G/DL (ref 6.4–8.2)
PROT SERPL-MCNC: 6.5 G/DL (ref 6.4–8.2)
PROT SERPL-MCNC: 6.6 G/DL (ref 6.4–8.2)
PROT SERPL-MCNC: 6.7 G/DL (ref 6.4–8.2)
PROT SERPL-MCNC: 6.7 G/DL (ref 6.4–8.2)
PROT UR STRIP-MCNC: 100 MG/DL
PROT UR STRIP-MCNC: NEGATIVE MG/DL
PROTHROMBIN TIME: 16 SEC (ref 11.5–15.2)
PROTHROMBIN TIME: 20.2 SEC (ref 11.5–15.2)
Q-T INTERVAL, ECG07: 378 MS
Q-T INTERVAL, ECG07: 396 MS
Q-T INTERVAL, ECG07: 466 MS
QRS DURATION, ECG06: 120 MS
QRS DURATION, ECG06: 130 MS
QRS DURATION, ECG06: 138 MS
QTC CALCULATION (BEZET), ECG08: 488 MS
QTC CALCULATION (BEZET), ECG08: 499 MS
QTC CALCULATION (BEZET), ECG08: 518 MS
RBC # BLD AUTO: 1.89 M/UL (ref 4.7–5.5)
RBC # BLD AUTO: 2.32 M/UL (ref 4.7–5.5)
RBC # BLD AUTO: 2.33 M/UL (ref 4.7–5.5)
RBC # BLD AUTO: 2.36 M/UL (ref 4.7–5.5)
RBC # BLD AUTO: 2.36 M/UL (ref 4.7–5.5)
RBC # BLD AUTO: 2.46 M/UL (ref 4.7–5.5)
RBC # BLD AUTO: 2.5 M/UL (ref 4.7–5.5)
RBC # BLD AUTO: 2.58 M/UL (ref 4.7–5.5)
RBC # BLD AUTO: 2.82 M/UL (ref 4.7–5.5)
RBC # BLD AUTO: 2.84 M/UL (ref 4.7–5.5)
RBC # BLD AUTO: 2.84 M/UL (ref 4.7–5.5)
RBC # BLD AUTO: 2.86 M/UL (ref 4.7–5.5)
RBC # BLD AUTO: 2.88 M/UL (ref 4.7–5.5)
RBC # BLD AUTO: 2.95 M/UL (ref 4.7–5.5)
RBC # BLD AUTO: 3.02 M/UL (ref 4.7–5.5)
RBC # BLD AUTO: 3.03 M/UL (ref 4.7–5.5)
RBC # BLD AUTO: 3.04 M/UL (ref 4.7–5.5)
RBC # BLD AUTO: 3.09 M/UL (ref 4.7–5.5)
RBC # BLD AUTO: 3.17 M/UL (ref 4.7–5.5)
RBC # BLD AUTO: 3.4 M/UL (ref 4.7–5.5)
RBC #/AREA URNS HPF: ABNORMAL /HPF (ref 0–5)
RBC MORPH BLD: ABNORMAL
REPORTED DOSE,DOSE: ABNORMAL UNITS
REPORTED DOSE/TIME,TMG: 51
REPORTED DOSE/TIME,TMG: 707
REPORTED DOSE/TIME,TMG: ABNORMAL
RIGHT CFA DIST SYS PSV: 96.5 CM/S
RIGHT CFA PROX SYS PSV: 119.7 CM/S
RIGHT DIST ATA VELOCITY: 78.2 CM/S
RIGHT DIST PTA PSV: 117.1 CM/S
RIGHT POP A PROX VEL RATIO: 0.57
RIGHT POPLITEAL DIST SYS PSV: 88.6 CM/S
RIGHT POPLITEAL PROX SYS PSV: 65.3 CM/S
RIGHT PROX PFA A PSV: 49.8 CM/S
RIGHT SFA DIST VEL RATIO: 0.83
RIGHT SFA MID VEL RATIO: 1.4
RIGHT SFA PROX VEL RATIO: 0.8
RIGHT SUPER FEMORAL DIST SYS PSV: 114.5 CM/S
RIGHT SUPER FEMORAL MID SYS PSV: 137.9 CM/S
RIGHT SUPER FEMORAL PROX SYS PSV: 99.1 CM/S
SAO2 % BLD: 94 % (ref 92–97)
SARS-COV-2, COV2: NORMAL
SARS-COV-2, COV2NT: NOT DETECTED
SERVICE CMNT-IMP: ABNORMAL
SERVICE CMNT-IMP: ABNORMAL
SERVICE CMNT-IMP: NORMAL
SODIUM SERPL-SCNC: 136 MMOL/L (ref 136–145)
SODIUM SERPL-SCNC: 136 MMOL/L (ref 136–145)
SODIUM SERPL-SCNC: 139 MMOL/L (ref 136–145)
SODIUM SERPL-SCNC: 139 MMOL/L (ref 136–145)
SODIUM SERPL-SCNC: 140 MMOL/L (ref 136–145)
SODIUM SERPL-SCNC: 140 MMOL/L (ref 136–145)
SODIUM SERPL-SCNC: 142 MMOL/L (ref 136–145)
SODIUM SERPL-SCNC: 142 MMOL/L (ref 136–145)
SODIUM SERPL-SCNC: 143 MMOL/L (ref 136–145)
SODIUM SERPL-SCNC: 144 MMOL/L (ref 136–145)
SODIUM SERPL-SCNC: 145 MMOL/L (ref 136–145)
SODIUM SERPL-SCNC: 149 MMOL/L (ref 136–145)
SODIUM SERPL-SCNC: 149 MMOL/L (ref 136–145)
SODIUM SERPL-SCNC: 150 MMOL/L (ref 136–145)
SODIUM SERPL-SCNC: 150 MMOL/L (ref 136–145)
SOURCE, COVRS: NORMAL
SP GR UR REFRACTOMETRY: 1.01 (ref 1–1.03)
SP GR UR REFRACTOMETRY: 1.01 (ref 1–1.03)
SPECIMEN EXP DATE BLD: NORMAL
SPECIMEN EXP DATE BLD: NORMAL
SPECIMEN TYPE, XMCV1T: NORMAL
SPECIMEN TYPE: ABNORMAL
STATUS OF UNIT,%ST: NORMAL
STATUS OF UNIT,%ST: NORMAL
TOTAL RESP. RATE, ITRR: 28
TROPONIN I SERPL-MCNC: 0.02 NG/ML (ref 0–0.04)
TROPONIN I SERPL-MCNC: 0.02 NG/ML (ref 0–0.04)
TROPONIN I SERPL-MCNC: 0.03 NG/ML (ref 0–0.04)
TROPONIN I SERPL-MCNC: <0.02 NG/ML (ref 0–0.04)
UNIT DIVISION, %UDIV: 0
UNIT DIVISION, %UDIV: 0
UROBILINOGEN UR QL STRIP.AUTO: 0.2 EU/DL (ref 0.2–1)
UROBILINOGEN UR QL STRIP.AUTO: 1 EU/DL (ref 0.2–1)
VANCOMYCIN SERPL-MCNC: 13.5 UG/ML (ref 5–40)
VANCOMYCIN SERPL-MCNC: 14.7 UG/ML (ref 5–40)
VANCOMYCIN SERPL-MCNC: 15.9 UG/ML (ref 5–40)
VANCOMYCIN SERPL-MCNC: 16.6 UG/ML (ref 5–40)
VANCOMYCIN SERPL-MCNC: 16.9 UG/ML (ref 5–40)
VANCOMYCIN SERPL-MCNC: 17.9 UG/ML (ref 5–40)
VANCOMYCIN SERPL-MCNC: 17.9 UG/ML (ref 5–40)
VANCOMYCIN SERPL-MCNC: 19 UG/ML (ref 5–40)
VANCOMYCIN SERPL-MCNC: 19.8 UG/ML (ref 5–40)
VANCOMYCIN SERPL-MCNC: 20.1 UG/ML (ref 5–40)
VANCOMYCIN SERPL-MCNC: 21.7 UG/ML (ref 5–40)
VANCOMYCIN TROUGH SERPL-MCNC: 13 UG/ML (ref 10–20)
VANCOMYCIN TROUGH SERPL-MCNC: 13.9 UG/ML (ref 10–20)
VANCOMYCIN TROUGH SERPL-MCNC: 15.6 UG/ML (ref 10–20)
VANCOMYCIN TROUGH SERPL-MCNC: 21.4 UG/ML (ref 10–20)
VANCOMYCIN TROUGH SERPL-MCNC: 22 UG/ML (ref 10–20)
VANCOMYCIN TROUGH SERPL-MCNC: 24 UG/ML (ref 10–20)
VANCOMYCIN TROUGH SERPL-MCNC: 27.3 UG/ML (ref 10–20)
VENTRICULAR RATE, ECG03: 103 BPM
VENTRICULAR RATE, ECG03: 105 BPM
VENTRICULAR RATE, ECG03: 66 BPM
WBC # BLD AUTO: 10.6 K/UL (ref 4.6–13.2)
WBC # BLD AUTO: 11.3 K/UL (ref 4.6–13.2)
WBC # BLD AUTO: 12.2 K/UL (ref 4.6–13.2)
WBC # BLD AUTO: 12.6 K/UL (ref 4.6–13.2)
WBC # BLD AUTO: 13 K/UL (ref 4.6–13.2)
WBC # BLD AUTO: 13.4 K/UL (ref 4.6–13.2)
WBC # BLD AUTO: 13.6 K/UL (ref 4.6–13.2)
WBC # BLD AUTO: 13.8 K/UL (ref 4.6–13.2)
WBC # BLD AUTO: 14 K/UL (ref 4.6–13.2)
WBC # BLD AUTO: 14.2 K/UL (ref 4.6–13.2)
WBC # BLD AUTO: 15 K/UL (ref 4.6–13.2)
WBC # BLD AUTO: 15.2 K/UL (ref 4.6–13.2)
WBC # BLD AUTO: 15.4 K/UL (ref 4.6–13.2)
WBC # BLD AUTO: 15.5 K/UL (ref 4.6–13.2)
WBC # BLD AUTO: 15.7 K/UL (ref 4.6–13.2)
WBC # BLD AUTO: 16 K/UL (ref 4.6–13.2)
WBC # BLD AUTO: 4.2 K/UL (ref 4.6–13.2)
WBC # BLD AUTO: 7.9 K/UL (ref 4.6–13.2)
WBC # BLD AUTO: 8.1 K/UL (ref 4.6–13.2)
WBC # BLD AUTO: 8.8 K/UL (ref 4.6–13.2)
WBC # BLD AUTO: 9.8 K/UL (ref 4.6–13.2)
WBC # BLD AUTO: 9.8 K/UL (ref 4.6–13.2)
WBC URNS QL MICRO: ABNORMAL /HPF (ref 0–5)

## 2021-01-01 PROCEDURE — 97530 THERAPEUTIC ACTIVITIES: CPT

## 2021-01-01 PROCEDURE — 85025 COMPLETE CBC W/AUTO DIFF WBC: CPT

## 2021-01-01 PROCEDURE — 83735 ASSAY OF MAGNESIUM: CPT

## 2021-01-01 PROCEDURE — 85730 THROMBOPLASTIN TIME PARTIAL: CPT

## 2021-01-01 PROCEDURE — 74011636637 HC RX REV CODE- 636/637: Performed by: HOSPITALIST

## 2021-01-01 PROCEDURE — 82140 ASSAY OF AMMONIA: CPT

## 2021-01-01 PROCEDURE — 36415 COLL VENOUS BLD VENIPUNCTURE: CPT

## 2021-01-01 PROCEDURE — 99285 EMERGENCY DEPT VISIT HI MDM: CPT

## 2021-01-01 PROCEDURE — 74011250636 HC RX REV CODE- 250/636: Performed by: HOSPITALIST

## 2021-01-01 PROCEDURE — 99211 OFF/OP EST MAY X REQ PHY/QHP: CPT

## 2021-01-01 PROCEDURE — 80048 BASIC METABOLIC PNL TOTAL CA: CPT

## 2021-01-01 PROCEDURE — 82550 ASSAY OF CK (CPK): CPT

## 2021-01-01 PROCEDURE — 76450000000

## 2021-01-01 PROCEDURE — 80202 ASSAY OF VANCOMYCIN: CPT

## 2021-01-01 PROCEDURE — 93005 ELECTROCARDIOGRAM TRACING: CPT

## 2021-01-01 PROCEDURE — 74011250637 HC RX REV CODE- 250/637: Performed by: INTERNAL MEDICINE

## 2021-01-01 PROCEDURE — 76210000006 HC OR PH I REC 0.5 TO 1 HR: Performed by: PODIATRIST

## 2021-01-01 PROCEDURE — 74011250637 HC RX REV CODE- 250/637: Performed by: HOSPITALIST

## 2021-01-01 PROCEDURE — 0JH63XZ INSERTION OF TUNNELED VASCULAR ACCESS DEVICE INTO CHEST SUBCUTANEOUS TISSUE AND FASCIA, PERCUTANEOUS APPROACH: ICD-10-PCS | Performed by: RADIOLOGY

## 2021-01-01 PROCEDURE — 65660000000 HC RM CCU STEPDOWN

## 2021-01-01 PROCEDURE — 0QBN0ZZ EXCISION OF RIGHT METATARSAL, OPEN APPROACH: ICD-10-PCS | Performed by: PODIATRIST

## 2021-01-01 PROCEDURE — 97110 THERAPEUTIC EXERCISES: CPT

## 2021-01-01 PROCEDURE — U0003 INFECTIOUS AGENT DETECTION BY NUCLEIC ACID (DNA OR RNA); SEVERE ACUTE RESPIRATORY SYNDROME CORONAVIRUS 2 (SARS-COV-2) (CORONAVIRUS DISEASE [COVID-19]), AMPLIFIED PROBE TECHNIQUE, MAKING USE OF HIGH THROUGHPUT TECHNOLOGIES AS DESCRIBED BY CMS-2020-01-R: HCPCS

## 2021-01-01 PROCEDURE — 77030031139 HC SUT VCRL2 J&J -A: Performed by: PODIATRIST

## 2021-01-01 PROCEDURE — 36430 TRANSFUSION BLD/BLD COMPNT: CPT

## 2021-01-01 PROCEDURE — 77030020782 HC GWN BAIR PAWS FLX 3M -B: Performed by: PODIATRIST

## 2021-01-01 PROCEDURE — 86140 C-REACTIVE PROTEIN: CPT

## 2021-01-01 PROCEDURE — 74011250636 HC RX REV CODE- 250/636: Performed by: INTERNAL MEDICINE

## 2021-01-01 PROCEDURE — 84520 ASSAY OF UREA NITROGEN: CPT

## 2021-01-01 PROCEDURE — 74011000258 HC RX REV CODE- 258: Performed by: INTERNAL MEDICINE

## 2021-01-01 PROCEDURE — 86901 BLOOD TYPING SEROLOGIC RH(D): CPT

## 2021-01-01 PROCEDURE — 80053 COMPREHEN METABOLIC PANEL: CPT

## 2021-01-01 PROCEDURE — 77010033678 HC OXYGEN DAILY

## 2021-01-01 PROCEDURE — 87040 BLOOD CULTURE FOR BACTERIA: CPT

## 2021-01-01 PROCEDURE — 74011250637 HC RX REV CODE- 250/637: Performed by: EMERGENCY MEDICINE

## 2021-01-01 PROCEDURE — 74011250636 HC RX REV CODE- 250/636: Performed by: PODIATRIST

## 2021-01-01 PROCEDURE — 84484 ASSAY OF TROPONIN QUANT: CPT

## 2021-01-01 PROCEDURE — 74011250636 HC RX REV CODE- 250/636: Performed by: RADIOLOGY

## 2021-01-01 PROCEDURE — 74011000250 HC RX REV CODE- 250: Performed by: EMERGENCY MEDICINE

## 2021-01-01 PROCEDURE — 88307 TISSUE EXAM BY PATHOLOGIST: CPT

## 2021-01-01 PROCEDURE — 83036 HEMOGLOBIN GLYCOSYLATED A1C: CPT

## 2021-01-01 PROCEDURE — 85027 COMPLETE CBC AUTOMATED: CPT

## 2021-01-01 PROCEDURE — 74011000250 HC RX REV CODE- 250: Performed by: RADIOLOGY

## 2021-01-01 PROCEDURE — 74011000250 HC RX REV CODE- 250: Performed by: STUDENT IN AN ORGANIZED HEALTH CARE EDUCATION/TRAINING PROGRAM

## 2021-01-01 PROCEDURE — 94660 CPAP INITIATION&MGMT: CPT

## 2021-01-01 PROCEDURE — 87075 CULTR BACTERIA EXCEPT BLOOD: CPT

## 2021-01-01 PROCEDURE — 0Y6M0Z9 DETACHMENT AT RIGHT FOOT, PARTIAL 1ST RAY, OPEN APPROACH: ICD-10-PCS | Performed by: PODIATRIST

## 2021-01-01 PROCEDURE — 81003 URINALYSIS AUTO W/O SCOPE: CPT

## 2021-01-01 PROCEDURE — 76060000032 HC ANESTHESIA 0.5 TO 1 HR: Performed by: PODIATRIST

## 2021-01-01 PROCEDURE — 93925 LOWER EXTREMITY STUDY: CPT

## 2021-01-01 PROCEDURE — 77030013708 HC HNDPC SUC IRR PULS STRY –B: Performed by: PODIATRIST

## 2021-01-01 PROCEDURE — 77030019905 HC CATH URETH INTMIT MDII -A

## 2021-01-01 PROCEDURE — 0Y6M0ZC DETACHMENT AT RIGHT FOOT, PARTIAL 3RD RAY, OPEN APPROACH: ICD-10-PCS | Performed by: PODIATRIST

## 2021-01-01 PROCEDURE — 93306 TTE W/DOPPLER COMPLETE: CPT

## 2021-01-01 PROCEDURE — 82962 GLUCOSE BLOOD TEST: CPT

## 2021-01-01 PROCEDURE — 0J9Q0ZZ DRAINAGE OF RIGHT FOOT SUBCUTANEOUS TISSUE AND FASCIA, OPEN APPROACH: ICD-10-PCS | Performed by: PODIATRIST

## 2021-01-01 PROCEDURE — 83880 ASSAY OF NATRIURETIC PEPTIDE: CPT

## 2021-01-01 PROCEDURE — 82803 BLOOD GASES ANY COMBINATION: CPT

## 2021-01-01 PROCEDURE — 74011000250 HC RX REV CODE- 250: Performed by: PODIATRIST

## 2021-01-01 PROCEDURE — 93970 EXTREMITY STUDY: CPT

## 2021-01-01 PROCEDURE — 99223 1ST HOSP IP/OBS HIGH 75: CPT | Performed by: NURSE PRACTITIONER

## 2021-01-01 PROCEDURE — 77030006822 HC BLD SAW SAG BRSM -B: Performed by: PODIATRIST

## 2021-01-01 PROCEDURE — 51702 INSERT TEMP BLADDER CATH: CPT

## 2021-01-01 PROCEDURE — 70450 CT HEAD/BRAIN W/O DYE: CPT

## 2021-01-01 PROCEDURE — 74011000250 HC RX REV CODE- 250: Performed by: HOSPITALIST

## 2021-01-01 PROCEDURE — 0HXMXZZ TRANSFER RIGHT FOOT SKIN, EXTERNAL APPROACH: ICD-10-PCS | Performed by: PODIATRIST

## 2021-01-01 PROCEDURE — 77030019895 HC PCKNG STRP IODO -A: Performed by: PODIATRIST

## 2021-01-01 PROCEDURE — 71045 X-RAY EXAM CHEST 1 VIEW: CPT

## 2021-01-01 PROCEDURE — 85384 FIBRINOGEN ACTIVITY: CPT

## 2021-01-01 PROCEDURE — 74011000258 HC RX REV CODE- 258: Performed by: HOSPITALIST

## 2021-01-01 PROCEDURE — 0JBQ0ZZ EXCISION OF RIGHT FOOT SUBCUTANEOUS TISSUE AND FASCIA, OPEN APPROACH: ICD-10-PCS | Performed by: PODIATRIST

## 2021-01-01 PROCEDURE — 88311 DECALCIFY TISSUE: CPT

## 2021-01-01 PROCEDURE — 74011250636 HC RX REV CODE- 250/636: Performed by: ANESTHESIOLOGY

## 2021-01-01 PROCEDURE — P9045 ALBUMIN (HUMAN), 5%, 250 ML: HCPCS | Performed by: EMERGENCY MEDICINE

## 2021-01-01 PROCEDURE — 87205 SMEAR GRAM STAIN: CPT

## 2021-01-01 PROCEDURE — 77030040361 HC SLV COMPR DVT MDII -B: Performed by: PODIATRIST

## 2021-01-01 PROCEDURE — 36600 WITHDRAWAL OF ARTERIAL BLOOD: CPT

## 2021-01-01 PROCEDURE — 77030040830 HC CATH URETH FOL MDII -A

## 2021-01-01 PROCEDURE — 87635 SARS-COV-2 COVID-19 AMP PRB: CPT

## 2021-01-01 PROCEDURE — 77030012508 HC MSK AIRWY LMA AMBU -A: Performed by: ANESTHESIOLOGY

## 2021-01-01 PROCEDURE — 82553 CREATINE MB FRACTION: CPT

## 2021-01-01 PROCEDURE — P9016 RBC LEUKOCYTES REDUCED: HCPCS

## 2021-01-01 PROCEDURE — 85610 PROTHROMBIN TIME: CPT

## 2021-01-01 PROCEDURE — 97164 PT RE-EVAL EST PLAN CARE: CPT

## 2021-01-01 PROCEDURE — 2709999900 HC NON-CHARGEABLE SUPPLY: Performed by: PODIATRIST

## 2021-01-01 PROCEDURE — 88304 TISSUE EXAM BY PATHOLOGIST: CPT

## 2021-01-01 PROCEDURE — 0Y6M0ZF DETACHMENT AT RIGHT FOOT, PARTIAL 5TH RAY, OPEN APPROACH: ICD-10-PCS | Performed by: PODIATRIST

## 2021-01-01 PROCEDURE — 71250 CT THORAX DX C-: CPT

## 2021-01-01 PROCEDURE — 96374 THER/PROPH/DIAG INJ IV PUSH: CPT

## 2021-01-01 PROCEDURE — 83605 ASSAY OF LACTIC ACID: CPT

## 2021-01-01 PROCEDURE — 77030006788 HC BLD SAW OSC STRY -B: Performed by: PODIATRIST

## 2021-01-01 PROCEDURE — 96365 THER/PROPH/DIAG IV INF INIT: CPT

## 2021-01-01 PROCEDURE — 77030002916 HC SUT ETHLN J&J -A: Performed by: PODIATRIST

## 2021-01-01 PROCEDURE — A4615 CANNULA NASAL: HCPCS

## 2021-01-01 PROCEDURE — 74011250636 HC RX REV CODE- 250/636: Performed by: EMERGENCY MEDICINE

## 2021-01-01 PROCEDURE — 65270000029 HC RM PRIVATE

## 2021-01-01 PROCEDURE — P9047 ALBUMIN (HUMAN), 25%, 50ML: HCPCS | Performed by: HOSPITALIST

## 2021-01-01 PROCEDURE — 86923 COMPATIBILITY TEST ELECTRIC: CPT

## 2021-01-01 PROCEDURE — 74011000250 HC RX REV CODE- 250: Performed by: ANESTHESIOLOGY

## 2021-01-01 PROCEDURE — 81001 URINALYSIS AUTO W/SCOPE: CPT

## 2021-01-01 PROCEDURE — 77030013079 HC BLNKT BAIR HGGR 3M -A: Performed by: ANESTHESIOLOGY

## 2021-01-01 PROCEDURE — 74011250636 HC RX REV CODE- 250/636: Performed by: FAMILY MEDICINE

## 2021-01-01 PROCEDURE — 83010 ASSAY OF HAPTOGLOBIN QUANT: CPT

## 2021-01-01 PROCEDURE — 85379 FIBRIN DEGRADATION QUANT: CPT

## 2021-01-01 PROCEDURE — 0Y6M0ZB DETACHMENT AT RIGHT FOOT, PARTIAL 2ND RAY, OPEN APPROACH: ICD-10-PCS | Performed by: PODIATRIST

## 2021-01-01 PROCEDURE — 77030002933 HC SUT MCRYL J&J -A: Performed by: PODIATRIST

## 2021-01-01 PROCEDURE — 77030000032 HC CUF TRNQT ZIMM -B: Performed by: PODIATRIST

## 2021-01-01 PROCEDURE — 85018 HEMOGLOBIN: CPT

## 2021-01-01 PROCEDURE — 80076 HEPATIC FUNCTION PANEL: CPT

## 2021-01-01 PROCEDURE — 82565 ASSAY OF CREATININE: CPT

## 2021-01-01 PROCEDURE — 0KBV0ZZ EXCISION OF RIGHT FOOT MUSCLE, OPEN APPROACH: ICD-10-PCS | Performed by: PODIATRIST

## 2021-01-01 PROCEDURE — 97163 PT EVAL HIGH COMPLEX 45 MIN: CPT

## 2021-01-01 PROCEDURE — 74011000250 HC RX REV CODE- 250

## 2021-01-01 PROCEDURE — 87077 CULTURE AEROBIC IDENTIFY: CPT

## 2021-01-01 PROCEDURE — 74011250636 HC RX REV CODE- 250/636: Performed by: STUDENT IN AN ORGANIZED HEALTH CARE EDUCATION/TRAINING PROGRAM

## 2021-01-01 PROCEDURE — 74011000258 HC RX REV CODE- 258: Performed by: FAMILY MEDICINE

## 2021-01-01 PROCEDURE — 02HV33Z INSERTION OF INFUSION DEVICE INTO SUPERIOR VENA CAVA, PERCUTANEOUS APPROACH: ICD-10-PCS | Performed by: RADIOLOGY

## 2021-01-01 PROCEDURE — 76937 US GUIDE VASCULAR ACCESS: CPT

## 2021-01-01 PROCEDURE — 73718 MRI LOWER EXTREMITY W/O DYE: CPT

## 2021-01-01 PROCEDURE — 77030020362 HC SOL INJ STRL H2O 1000ML BG LF

## 2021-01-01 PROCEDURE — 76010000138 HC OR TIME 0.5 TO 1 HR: Performed by: PODIATRIST

## 2021-01-01 PROCEDURE — 51798 US URINE CAPACITY MEASURE: CPT

## 2021-01-01 PROCEDURE — 77010033711 HC HIGH FLOW OXYGEN

## 2021-01-01 PROCEDURE — 74176 CT ABD & PELVIS W/O CONTRAST: CPT

## 2021-01-01 PROCEDURE — 77030014007 HC SPNG HEMSTAT J&J -B: Performed by: PODIATRIST

## 2021-01-01 PROCEDURE — 87186 SC STD MICRODIL/AGAR DIL: CPT

## 2021-01-01 PROCEDURE — 77030002986 HC SUT PROL J&J -A: Performed by: PODIATRIST

## 2021-01-01 PROCEDURE — 0Y6M0ZD DETACHMENT AT RIGHT FOOT, PARTIAL 4TH RAY, OPEN APPROACH: ICD-10-PCS | Performed by: PODIATRIST

## 2021-01-01 RX ORDER — VANCOMYCIN 2 GRAM/500 ML IN 0.9 % SODIUM CHLORIDE INTRAVENOUS
2000 ONCE
Status: DISCONTINUED | OUTPATIENT
Start: 2021-01-01 | End: 2021-01-01

## 2021-01-01 RX ORDER — ALBUMIN HUMAN 50 G/1000ML
12.5 SOLUTION INTRAVENOUS ONCE
Status: COMPLETED | OUTPATIENT
Start: 2021-01-01 | End: 2021-01-01

## 2021-01-01 RX ORDER — POTASSIUM CHLORIDE 7.45 MG/ML
10 INJECTION INTRAVENOUS
Status: COMPLETED | OUTPATIENT
Start: 2021-01-01 | End: 2021-01-01

## 2021-01-01 RX ORDER — ACETAMINOPHEN 650 MG/1
650 SUPPOSITORY RECTAL
Status: DISCONTINUED | OUTPATIENT
Start: 2021-01-01 | End: 2021-01-01 | Stop reason: HOSPADM

## 2021-01-01 RX ORDER — SODIUM CHLORIDE 0.9 % (FLUSH) 0.9 %
5-40 SYRINGE (ML) INJECTION EVERY 8 HOURS
Status: DISCONTINUED | OUTPATIENT
Start: 2021-01-01 | End: 2021-01-01 | Stop reason: HOSPADM

## 2021-01-01 RX ORDER — HYDROMORPHONE HYDROCHLORIDE 1 MG/ML
0.2 INJECTION, SOLUTION INTRAMUSCULAR; INTRAVENOUS; SUBCUTANEOUS AS NEEDED
Status: DISCONTINUED | OUTPATIENT
Start: 2021-01-01 | End: 2021-01-01 | Stop reason: HOSPADM

## 2021-01-01 RX ORDER — ACETAMINOPHEN 325 MG/1
975 TABLET ORAL
Qty: 20 TAB | Refills: 0 | Status: SHIPPED | OUTPATIENT
Start: 2021-01-01

## 2021-01-01 RX ORDER — LIDOCAINE HYDROCHLORIDE 20 MG/ML
INJECTION, SOLUTION EPIDURAL; INFILTRATION; INTRACAUDAL; PERINEURAL AS NEEDED
Status: DISCONTINUED | OUTPATIENT
Start: 2021-01-01 | End: 2021-01-01 | Stop reason: HOSPADM

## 2021-01-01 RX ORDER — DIPHENHYDRAMINE HYDROCHLORIDE 50 MG/ML
12.5 INJECTION, SOLUTION INTRAMUSCULAR; INTRAVENOUS
Status: DISCONTINUED | OUTPATIENT
Start: 2021-01-01 | End: 2021-01-01 | Stop reason: HOSPADM

## 2021-01-01 RX ORDER — HYDROMORPHONE HYDROCHLORIDE 1 MG/ML
0.5 INJECTION, SOLUTION INTRAMUSCULAR; INTRAVENOUS; SUBCUTANEOUS
Status: DISCONTINUED | OUTPATIENT
Start: 2021-01-01 | End: 2021-01-01 | Stop reason: HOSPADM

## 2021-01-01 RX ORDER — HYDRALAZINE HYDROCHLORIDE 25 MG/1
25 TABLET, FILM COATED ORAL
Status: DISCONTINUED | OUTPATIENT
Start: 2021-01-01 | End: 2021-01-01 | Stop reason: HOSPADM

## 2021-01-01 RX ORDER — HYDROMORPHONE HYDROCHLORIDE 1 MG/ML
1 INJECTION, SOLUTION INTRAMUSCULAR; INTRAVENOUS; SUBCUTANEOUS
Status: DISCONTINUED | OUTPATIENT
Start: 2021-01-01 | End: 2021-01-01 | Stop reason: HOSPADM

## 2021-01-01 RX ORDER — SODIUM CHLORIDE, SODIUM LACTATE, POTASSIUM CHLORIDE, CALCIUM CHLORIDE 600; 310; 30; 20 MG/100ML; MG/100ML; MG/100ML; MG/100ML
125 INJECTION, SOLUTION INTRAVENOUS CONTINUOUS
Status: DISCONTINUED | OUTPATIENT
Start: 2021-01-01 | End: 2021-01-01 | Stop reason: HOSPADM

## 2021-01-01 RX ORDER — FENTANYL CITRATE 50 UG/ML
25 INJECTION, SOLUTION INTRAMUSCULAR; INTRAVENOUS
Status: DISCONTINUED | OUTPATIENT
Start: 2021-01-01 | End: 2021-01-01 | Stop reason: HOSPADM

## 2021-01-01 RX ORDER — MAGNESIUM SULFATE 100 %
4 CRYSTALS MISCELLANEOUS AS NEEDED
Status: DISCONTINUED | OUTPATIENT
Start: 2021-01-01 | End: 2021-01-01 | Stop reason: SDUPTHER

## 2021-01-01 RX ORDER — PHENYLEPHRINE HCL IN 0.9% NACL 1 MG/10 ML
SYRINGE (ML) INTRAVENOUS AS NEEDED
Status: DISCONTINUED | OUTPATIENT
Start: 2021-01-01 | End: 2021-01-01 | Stop reason: HOSPADM

## 2021-01-01 RX ORDER — HEPARIN SODIUM 1000 [USP'U]/ML
80 INJECTION, SOLUTION INTRAVENOUS; SUBCUTANEOUS ONCE
Status: COMPLETED | OUTPATIENT
Start: 2021-01-01 | End: 2021-01-01

## 2021-01-01 RX ORDER — ACETAMINOPHEN 325 MG/1
975 TABLET ORAL
Status: COMPLETED | OUTPATIENT
Start: 2021-01-01 | End: 2021-01-01

## 2021-01-01 RX ORDER — PROMETHAZINE HYDROCHLORIDE 25 MG/1
12.5 TABLET ORAL
Status: DISCONTINUED | OUTPATIENT
Start: 2021-01-01 | End: 2021-01-01 | Stop reason: HOSPADM

## 2021-01-01 RX ORDER — LIDOCAINE HYDROCHLORIDE 20 MG/ML
JELLY TOPICAL
Status: COMPLETED
Start: 2021-01-01 | End: 2021-01-01

## 2021-01-01 RX ORDER — MAG HYDROX/ALUMINUM HYD/SIMETH 200-200-20
30 SUSPENSION, ORAL (FINAL DOSE FORM) ORAL
COMMUNITY

## 2021-01-01 RX ORDER — FUROSEMIDE 10 MG/ML
80 INJECTION INTRAMUSCULAR; INTRAVENOUS EVERY 12 HOURS
Status: COMPLETED | OUTPATIENT
Start: 2021-01-01 | End: 2021-01-01

## 2021-01-01 RX ORDER — FENTANYL CITRATE 50 UG/ML
INJECTION, SOLUTION INTRAMUSCULAR; INTRAVENOUS AS NEEDED
Status: DISCONTINUED | OUTPATIENT
Start: 2021-01-01 | End: 2021-01-01 | Stop reason: HOSPADM

## 2021-01-01 RX ORDER — ALBUMIN HUMAN 250 G/1000ML
25 SOLUTION INTRAVENOUS EVERY 6 HOURS
Status: COMPLETED | OUTPATIENT
Start: 2021-01-01 | End: 2021-01-01

## 2021-01-01 RX ORDER — MORPHINE SULFATE 20 MG/ML
10 SOLUTION ORAL
Status: DISCONTINUED | OUTPATIENT
Start: 2021-01-01 | End: 2021-01-01 | Stop reason: HOSPADM

## 2021-01-01 RX ORDER — LACTOBACILLUS RHAMNOSUS GG 10B CELL
CAPSULE ORAL
COMMUNITY

## 2021-01-01 RX ORDER — ACETAMINOPHEN 325 MG/1
650 TABLET ORAL
Status: DISCONTINUED | OUTPATIENT
Start: 2021-01-01 | End: 2021-01-01 | Stop reason: HOSPADM

## 2021-01-01 RX ORDER — POTASSIUM CHLORIDE 7.45 MG/ML
10 INJECTION INTRAVENOUS
Status: DISPENSED | OUTPATIENT
Start: 2021-01-01 | End: 2021-01-01

## 2021-01-01 RX ORDER — FUROSEMIDE 10 MG/ML
40 INJECTION INTRAMUSCULAR; INTRAVENOUS
Status: COMPLETED | OUTPATIENT
Start: 2021-01-01 | End: 2021-01-01

## 2021-01-01 RX ORDER — TRAMADOL HYDROCHLORIDE 50 MG/1
50 TABLET ORAL
Qty: 18 TAB | Refills: 0 | Status: SHIPPED | OUTPATIENT
Start: 2021-01-01 | End: 2021-01-01 | Stop reason: SDUPTHER

## 2021-01-01 RX ORDER — ONDANSETRON 2 MG/ML
INJECTION INTRAMUSCULAR; INTRAVENOUS AS NEEDED
Status: DISCONTINUED | OUTPATIENT
Start: 2021-01-01 | End: 2021-01-01 | Stop reason: HOSPADM

## 2021-01-01 RX ORDER — TRAMADOL HYDROCHLORIDE 50 MG/1
50 TABLET ORAL
Qty: 18 TAB | Refills: 0 | Status: SHIPPED | OUTPATIENT
Start: 2021-01-01 | End: 2021-01-01

## 2021-01-01 RX ORDER — HEPARIN SODIUM (PORCINE) LOCK FLUSH IV SOLN 100 UNIT/ML 100 UNIT/ML
500 SOLUTION INTRAVENOUS
Status: DISCONTINUED | OUTPATIENT
Start: 2021-01-01 | End: 2021-01-01 | Stop reason: HOSPADM

## 2021-01-01 RX ORDER — HEPARIN SODIUM 1000 [USP'U]/ML
40 INJECTION, SOLUTION INTRAVENOUS; SUBCUTANEOUS ONCE
Status: COMPLETED | OUTPATIENT
Start: 2021-01-01 | End: 2021-01-01

## 2021-01-01 RX ORDER — SODIUM CHLORIDE, SODIUM LACTATE, POTASSIUM CHLORIDE, CALCIUM CHLORIDE 600; 310; 30; 20 MG/100ML; MG/100ML; MG/100ML; MG/100ML
50 INJECTION, SOLUTION INTRAVENOUS CONTINUOUS
Status: DISCONTINUED | OUTPATIENT
Start: 2021-01-01 | End: 2021-01-01 | Stop reason: HOSPADM

## 2021-01-01 RX ORDER — POTASSIUM CHLORIDE 750 MG/1
10 TABLET, EXTENDED RELEASE ORAL 2 TIMES DAILY
Status: DISCONTINUED | OUTPATIENT
Start: 2021-01-01 | End: 2021-01-01 | Stop reason: HOSPADM

## 2021-01-01 RX ORDER — POTASSIUM CHLORIDE 20 MEQ/1
20 TABLET, EXTENDED RELEASE ORAL
Status: COMPLETED | OUTPATIENT
Start: 2021-01-01 | End: 2021-01-01

## 2021-01-01 RX ORDER — LATANOPROST 50 UG/ML
1 SOLUTION/ DROPS OPHTHALMIC EVERY EVENING
Status: DISCONTINUED | OUTPATIENT
Start: 2021-01-01 | End: 2021-01-01 | Stop reason: HOSPADM

## 2021-01-01 RX ORDER — AMLODIPINE BESYLATE 5 MG/1
5 TABLET ORAL DAILY
Status: DISCONTINUED | OUTPATIENT
Start: 2021-01-01 | End: 2021-01-01 | Stop reason: HOSPADM

## 2021-01-01 RX ORDER — LEVOFLOXACIN 5 MG/ML
500 INJECTION, SOLUTION INTRAVENOUS
Status: DISCONTINUED | OUTPATIENT
Start: 2021-01-01 | End: 2021-01-01

## 2021-01-01 RX ORDER — SODIUM CHLORIDE 0.9 % (FLUSH) 0.9 %
5-10 SYRINGE (ML) INJECTION AS NEEDED
Status: DISCONTINUED | OUTPATIENT
Start: 2021-01-01 | End: 2021-01-01 | Stop reason: HOSPADM

## 2021-01-01 RX ORDER — DEXTROSE MONOHYDRATE 50 MG/ML
50 INJECTION, SOLUTION INTRAVENOUS CONTINUOUS
Status: DISCONTINUED | OUTPATIENT
Start: 2021-01-01 | End: 2021-01-01

## 2021-01-01 RX ORDER — LOSARTAN POTASSIUM 25 MG/1
25 TABLET ORAL DAILY
Status: DISCONTINUED | OUTPATIENT
Start: 2021-01-01 | End: 2021-01-01

## 2021-01-01 RX ORDER — LIDOCAINE HYDROCHLORIDE 20 MG/ML
JELLY TOPICAL
Status: COMPLETED | OUTPATIENT
Start: 2021-01-01 | End: 2021-01-01

## 2021-01-01 RX ORDER — FACIAL-BODY WIPES
10 EACH TOPICAL
COMMUNITY

## 2021-01-01 RX ORDER — AMLODIPINE BESYLATE 5 MG/1
5 TABLET ORAL DAILY
Status: DISCONTINUED | OUTPATIENT
Start: 2021-01-01 | End: 2021-01-01

## 2021-01-01 RX ORDER — SODIUM CHLORIDE 9 MG/ML
250 INJECTION, SOLUTION INTRAVENOUS AS NEEDED
Status: DISCONTINUED | OUTPATIENT
Start: 2021-01-01 | End: 2021-01-01 | Stop reason: HOSPADM

## 2021-01-01 RX ORDER — VANCOMYCIN 2 GRAM/500 ML IN 0.9 % SODIUM CHLORIDE INTRAVENOUS
2000 ONCE
Status: COMPLETED | OUTPATIENT
Start: 2021-01-01 | End: 2021-01-01

## 2021-01-01 RX ORDER — ONDANSETRON 2 MG/ML
4 INJECTION INTRAMUSCULAR; INTRAVENOUS
Status: DISCONTINUED | OUTPATIENT
Start: 2021-01-01 | End: 2021-01-01 | Stop reason: HOSPADM

## 2021-01-01 RX ORDER — ACETAMINOPHEN 120 MG/1
650 SUPPOSITORY RECTAL
COMMUNITY

## 2021-01-01 RX ORDER — FLUMAZENIL 0.1 MG/ML
0.2 INJECTION INTRAVENOUS
Status: DISCONTINUED | OUTPATIENT
Start: 2021-01-01 | End: 2021-01-01 | Stop reason: HOSPADM

## 2021-01-01 RX ORDER — LIDOCAINE HYDROCHLORIDE 10 MG/ML
1-20 INJECTION INFILTRATION; PERINEURAL
Status: COMPLETED | OUTPATIENT
Start: 2021-01-01 | End: 2021-01-01

## 2021-01-01 RX ORDER — MORPHINE SULFATE 2 MG/ML
2 INJECTION, SOLUTION INTRAMUSCULAR; INTRAVENOUS
Status: DISCONTINUED | OUTPATIENT
Start: 2021-01-01 | End: 2021-01-01 | Stop reason: HOSPADM

## 2021-01-01 RX ORDER — SODIUM CHLORIDE 0.9 % (FLUSH) 0.9 %
5-40 SYRINGE (ML) INJECTION AS NEEDED
Status: DISCONTINUED | OUTPATIENT
Start: 2021-01-01 | End: 2021-01-01 | Stop reason: HOSPADM

## 2021-01-01 RX ORDER — NALBUPHINE HYDROCHLORIDE 10 MG/ML
5 INJECTION, SOLUTION INTRAMUSCULAR; INTRAVENOUS; SUBCUTANEOUS
Status: DISCONTINUED | OUTPATIENT
Start: 2021-01-01 | End: 2021-01-01 | Stop reason: HOSPADM

## 2021-01-01 RX ORDER — POTASSIUM CHLORIDE 20 MEQ/1
40 TABLET, EXTENDED RELEASE ORAL
Status: COMPLETED | OUTPATIENT
Start: 2021-01-01 | End: 2021-01-01

## 2021-01-01 RX ORDER — LORAZEPAM 2 MG/ML
0.5 INJECTION INTRAMUSCULAR
Status: DISCONTINUED | OUTPATIENT
Start: 2021-01-01 | End: 2021-01-01 | Stop reason: HOSPADM

## 2021-01-01 RX ORDER — FUROSEMIDE 10 MG/ML
80 INJECTION INTRAMUSCULAR; INTRAVENOUS EVERY 12 HOURS
Status: DISCONTINUED | OUTPATIENT
Start: 2021-01-01 | End: 2021-01-01 | Stop reason: SDUPTHER

## 2021-01-01 RX ORDER — POLYETHYLENE GLYCOL 3350 17 G/17G
17 POWDER, FOR SOLUTION ORAL DAILY PRN
Status: DISCONTINUED | OUTPATIENT
Start: 2021-01-01 | End: 2021-01-01 | Stop reason: HOSPADM

## 2021-01-01 RX ORDER — ACETAMINOPHEN 325 MG/1
975 TABLET ORAL
Qty: 20 TAB | Refills: 0 | Status: SHIPPED | OUTPATIENT
Start: 2021-01-01 | End: 2021-01-01 | Stop reason: SDUPTHER

## 2021-01-01 RX ORDER — PROPOFOL 10 MG/ML
INJECTION, EMULSION INTRAVENOUS AS NEEDED
Status: DISCONTINUED | OUTPATIENT
Start: 2021-01-01 | End: 2021-01-01 | Stop reason: HOSPADM

## 2021-01-01 RX ORDER — NOREPINEPHRINE BIT/0.9 % NACL 8 MG/250ML
.5-16 INFUSION BOTTLE (ML) INTRAVENOUS
Status: DISCONTINUED | OUTPATIENT
Start: 2021-01-01 | End: 2021-01-01

## 2021-01-01 RX ORDER — MAGNESIUM SULFATE 100 %
4 CRYSTALS MISCELLANEOUS AS NEEDED
Status: DISCONTINUED | OUTPATIENT
Start: 2021-01-01 | End: 2021-01-01 | Stop reason: HOSPADM

## 2021-01-01 RX ORDER — TRAMADOL HYDROCHLORIDE 50 MG/1
50 TABLET ORAL
COMMUNITY

## 2021-01-01 RX ORDER — HEPARIN SODIUM 10000 [USP'U]/100ML
18-36 INJECTION, SOLUTION INTRAVENOUS
Status: DISCONTINUED | OUTPATIENT
Start: 2021-01-01 | End: 2021-01-01

## 2021-01-01 RX ORDER — INSULIN LISPRO 100 [IU]/ML
INJECTION, SOLUTION INTRAVENOUS; SUBCUTANEOUS ONCE
Status: DISCONTINUED | OUTPATIENT
Start: 2021-01-01 | End: 2021-01-01 | Stop reason: HOSPADM

## 2021-01-01 RX ORDER — ATENOLOL 50 MG/1
50 TABLET ORAL EVERY EVENING
Status: DISCONTINUED | OUTPATIENT
Start: 2021-01-01 | End: 2021-01-01 | Stop reason: HOSPADM

## 2021-01-01 RX ORDER — SODIUM CHLORIDE 0.9 % (FLUSH) 0.9 %
5-40 SYRINGE (ML) INJECTION EVERY 8 HOURS
Status: DISCONTINUED | OUTPATIENT
Start: 2021-01-01 | End: 2021-01-01

## 2021-01-01 RX ORDER — AMLODIPINE BESYLATE 5 MG/1
5 TABLET ORAL DAILY
Qty: 30 TAB | Refills: 0 | Status: SHIPPED
Start: 2021-01-01

## 2021-01-01 RX ORDER — DOXYCYCLINE 100 MG/1
100 TABLET ORAL DAILY
Qty: 30 TAB | Refills: 1 | Status: SHIPPED | OUTPATIENT
Start: 2021-01-01

## 2021-01-01 RX ORDER — HEPARIN SODIUM 200 [USP'U]/100ML
500 INJECTION, SOLUTION INTRAVENOUS
Status: COMPLETED | OUTPATIENT
Start: 2021-01-01 | End: 2021-01-01

## 2021-01-01 RX ORDER — LATANOPROST 50 UG/ML
1 SOLUTION/ DROPS OPHTHALMIC
Status: DISCONTINUED | OUTPATIENT
Start: 2021-01-01 | End: 2021-01-01

## 2021-01-01 RX ORDER — VANCOMYCIN 1.75 GRAM/500 ML IN 0.9 % SODIUM CHLORIDE INTRAVENOUS
1750 EVERY 24 HOURS
Status: DISCONTINUED | OUTPATIENT
Start: 2021-01-01 | End: 2021-01-01

## 2021-01-01 RX ORDER — INSULIN LISPRO 100 [IU]/ML
INJECTION, SOLUTION INTRAVENOUS; SUBCUTANEOUS
Status: DISCONTINUED | OUTPATIENT
Start: 2021-01-01 | End: 2021-01-01 | Stop reason: HOSPADM

## 2021-01-01 RX ORDER — ADHESIVE BANDAGE
30 BANDAGE TOPICAL DAILY PRN
COMMUNITY

## 2021-01-01 RX ORDER — SODIUM CHLORIDE, SODIUM LACTATE, POTASSIUM CHLORIDE, CALCIUM CHLORIDE 600; 310; 30; 20 MG/100ML; MG/100ML; MG/100ML; MG/100ML
25 INJECTION, SOLUTION INTRAVENOUS CONTINUOUS
Status: DISCONTINUED | OUTPATIENT
Start: 2021-01-01 | End: 2021-01-01 | Stop reason: HOSPADM

## 2021-01-01 RX ORDER — NALOXONE HYDROCHLORIDE 0.4 MG/ML
0.04 INJECTION, SOLUTION INTRAMUSCULAR; INTRAVENOUS; SUBCUTANEOUS
Status: DISCONTINUED | OUTPATIENT
Start: 2021-01-01 | End: 2021-01-01 | Stop reason: HOSPADM

## 2021-01-01 RX ORDER — DEXTROSE MONOHYDRATE 100 MG/ML
125-250 INJECTION, SOLUTION INTRAVENOUS AS NEEDED
Status: DISCONTINUED | OUTPATIENT
Start: 2021-01-01 | End: 2021-01-01 | Stop reason: SDUPTHER

## 2021-01-01 RX ORDER — ONDANSETRON 4 MG/1
4 TABLET, FILM COATED ORAL
COMMUNITY

## 2021-01-01 RX ORDER — DEXTROSE MONOHYDRATE 100 MG/ML
125-250 INJECTION, SOLUTION INTRAVENOUS AS NEEDED
Status: DISCONTINUED | OUTPATIENT
Start: 2021-01-01 | End: 2021-01-01 | Stop reason: HOSPADM

## 2021-01-01 RX ORDER — FUROSEMIDE 20 MG/1
20 TABLET ORAL DAILY
Status: DISCONTINUED | OUTPATIENT
Start: 2021-01-01 | End: 2021-01-01 | Stop reason: HOSPADM

## 2021-01-01 RX ORDER — TRAMADOL HYDROCHLORIDE 50 MG/1
50 TABLET ORAL
Status: COMPLETED | OUTPATIENT
Start: 2021-01-01 | End: 2021-01-01

## 2021-01-01 RX ORDER — OXYCODONE AND ACETAMINOPHEN 5; 325 MG/1; MG/1
1 TABLET ORAL
Status: DISCONTINUED | OUTPATIENT
Start: 2021-01-01 | End: 2021-01-01 | Stop reason: HOSPADM

## 2021-01-01 RX ORDER — VANCOMYCIN HYDROCHLORIDE
1250 EVERY 24 HOURS
Status: DISCONTINUED | OUTPATIENT
Start: 2021-01-01 | End: 2021-01-01 | Stop reason: DRUGHIGH

## 2021-01-01 RX ADMIN — POTASSIUM CHLORIDE 10 MEQ: 10 INJECTION, SOLUTION INTRAVENOUS at 10:50

## 2021-01-01 RX ADMIN — PIPERACILLIN AND TAZOBACTAM 2.25 G: 2; .25 INJECTION, POWDER, LYOPHILIZED, FOR SOLUTION INTRAVENOUS at 12:22

## 2021-01-01 RX ADMIN — INSULIN LISPRO 2 UNITS: 100 INJECTION, SOLUTION INTRAVENOUS; SUBCUTANEOUS at 21:16

## 2021-01-01 RX ADMIN — POTASSIUM CHLORIDE 10 MEQ: 750 TABLET, EXTENDED RELEASE ORAL at 00:20

## 2021-01-01 RX ADMIN — HEPARIN SODIUM 11 UNITS/KG/HR: 10000 INJECTION, SOLUTION INTRAVENOUS at 01:01

## 2021-01-01 RX ADMIN — HEPARIN SODIUM 8270 UNITS: 1000 INJECTION INTRAVENOUS; SUBCUTANEOUS at 01:00

## 2021-01-01 RX ADMIN — PIPERACILLIN AND TAZOBACTAM 2.25 G: 2; .25 INJECTION, POWDER, LYOPHILIZED, FOR SOLUTION INTRAVENOUS at 07:05

## 2021-01-01 RX ADMIN — LIDOCAINE HYDROCHLORIDE: 20 JELLY TOPICAL at 04:00

## 2021-01-01 RX ADMIN — PIPERACILLIN AND TAZOBACTAM 3.38 G: 3; .375 INJECTION, POWDER, LYOPHILIZED, FOR SOLUTION INTRAVENOUS at 00:57

## 2021-01-01 RX ADMIN — POTASSIUM CHLORIDE 10 MEQ: 10 INJECTION, SOLUTION INTRAVENOUS at 01:40

## 2021-01-01 RX ADMIN — VANCOMYCIN HYDROCHLORIDE 2000 MG: 10 INJECTION, POWDER, LYOPHILIZED, FOR SOLUTION INTRAVENOUS at 01:03

## 2021-01-01 RX ADMIN — INSULIN LISPRO 2 UNITS: 100 INJECTION, SOLUTION INTRAVENOUS; SUBCUTANEOUS at 23:25

## 2021-01-01 RX ADMIN — POTASSIUM CHLORIDE 10 MEQ: 10 INJECTION, SOLUTION INTRAVENOUS at 03:25

## 2021-01-01 RX ADMIN — ATENOLOL 50 MG: 50 TABLET ORAL at 17:52

## 2021-01-01 RX ADMIN — PIPERACILLIN AND TAZOBACTAM 2.25 G: 2; .25 INJECTION, POWDER, LYOPHILIZED, FOR SOLUTION INTRAVENOUS at 02:48

## 2021-01-01 RX ADMIN — ATENOLOL 50 MG: 50 TABLET ORAL at 17:53

## 2021-01-01 RX ADMIN — Medication 6 MCG/MIN: at 04:23

## 2021-01-01 RX ADMIN — POTASSIUM CHLORIDE 10 MEQ: 750 TABLET, EXTENDED RELEASE ORAL at 09:04

## 2021-01-01 RX ADMIN — AMLODIPINE BESYLATE 5 MG: 5 TABLET ORAL at 09:02

## 2021-01-01 RX ADMIN — ALBUMIN (HUMAN) 25 G: 0.25 INJECTION, SOLUTION INTRAVENOUS at 00:57

## 2021-01-01 RX ADMIN — AMLODIPINE BESYLATE 5 MG: 5 TABLET ORAL at 09:19

## 2021-01-01 RX ADMIN — APIXABAN 2.5 MG: 2.5 TABLET, FILM COATED ORAL at 21:39

## 2021-01-01 RX ADMIN — ATENOLOL 50 MG: 50 TABLET ORAL at 17:59

## 2021-01-01 RX ADMIN — PIPERACILLIN AND TAZOBACTAM 2.25 G: 2; .25 INJECTION, POWDER, LYOPHILIZED, FOR SOLUTION INTRAVENOUS at 15:08

## 2021-01-01 RX ADMIN — FUROSEMIDE 80 MG: 10 INJECTION, SOLUTION INTRAMUSCULAR; INTRAVENOUS at 21:39

## 2021-01-01 RX ADMIN — PIPERACILLIN AND TAZOBACTAM 2.25 G: 2; .25 INJECTION, POWDER, LYOPHILIZED, FOR SOLUTION INTRAVENOUS at 18:41

## 2021-01-01 RX ADMIN — Medication 10 ML: at 15:09

## 2021-01-01 RX ADMIN — PIPERACILLIN AND TAZOBACTAM 2.25 G: 2; .25 INJECTION, POWDER, LYOPHILIZED, FOR SOLUTION INTRAVENOUS at 22:42

## 2021-01-01 RX ADMIN — APIXABAN 2.5 MG: 2.5 TABLET, FILM COATED ORAL at 09:53

## 2021-01-01 RX ADMIN — POTASSIUM CHLORIDE 40 MEQ: 1500 TABLET, EXTENDED RELEASE ORAL at 15:08

## 2021-01-01 RX ADMIN — Medication 10 ML: at 22:00

## 2021-01-01 RX ADMIN — LOSARTAN POTASSIUM 25 MG: 25 TABLET, FILM COATED ORAL at 09:04

## 2021-01-01 RX ADMIN — ACETAMINOPHEN 650 MG: 325 TABLET ORAL at 21:32

## 2021-01-01 RX ADMIN — LATANOPROST 1 DROP: 50 SOLUTION OPHTHALMIC at 18:43

## 2021-01-01 RX ADMIN — FUROSEMIDE 40 MG: 10 INJECTION, SOLUTION INTRAMUSCULAR; INTRAVENOUS at 01:42

## 2021-01-01 RX ADMIN — Medication 10 ML: at 23:31

## 2021-01-01 RX ADMIN — PIPERACILLIN AND TAZOBACTAM 2.25 G: 2; .25 INJECTION, POWDER, LYOPHILIZED, FOR SOLUTION INTRAVENOUS at 09:53

## 2021-01-01 RX ADMIN — Medication 10 ML: at 22:44

## 2021-01-01 RX ADMIN — ALBUMIN (HUMAN) 25 G: 0.25 INJECTION, SOLUTION INTRAVENOUS at 05:20

## 2021-01-01 RX ADMIN — LIDOCAINE HYDROCHLORIDE: 20 JELLY TOPICAL at 03:00

## 2021-01-01 RX ADMIN — ACETAMINOPHEN 650 MG: 325 TABLET ORAL at 00:19

## 2021-01-01 RX ADMIN — ONDANSETRON HYDROCHLORIDE 4 MG: 2 INJECTION INTRAMUSCULAR; INTRAVENOUS at 15:24

## 2021-01-01 RX ADMIN — AMLODIPINE BESYLATE 5 MG: 5 TABLET ORAL at 09:04

## 2021-01-01 RX ADMIN — HEPARIN SODIUM 18 UNITS/KG/HR: 10000 INJECTION, SOLUTION INTRAVENOUS at 01:33

## 2021-01-01 RX ADMIN — APIXABAN 2.5 MG: 2.5 TABLET, FILM COATED ORAL at 09:01

## 2021-01-01 RX ADMIN — FUROSEMIDE 20 MG: 20 TABLET ORAL at 09:04

## 2021-01-01 RX ADMIN — Medication 5 ML: at 06:00

## 2021-01-01 RX ADMIN — ACETAMINOPHEN 650 MG: 325 TABLET ORAL at 15:29

## 2021-01-01 RX ADMIN — Medication 10 ML: at 18:00

## 2021-01-01 RX ADMIN — INSULIN LISPRO 2 UNITS: 100 INJECTION, SOLUTION INTRAVENOUS; SUBCUTANEOUS at 17:53

## 2021-01-01 RX ADMIN — AMLODIPINE BESYLATE 5 MG: 5 TABLET ORAL at 09:51

## 2021-01-01 RX ADMIN — Medication 10 ML: at 14:00

## 2021-01-01 RX ADMIN — HEPARIN SODIUM IN SODIUM CHLORIDE 1000 UNITS: 200 INJECTION INTRAVENOUS at 12:47

## 2021-01-01 RX ADMIN — ATENOLOL 50 MG: 50 TABLET ORAL at 18:33

## 2021-01-01 RX ADMIN — FENTANYL CITRATE 25 MCG: 50 INJECTION, SOLUTION INTRAMUSCULAR; INTRAVENOUS at 16:21

## 2021-01-01 RX ADMIN — POTASSIUM CHLORIDE 10 MEQ: 10 INJECTION, SOLUTION INTRAVENOUS at 05:21

## 2021-01-01 RX ADMIN — TRAMADOL HYDROCHLORIDE 50 MG: 50 TABLET, COATED ORAL at 11:08

## 2021-01-01 RX ADMIN — LATANOPROST 1 DROP: 50 SOLUTION OPHTHALMIC at 18:00

## 2021-01-01 RX ADMIN — FUROSEMIDE 80 MG: 10 INJECTION, SOLUTION INTRAMUSCULAR; INTRAVENOUS at 08:30

## 2021-01-01 RX ADMIN — HYDRALAZINE HYDROCHLORIDE 25 MG: 25 TABLET, FILM COATED ORAL at 12:42

## 2021-01-01 RX ADMIN — ATENOLOL 50 MG: 50 TABLET ORAL at 17:44

## 2021-01-01 RX ADMIN — POTASSIUM CHLORIDE 10 MEQ: 750 TABLET, EXTENDED RELEASE ORAL at 21:39

## 2021-01-01 RX ADMIN — PIPERACILLIN AND TAZOBACTAM 3.38 G: 3; .375 INJECTION, POWDER, LYOPHILIZED, FOR SOLUTION INTRAVENOUS at 01:45

## 2021-01-01 RX ADMIN — PIPERACILLIN AND TAZOBACTAM 2.25 G: 2; .25 INJECTION, POWDER, LYOPHILIZED, FOR SOLUTION INTRAVENOUS at 15:13

## 2021-01-01 RX ADMIN — PIPERACILLIN AND TAZOBACTAM 2.25 G: 2; .25 INJECTION, POWDER, LYOPHILIZED, FOR SOLUTION INTRAVENOUS at 23:13

## 2021-01-01 RX ADMIN — Medication 10 ML: at 06:10

## 2021-01-01 RX ADMIN — SODIUM CHLORIDE, SODIUM LACTATE, POTASSIUM CHLORIDE, AND CALCIUM CHLORIDE 125 ML/HR: 600; 310; 30; 20 INJECTION, SOLUTION INTRAVENOUS at 14:16

## 2021-01-01 RX ADMIN — LATANOPROST 1 DROP: 50 SOLUTION OPHTHALMIC at 17:00

## 2021-01-01 RX ADMIN — INSULIN LISPRO 4 UNITS: 100 INJECTION, SOLUTION INTRAVENOUS; SUBCUTANEOUS at 13:39

## 2021-01-01 RX ADMIN — POTASSIUM CHLORIDE 10 MEQ: 750 TABLET, EXTENDED RELEASE ORAL at 21:28

## 2021-01-01 RX ADMIN — Medication 10 ML: at 06:00

## 2021-01-01 RX ADMIN — POTASSIUM CHLORIDE 10 MEQ: 10 INJECTION, SOLUTION INTRAVENOUS at 11:26

## 2021-01-01 RX ADMIN — PIPERACILLIN AND TAZOBACTAM 2.25 G: 2; .25 INJECTION, POWDER, LYOPHILIZED, FOR SOLUTION INTRAVENOUS at 15:30

## 2021-01-01 RX ADMIN — PIPERACILLIN AND TAZOBACTAM 2.25 G: 2; .25 INJECTION, POWDER, LYOPHILIZED, FOR SOLUTION INTRAVENOUS at 03:02

## 2021-01-01 RX ADMIN — ATENOLOL 50 MG: 50 TABLET ORAL at 17:00

## 2021-01-01 RX ADMIN — Medication 20 ML: at 14:00

## 2021-01-01 RX ADMIN — HYDRALAZINE HYDROCHLORIDE 25 MG: 25 TABLET, FILM COATED ORAL at 23:12

## 2021-01-01 RX ADMIN — POTASSIUM CHLORIDE 20 MEQ: 1500 TABLET, EXTENDED RELEASE ORAL at 12:53

## 2021-01-01 RX ADMIN — ALBUMIN (HUMAN) 25 G: 0.25 INJECTION, SOLUTION INTRAVENOUS at 13:46

## 2021-01-01 RX ADMIN — HEPARIN SODIUM 9 UNITS/KG/HR: 10000 INJECTION, SOLUTION INTRAVENOUS at 19:25

## 2021-01-01 RX ADMIN — PIPERACILLIN AND TAZOBACTAM 3.38 G: 3; .375 INJECTION, POWDER, LYOPHILIZED, FOR SOLUTION INTRAVENOUS at 13:53

## 2021-01-01 RX ADMIN — PIPERACILLIN AND TAZOBACTAM 2.25 G: 2; .25 INJECTION, POWDER, LYOPHILIZED, FOR SOLUTION INTRAVENOUS at 00:47

## 2021-01-01 RX ADMIN — PIPERACILLIN AND TAZOBACTAM 2.25 G: 2; .25 INJECTION, POWDER, LYOPHILIZED, FOR SOLUTION INTRAVENOUS at 09:03

## 2021-01-01 RX ADMIN — APIXABAN 2.5 MG: 2.5 TABLET, FILM COATED ORAL at 08:30

## 2021-01-01 RX ADMIN — ATENOLOL 50 MG: 50 TABLET ORAL at 18:43

## 2021-01-01 RX ADMIN — FENTANYL CITRATE 50 MCG: 50 INJECTION, SOLUTION INTRAMUSCULAR; INTRAVENOUS at 15:09

## 2021-01-01 RX ADMIN — PIPERACILLIN AND TAZOBACTAM 3.38 G: 3; .375 INJECTION, POWDER, LYOPHILIZED, FOR SOLUTION INTRAVENOUS at 06:28

## 2021-01-01 RX ADMIN — PIPERACILLIN AND TAZOBACTAM 2.25 G: 2; .25 INJECTION, POWDER, LYOPHILIZED, FOR SOLUTION INTRAVENOUS at 12:52

## 2021-01-01 RX ADMIN — Medication 10 ML: at 07:09

## 2021-01-01 RX ADMIN — APIXABAN 2.5 MG: 2.5 TABLET, FILM COATED ORAL at 21:28

## 2021-01-01 RX ADMIN — LIDOCAINE HYDROCHLORIDE 60 MG: 20 INJECTION, SOLUTION EPIDURAL; INFILTRATION; INTRACAUDAL; PERINEURAL at 16:07

## 2021-01-01 RX ADMIN — PROPOFOL 120 MG: 10 INJECTION, EMULSION INTRAVENOUS at 15:09

## 2021-01-01 RX ADMIN — POTASSIUM CHLORIDE 10 MEQ: 10 INJECTION, SOLUTION INTRAVENOUS at 03:22

## 2021-01-01 RX ADMIN — FENTANYL CITRATE 50 MCG: 50 INJECTION, SOLUTION INTRAMUSCULAR; INTRAVENOUS at 16:03

## 2021-01-01 RX ADMIN — SODIUM CHLORIDE: 900 INJECTION, SOLUTION INTRAVENOUS at 15:59

## 2021-01-01 RX ADMIN — ACETAMINOPHEN 975 MG: 325 TABLET ORAL at 11:08

## 2021-01-01 RX ADMIN — POTASSIUM CHLORIDE 10 MEQ: 10 INJECTION, SOLUTION INTRAVENOUS at 01:03

## 2021-01-01 RX ADMIN — Medication 10 ML: at 22:43

## 2021-01-01 RX ADMIN — POTASSIUM CHLORIDE 10 MEQ: 10 INJECTION, SOLUTION INTRAVENOUS at 04:41

## 2021-01-01 RX ADMIN — ATENOLOL 50 MG: 50 TABLET ORAL at 20:31

## 2021-01-01 RX ADMIN — PIPERACILLIN AND TAZOBACTAM 2.25 G: 2; .25 INJECTION, POWDER, LYOPHILIZED, FOR SOLUTION INTRAVENOUS at 21:09

## 2021-01-01 RX ADMIN — ATENOLOL 50 MG: 50 TABLET ORAL at 19:06

## 2021-01-01 RX ADMIN — HEPARIN SODIUM 18 UNITS/KG/HR: 10000 INJECTION, SOLUTION INTRAVENOUS at 15:12

## 2021-01-01 RX ADMIN — ATENOLOL 50 MG: 50 TABLET ORAL at 18:40

## 2021-01-01 RX ADMIN — AMLODIPINE BESYLATE 5 MG: 5 TABLET ORAL at 08:33

## 2021-01-01 RX ADMIN — PIPERACILLIN AND TAZOBACTAM 2.25 G: 2; .25 INJECTION, POWDER, LYOPHILIZED, FOR SOLUTION INTRAVENOUS at 11:43

## 2021-01-01 RX ADMIN — POTASSIUM CHLORIDE 10 MEQ: 750 TABLET, EXTENDED RELEASE ORAL at 09:03

## 2021-01-01 RX ADMIN — PIPERACILLIN AND TAZOBACTAM 3.38 G: 3; .375 INJECTION, POWDER, LYOPHILIZED, FOR SOLUTION INTRAVENOUS at 19:57

## 2021-01-01 RX ADMIN — POTASSIUM CHLORIDE 10 MEQ: 10 INJECTION, SOLUTION INTRAVENOUS at 02:06

## 2021-01-01 RX ADMIN — AMLODIPINE BESYLATE 5 MG: 5 TABLET ORAL at 09:01

## 2021-01-01 RX ADMIN — FUROSEMIDE 80 MG: 10 INJECTION, SOLUTION INTRAMUSCULAR; INTRAVENOUS at 18:41

## 2021-01-01 RX ADMIN — LEVOFLOXACIN 500 MG: 5 INJECTION, SOLUTION INTRAVENOUS at 03:19

## 2021-01-01 RX ADMIN — LIDOCAINE HYDROCHLORIDE 10 ML: 10 INJECTION, SOLUTION INFILTRATION; PERINEURAL at 12:47

## 2021-01-01 RX ADMIN — INSULIN LISPRO 2 UNITS: 100 INJECTION, SOLUTION INTRAVENOUS; SUBCUTANEOUS at 12:53

## 2021-01-01 RX ADMIN — ATENOLOL 50 MG: 50 TABLET ORAL at 17:09

## 2021-01-01 RX ADMIN — PIPERACILLIN AND TAZOBACTAM 2.25 G: 2; .25 INJECTION, POWDER, LYOPHILIZED, FOR SOLUTION INTRAVENOUS at 21:06

## 2021-01-01 RX ADMIN — APIXABAN 2.5 MG: 2.5 TABLET, FILM COATED ORAL at 21:10

## 2021-01-01 RX ADMIN — MORPHINE SULFATE 2 MG: 2 INJECTION, SOLUTION INTRAMUSCULAR; INTRAVENOUS at 08:55

## 2021-01-01 RX ADMIN — HEPARIN SODIUM 3990 UNITS: 1000 INJECTION INTRAVENOUS; SUBCUTANEOUS at 04:03

## 2021-01-01 RX ADMIN — PIPERACILLIN AND TAZOBACTAM 2.25 G: 2; .25 INJECTION, POWDER, LYOPHILIZED, FOR SOLUTION INTRAVENOUS at 12:24

## 2021-01-01 RX ADMIN — APIXABAN 2.5 MG: 2.5 TABLET, FILM COATED ORAL at 23:50

## 2021-01-01 RX ADMIN — PIPERACILLIN AND TAZOBACTAM 2.25 G: 2; .25 INJECTION, POWDER, LYOPHILIZED, FOR SOLUTION INTRAVENOUS at 18:29

## 2021-01-01 RX ADMIN — LATANOPROST 1 DROP: 50 SOLUTION OPHTHALMIC at 17:53

## 2021-01-01 RX ADMIN — APIXABAN 2.5 MG: 2.5 TABLET, FILM COATED ORAL at 21:07

## 2021-01-01 RX ADMIN — HEPARIN SODIUM (PORCINE) LOCK FLUSH IV SOLN 100 UNIT/ML 500 UNITS: 100 SOLUTION at 12:48

## 2021-01-01 RX ADMIN — Medication 10 ML: at 08:48

## 2021-01-01 RX ADMIN — LOSARTAN POTASSIUM 25 MG: 25 TABLET, FILM COATED ORAL at 08:59

## 2021-01-01 RX ADMIN — LATANOPROST 1 DROP: 50 SOLUTION OPHTHALMIC at 17:47

## 2021-01-01 RX ADMIN — POTASSIUM CHLORIDE 10 MEQ: 750 TABLET, EXTENDED RELEASE ORAL at 22:42

## 2021-01-01 RX ADMIN — Medication 10 ML: at 15:33

## 2021-01-01 RX ADMIN — VANCOMYCIN HYDROCHLORIDE 750 MG: 750 INJECTION, POWDER, LYOPHILIZED, FOR SOLUTION INTRAVENOUS at 23:50

## 2021-01-01 RX ADMIN — LATANOPROST 1 DROP: 50 SOLUTION OPHTHALMIC at 18:30

## 2021-01-01 RX ADMIN — POTASSIUM CHLORIDE 10 MEQ: 750 TABLET, EXTENDED RELEASE ORAL at 20:49

## 2021-01-01 RX ADMIN — HEPARIN SODIUM 13 UNITS/KG/HR: 10000 INJECTION, SOLUTION INTRAVENOUS at 07:41

## 2021-01-01 RX ADMIN — POTASSIUM CHLORIDE 10 MEQ: 750 TABLET, EXTENDED RELEASE ORAL at 09:01

## 2021-01-01 RX ADMIN — ATENOLOL 50 MG: 50 TABLET ORAL at 18:15

## 2021-01-01 RX ADMIN — SODIUM CHLORIDE, SODIUM LACTATE, POTASSIUM CHLORIDE, AND CALCIUM CHLORIDE: 600; 310; 30; 20 INJECTION, SOLUTION INTRAVENOUS at 15:02

## 2021-01-01 RX ADMIN — Medication 100 MCG: at 16:40

## 2021-01-01 RX ADMIN — LATANOPROST 1 DROP: 50 SOLUTION OPHTHALMIC at 18:15

## 2021-01-01 RX ADMIN — PROPOFOL 100 MG: 10 INJECTION, EMULSION INTRAVENOUS at 16:07

## 2021-01-01 RX ADMIN — PIPERACILLIN AND TAZOBACTAM 3.38 G: 3; .375 INJECTION, POWDER, LYOPHILIZED, FOR SOLUTION INTRAVENOUS at 18:49

## 2021-01-01 RX ADMIN — HEPARIN SODIUM 7 UNITS/KG/HR: 10000 INJECTION, SOLUTION INTRAVENOUS at 14:09

## 2021-01-01 RX ADMIN — VANCOMYCIN HYDROCHLORIDE 1000 MG: 1 INJECTION, POWDER, LYOPHILIZED, FOR SOLUTION INTRAVENOUS at 04:14

## 2021-01-01 RX ADMIN — VANCOMYCIN HYDROCHLORIDE 750 MG: 750 INJECTION, POWDER, LYOPHILIZED, FOR SOLUTION INTRAVENOUS at 09:51

## 2021-01-01 RX ADMIN — PIPERACILLIN AND TAZOBACTAM 3.38 G: 3; .375 INJECTION, POWDER, LYOPHILIZED, FOR SOLUTION INTRAVENOUS at 06:35

## 2021-01-01 RX ADMIN — LOSARTAN POTASSIUM 25 MG: 25 TABLET, FILM COATED ORAL at 17:31

## 2021-01-01 RX ADMIN — APIXABAN 2.5 MG: 2.5 TABLET, FILM COATED ORAL at 09:04

## 2021-01-01 RX ADMIN — AMLODIPINE BESYLATE 5 MG: 5 TABLET ORAL at 08:47

## 2021-01-01 RX ADMIN — ACETAMINOPHEN 650 MG: 325 TABLET ORAL at 20:26

## 2021-01-01 RX ADMIN — POTASSIUM CHLORIDE 10 MEQ: 10 INJECTION, SOLUTION INTRAVENOUS at 09:23

## 2021-01-01 RX ADMIN — FUROSEMIDE 40 MG: 10 INJECTION, SOLUTION INTRAMUSCULAR; INTRAVENOUS at 12:13

## 2021-01-01 RX ADMIN — FUROSEMIDE 80 MG: 10 INJECTION, SOLUTION INTRAMUSCULAR; INTRAVENOUS at 09:10

## 2021-01-01 RX ADMIN — VANCOMYCIN HYDROCHLORIDE 1250 MG: 10 INJECTION, POWDER, LYOPHILIZED, FOR SOLUTION INTRAVENOUS at 00:51

## 2021-01-01 RX ADMIN — PIPERACILLIN AND TAZOBACTAM 2.25 G: 2; .25 INJECTION, POWDER, LYOPHILIZED, FOR SOLUTION INTRAVENOUS at 04:31

## 2021-01-01 RX ADMIN — PIPERACILLIN AND TAZOBACTAM 2.25 G: 2; .25 INJECTION, POWDER, LYOPHILIZED, FOR SOLUTION INTRAVENOUS at 04:14

## 2021-01-01 RX ADMIN — Medication 10 ML: at 23:51

## 2021-01-01 RX ADMIN — ALBUMIN (HUMAN) 25 G: 0.25 INJECTION, SOLUTION INTRAVENOUS at 17:50

## 2021-01-01 RX ADMIN — Medication 10 ML: at 23:19

## 2021-01-01 RX ADMIN — Medication 4 MCG/MIN: at 23:52

## 2021-01-01 RX ADMIN — FENTANYL CITRATE 25 MCG: 50 INJECTION, SOLUTION INTRAMUSCULAR; INTRAVENOUS at 16:56

## 2021-01-01 RX ADMIN — INSULIN LISPRO 2 UNITS: 100 INJECTION, SOLUTION INTRAVENOUS; SUBCUTANEOUS at 12:48

## 2021-01-01 RX ADMIN — ATENOLOL 50 MG: 50 TABLET ORAL at 19:05

## 2021-01-01 RX ADMIN — HEPARIN SODIUM 13 UNITS/KG/HR: 10000 INJECTION, SOLUTION INTRAVENOUS at 19:19

## 2021-01-01 RX ADMIN — LATANOPROST 1 DROP: 50 SOLUTION OPHTHALMIC at 19:06

## 2021-01-01 RX ADMIN — Medication 10 ML: at 06:47

## 2021-01-01 RX ADMIN — FENTANYL CITRATE 50 MCG: 50 INJECTION, SOLUTION INTRAMUSCULAR; INTRAVENOUS at 15:16

## 2021-01-01 RX ADMIN — ONDANSETRON HYDROCHLORIDE 4 MG: 2 INJECTION INTRAMUSCULAR; INTRAVENOUS at 16:56

## 2021-01-01 RX ADMIN — POTASSIUM CHLORIDE 10 MEQ: 750 TABLET, EXTENDED RELEASE ORAL at 08:30

## 2021-01-01 RX ADMIN — LIDOCAINE HYDROCHLORIDE 40 MG: 20 INJECTION, SOLUTION EPIDURAL; INFILTRATION; INTRACAUDAL; PERINEURAL at 15:09

## 2021-01-01 RX ADMIN — AMLODIPINE BESYLATE 5 MG: 5 TABLET ORAL at 19:05

## 2021-01-01 RX ADMIN — VANCOMYCIN HYDROCHLORIDE 1000 MG: 1 INJECTION, POWDER, LYOPHILIZED, FOR SOLUTION INTRAVENOUS at 07:07

## 2021-01-01 RX ADMIN — VANCOMYCIN HYDROCHLORIDE 1750 MG: 10 INJECTION, POWDER, LYOPHILIZED, FOR SOLUTION INTRAVENOUS at 01:38

## 2021-01-01 RX ADMIN — INSULIN LISPRO 2 UNITS: 100 INJECTION, SOLUTION INTRAVENOUS; SUBCUTANEOUS at 07:30

## 2021-01-01 RX ADMIN — PIPERACILLIN AND TAZOBACTAM 2.25 G: 2; .25 INJECTION, POWDER, LYOPHILIZED, FOR SOLUTION INTRAVENOUS at 15:32

## 2021-01-01 RX ADMIN — PIPERACILLIN AND TAZOBACTAM 2.25 G: 2; .25 INJECTION, POWDER, LYOPHILIZED, FOR SOLUTION INTRAVENOUS at 03:19

## 2021-01-01 RX ADMIN — LATANOPROST 1 DROP: 50 SOLUTION OPHTHALMIC at 17:09

## 2021-01-01 RX ADMIN — Medication 10 ML: at 01:06

## 2021-01-01 RX ADMIN — ALBUMIN (HUMAN) 12.5 G: 12.5 INJECTION, SOLUTION INTRAVENOUS at 22:53

## 2021-01-01 RX ADMIN — Medication 5 ML: at 14:00

## 2021-01-01 RX ADMIN — POTASSIUM CHLORIDE 10 MEQ: 10 INJECTION, SOLUTION INTRAVENOUS at 06:29

## 2021-01-01 RX ADMIN — PIPERACILLIN AND TAZOBACTAM 3.38 G: 3; .375 INJECTION, POWDER, LYOPHILIZED, FOR SOLUTION INTRAVENOUS at 00:42

## 2021-01-01 RX ADMIN — Medication 10 ML: at 02:49

## 2021-01-01 RX ADMIN — PIPERACILLIN AND TAZOBACTAM 2.25 G: 2; .25 INJECTION, POWDER, LYOPHILIZED, FOR SOLUTION INTRAVENOUS at 21:43

## 2021-01-01 RX ADMIN — POTASSIUM CHLORIDE 10 MEQ: 750 TABLET, EXTENDED RELEASE ORAL at 09:19

## 2021-01-01 RX ADMIN — PIPERACILLIN AND TAZOBACTAM 2.25 G: 2; .25 INJECTION, POWDER, LYOPHILIZED, FOR SOLUTION INTRAVENOUS at 09:02

## 2021-01-01 RX ADMIN — POTASSIUM CHLORIDE 20 MEQ: 1500 TABLET, EXTENDED RELEASE ORAL at 18:33

## 2021-01-01 RX ADMIN — AMLODIPINE BESYLATE 5 MG: 5 TABLET ORAL at 09:22

## 2021-01-01 RX ADMIN — PIPERACILLIN AND TAZOBACTAM 2.25 G: 2; .25 INJECTION, POWDER, LYOPHILIZED, FOR SOLUTION INTRAVENOUS at 07:27

## 2021-01-08 NOTE — ED PROVIDER NOTES
EMERGENCY DEPARTMENT HISTORY AND PHYSICAL EXAM 
 
Date: 1/8/2021 Patient Name: Gadiel Alejandra. History of Presenting Illness Chief Complaint Patient presents with  Chest Pain History Provided By: Patient Additional History (Context):  
11:03 AM 
Gadiel Smith is a 79 y.o. male with PMHX of hypertension, renal failure, paroxysmal A. fib with long-term use of anticoagulation, elevated BMI to borderline obesity who presents to the emergency department C/O chest pain after a fall. Patient states he fell Anh Day impacting his head and his chest.  There was no loss of consciousness or nausea vomiting or visual changes but he has had a pleuritic component of chest pain. He denies any fevers chills nausea vomiting. He denies any productive or purulent sputum. He takes long-term Keflex now to help out with chronic lymphedema both legs and has been tolerating that medication quite well. He has no other complaints. Social History Denies smoking drinking or drugs Family History Negative for coagulation or autoimmune disease PCP: Tiana Álvarez MD 
 
Current Outpatient Medications Medication Sig Dispense Refill  acetaminophen (TYLENOL) 325 mg tablet Take 3 Tabs by mouth every six (6) hours as needed for Pain. 20 Tab 0  
 traMADoL (Ultram) 50 mg tablet Take 1 Tab by mouth every eight (8) hours as needed for Pain for up to 7 days. Max Daily Amount: 150 mg. 18 Tab 0  
 trimethoprim-sulfamethoxazole (BACTRIM DS, SEPTRA DS) 160-800 mg per tablet Take 1 Tab by mouth two (2) times a day.  loratadine (CLARITIN) 10 mg tablet Take 10 mg by mouth daily.  apixaban (ELIQUIS) 2.5 mg tablet Take 2.5 mg by mouth two (2) times a day.  acetaminophen/diphenhydramine (TYLENOL PM PO) Take 1 Tab by mouth nightly.  melatonin 5 mg cap capsule Take 5 mg by mouth as needed.  cephALEXin (KEFLEX) 500 mg capsule Take 500 mg by mouth two (2) times a day.  atenolol (TENORMIN) 25 mg tablet Take 50 mg by mouth every evening.  bimatoprost (LUMIGAN) 0.03 % ophthalmic drops Administer 1 drop to both eyes every evening.  telmisartan-hydrochlorothiazide (MICARDIS HCT) 80-25 mg per tablet Take 1 Tab by mouth daily. Indications: HYPERTENSION  potassium chloride SR (KLOR-CON 10) 10 mEq tablet Take 10 mEq by mouth two (2) times a day.  furosemide (LASIX) 20 mg tablet Take 80 mg by mouth daily. Indications: hypertension  niacin (NIASPAN) 1,000 mg Tb24 tab Take 1,000 mg by mouth nightly.  multivitamin (ONE A DAY) tablet Take 1 Tab by mouth daily.  calcium-cholecalciferol, d3, (CALCIUM 600 + D) 600-125 mg-unit Tab Take  by mouth. Past History Past Medical History: 
Past Medical History:  
Diagnosis Date  ARF (acute renal failure) ([facility] [address] 6/3/2013  Arrhythmia A-Fib  Arthritis  Atrial fibrillation ([facility] [address]  Cellulitis  Hypertension  Unspecified sleep apnea   
 recently notified doesn't need c-pap Past Surgical History: 
Past Surgical History:  
Procedure Laterality Date  HX CATARACT REMOVAL    
 HX ORTHOPAEDIC    
 bilateral knee replacments  HX ORTHOPAEDIC  2017  
 betancourt procedure Family History: 
Family History Problem Relation Age of Onset  Cancer Mother  Heart Disease Father  Cancer Sister  Hypertension Sister Social History: 
Social History Tobacco Use  Smoking status: Never Smoker  Smokeless tobacco: Never Used Substance Use Topics  Alcohol use: No  
 Drug use: No  
 
 
Allergies: Allergies Allergen Reactions  Ambien [Zolpidem] Other (comments) Hallucinations Review of Systems Review of Systems Constitutional: Negative. HENT: Negative. Eyes: Negative. Respiratory: Positive for chest tightness. Cardiovascular: Positive for chest pain. Gastrointestinal: Negative. Endocrine: Negative. Genitourinary: Negative. Musculoskeletal: Negative. Skin: Positive for wound. Right periorbital and temporal contusion Allergic/Immunologic: Negative. Neurological: Negative. Hematological: Bruises/bleeds easily. All other systems reviewed and are negative. Physical Exam  
 
Vitals:  
 01/08/21 1006 01/08/21 1100 01/08/21 1230 BP: (!) 151/84 (!) 152/81 (!) 152/80 Pulse: (!) 105 90 98 Resp: 28 22 20 Temp: 98.5 °F (36.9 °C) SpO2: 100% 99% 96% Weight: 102 kg (224 lb 13.9 oz) Height: 6' (1.829 m) Physical Exam 
Vitals signs and nursing note reviewed. Constitutional:   
   General: He is not in acute distress. Appearance: He is well-developed and overweight. He is not ill-appearing, toxic-appearing or diaphoretic. HENT:  
   Head: Normocephalic. Comments: Prominent contusion of greenish-yellow appearance to the right supraorbital ridge and temporal fossa. There is no laceration or skin breakdown. There is no crepitus. Right Ear: Tympanic membrane, ear canal and external ear normal.  
   Left Ear: Tympanic membrane, ear canal and external ear normal.  
Eyes:  
   General: No scleral icterus. Extraocular Movements:  
   Right eye: Normal extraocular motion. Left eye: Normal extraocular motion. Conjunctiva/sclera: Conjunctivae normal.  
   Pupils: Pupils are equal, round, and reactive to light. Neck: Musculoskeletal: Normal range of motion and neck supple. Trachea: No tracheal deviation. Cardiovascular:  
   Rate and Rhythm: Normal rate and regular rhythm. Heart sounds: Normal heart sounds. Pulmonary:  
   Effort: Pulmonary effort is normal. No respiratory distress. Breath sounds: Normal breath sounds. No stridor. Abdominal:  
   General: Bowel sounds are normal. There is no distension. Palpations: Abdomen is soft. Tenderness: There is no abdominal tenderness. There is no rebound. Musculoskeletal: Normal range of motion. General: No tenderness. Comments: Grossly unremarkable without abnormalities Skin: 
   General: Skin is warm and dry. Capillary Refill: Capillary refill takes less than 2 seconds. Findings: No erythema or rash. Neurological:  
   Mental Status: He is alert and oriented to person, place, and time. Cranial Nerves: No cranial nerve deficit. Motor: No weakness. Deep Tendon Reflexes: Reflexes are normal and symmetric. Psychiatric:     
   Mood and Affect: Mood normal.     
   Behavior: Behavior normal.     
   Thought Content: Thought content normal.     
   Judgment: Judgment normal.  
 
 
 
Diagnostic Study Results Labs - Lab Results 
 
   
Contains abnormal data CBC WITH AUTOMATED DIFF (Final result) 
 Component (Lab Inquiry) Collection Time Result Time WBC RBC HGB HCT MCV MCH MCHC RDW PLT MPLV  
01/08/21 10:00:00 01/08/21 10:17:29 15. 2High  2.88Low  8.4Low  26.7Low  92.7 29.2 31.5 17.4High  335 7.6Low Previous Results 08/09/19 12:18:00 08/09/19 13:03:07   8.5Low  26.8Low         
07/24/19 11:43:00 07/24/19 12:26:52 7.0 2.94Low  8.4Low  25.8Low  87.8 28.6 32.6 14.2 291 7.9Low   
07/09/19 09:41:00 07/09/19 11:15:27 10.1 2.99Low  8.6Low  26.6Low  89.0 28.8 32.3 14.4 358 8.5Low   
05/28/19 12:41:00 05/28/19 13:01:21 7.9 3.33Low  9.8Low  30.1Low  90.4 29.4 32.6 14.2 223 8.0Low   
09/20/17 11:09:00 09/20/17 11:48:19 7.3 3.76Low  10.7Low  33.2Low  88.3 28.5 32.2 14.5 313 9.1Low   
   
Collection Time Result Time GRANS LYMPH MONOS EOS BASOS ABG ABL ABM CLIFF ABB  
01/08/21 10:00:00 01/08/21 10:17:29 83High  8Low  8 1 0 12. 7High  1.1 1.2 0.1 0.0 Previous Results 08/09/19 12:18:00 08/09/19 13:03:07            
07/24/19 11:43:00 07/24/19 12:26:52 62 21 14High  3 0 4.3 1.4 1.0 0.2 0.0  
07/09/19 09:41:00 07/09/19 11:15:27 68 16Low  13High  3 0 6.9 1.6 1.3High  0.3 0.0  
05/28/19 12:41:00 05/28/19 13:01:21            
 09/20/17 11:09:00 09/20/17 11:48:19 60 25 9 6High  0 4.4 1.9 0.6 0.4 0.0  
   
Collection Time Result Time DF  
01/08/21 10:00:00 01/08/21 10:17:29 AUTOMATED Previous Results 08/09/19 12:18:00 08/09/19 13:03:07   
07/24/19 11:43:00 07/24/19 12:26:52 AUTOMATED  
07/09/19 09:41:00 07/09/19 11:15:27 AUTOMATED  
05/28/19 12:41:00 05/28/19 13:01:21   
09/20/17 11:09:00 09/20/17 11:48:19 AUTOMATED  
   
Final result  
   
  
  
  
   
   
Contains abnormal data METABOLIC PANEL, COMPREHENSIVE (Final result) 
 Component (Lab Inquiry) Collection Time Result Time NA K CL CO2 AGAP GLU BUN CREA BUCR GFRAA  
01/08/21 10:00:00 01/08/21 11:12:59 139 3.4Low  104 26 9 147High  33High  1. 66High  20 48Low Previous Results 08/09/19 12:18:00 08/09/19 13:24:00  3.8          
05/28/19 12:41:00 05/28/19 13:52:13 140 4.3 101 29 10 185High  43High  1. 68High  26High  48Low   
09/20/17 11:09:00 09/20/17 11:40:36 139 3.7 103 30 6 107High  26High  1. 31High  20 >60  
11/21/14 05:55:00 11/21/14 06:36:59 140 3.6 108 23 9 109High  19High  1.02 19 >60  
11/20/14 02:30:00 11/20/14 04:19:48 141 3.7 107 25 9 101High  26High  1.15 23High  >60  
   
Collection Time Result Time GFRNA CA TBIL GPT SGOT AP TP ALB GLOB AGRAT  
01/08/21 10:00:00 01/08/21 11:12:59 40  
(NOTE) Estimated GFR . Clenton Reas Clenton Reas Low  8.3Low  0.8 30 31 132High  6.6 2.7Low  3.9 0.7Low Previous Results 08/09/19 12:18:00 08/09/19 13:24:00            
05/28/19 12:41:00 05/28/19 13:52:13 40  
(NOTE) Estimated GFR . Francois Parrish Reas Low  8.4Low  0.4 16 23 74 6.8 3.1Low  3.7 0.8  
09/20/17 11:09:00 09/20/17 11:40:36 53  
(NOTE) Estimated GFR . Francois Parrish Reas Low  9.5          
11/21/14 05:55:00 11/21/14 06:36:59 >60 8.3Low           
11/20/14 02:30:00 11/20/14 04:19:48 >60 8.3Low           
  Final result  
   
  
  
  
   
   
CARDIAC PANEL,(CK, CKMB & TROPONIN) (Final result) 
 Component (Lab Inquiry) Collection Time Result Time CKRMB CKND1 CPK TROQR  
01/08/21 10:00:00 01/08/21 10:37:59 1.8 1.9 94 0.02  
 The presence of detect. ..  
 
   
Final result No results found for this or any previous visit (from the past 12 hour(s)). Radiologic Studies -  
XR CHEST PORT Final Result IMPRESSION:  
  
Cardiomegaly. No acute radiographic abnormality. CT Results  (Last 48 hours) None CXR Results  (Last 48 hours) 01/08/21 1024  XR CHEST PORT Final result Impression:  IMPRESSION:  
   
Cardiomegaly. No acute radiographic abnormality. Narrative:  EXAM: XR CHEST PORT  
   
CLINICAL INDICATION/HISTORY: Chest pain  
-Additional: None COMPARISON: 11/17/2014 TECHNIQUE: Portable frontal view of the chest  
   
_______________ FINDINGS:  
   
SUPPORT DEVICES: None. HEART AND MEDIASTINUM: Cardiomegaly. LUNGS AND PLEURAL SPACES: No dense consolidation, large effusion or  
pneumothorax.  
   
_______________ Medications given in the ED- Medications  
traMADoL (ULTRAM) tablet 50 mg (50 mg Oral Given 1/8/21 1108) acetaminophen (TYLENOL) tablet 975 mg (975 mg Oral Given 1/8/21 1108) Medical Decision Making I am the first provider for this patient. I reviewed the vital signs, available nursing notes, past medical history, past surgical history, family history and social history. Vital Signs-Reviewed the patient's vital signs. Pulse Oximetry Analysis -99% on air Cardiac Monitor: 
Rate: 89 bpm 
Rhythm: Sinus rhythm EKG interpretation: (Preliminary) 11:03 AM  
A. fib with borderline RVR at 105, nonspecific ST changes, right bundle branch block with anticipated repolarization abnormality, left axis deviation at -61, QTC is 499 EKG read by Deya Boyle MD 
 
Records Reviewed: NURSING NOTES AND PREVIOUS MEDICAL RECORDS Provider Notes (Medical Decision Making):  
 Patient is fall with injury impacting the right chest.  There is no evidence of fracture or bony malalignment. There are no symptoms of pneumonia or productive sputum. There is no likelihood of pneumonia from not breathing, there is no evidence of internal bleeding or pneumothorax. Possible but unlikely this represents ACS, cardiac arrhythmia, pulmonary embolism, thoracic aortic aneurysm. We will review his blood work for abnormalities provide supportive medications and may control here. Patient's disposition likely home pending results of our studies. Procedures: 
Procedures ED Course:  
11:03 AM: Initial assessment performed. The patients presenting problems have been discussed, and they are in agreement with the care plan formulated and outlined with them. I have encouraged them to ask questions as they arise throughout their visit. Diagnosis and Disposition DISCHARGE NOTE: 
 
Carol Hester Jr.'s  results have been reviewed with him. He has been counseled regarding his diagnosis, treatment, and plan. He verbally conveys understanding and agreement of the signs, symptoms, diagnosis, treatment and prognosis and additionally agrees to follow up as discussed. He also agrees with the care-plan and conveys that all of his questions have been answered. I have also provided discharge instructions for him that include: educational information regarding their diagnosis and treatment, and list of reasons why they would want to return to the ED prior to their follow-up appointment, should his condition change. He has been provided with education for proper emergency department utilization. CLINICAL IMPRESSION: 
 
1. Contusion of chest wall, unspecified laterality, initial encounter 2. Pleuritic chest pain 3. Paroxysmal A-fib ([address] 4. Encounter for current long-term use of anticoagulants PLAN: 
1. D/C Home 2.   
Discharge Medication List as of 1/8/2021 11:51 AM  
  
 START taking these medications Details  
traMADoL (Ultram) 50 mg tablet Take 1 Tab by mouth every eight (8) hours as needed for Pain for up to 7 days. Max Daily Amount: 150 mg., Print, Disp-18 Tab, R-0  
  
acetaminophen (TYLENOL) 325 mg tablet Take 3 Tabs by mouth every six (6) hours as needed for Pain., Print, Disp-20 Tab, R-0  
  
  
CONTINUE these medications which have NOT CHANGED Details  
trimethoprim-sulfamethoxazole (BACTRIM DS, SEPTRA DS) 160-800 mg per tablet Take 1 Tab by mouth two (2) times a day., Historical Med  
  
loratadine (CLARITIN) 10 mg tablet Take 10 mg by mouth daily. , Historical Med  
  
apixaban (ELIQUIS) 2.5 mg tablet Take 2.5 mg by mouth two (2) times a day., Historical Med  
  
acetaminophen/diphenhydramine (TYLENOL PM PO) Take 1 Tab by mouth nightly., Historical Med  
  
melatonin 5 mg cap capsule Take 5 mg by mouth as needed., Historical Med  
  
cephALEXin (KEFLEX) 500 mg capsule Take 500 mg by mouth two (2) times a day., Historical Med  
  
atenolol (TENORMIN) 25 mg tablet Take 50 mg by mouth every evening., Historical Med  
  
bimatoprost (LUMIGAN) 0.03 % ophthalmic drops Administer 1 drop to both eyes every evening., Historical Med  
  
telmisartan-hydrochlorothiazide (MICARDIS HCT) 80-25 mg per tablet Take 1 Tab by mouth daily. Indications: HYPERTENSIONHistorical Med, 1 Tab  
  
potassium chloride SR (KLOR-CON 10) 10 mEq tablet Take 10 mEq by mouth two (2) times a day. Historical Med, 10 mEq  
  
furosemide (LASIX) 20 mg tablet Take 80 mg by mouth daily. Indications: hypertension, Historical Med  
  
niacin (NIASPAN) 1,000 mg Tb24 tab Take 1,000 mg by mouth nightly. Historical Med, 1,000 mg  
  
multivitamin (ONE A DAY) tablet Take 1 Tab by mouth daily. Historical Med, 1 Tab  
  
calcium-cholecalciferol, d3, (CALCIUM 600 + D) 600-125 mg-unit Tab Take  by mouth. Historical Med 3. Follow-up Information Follow up With Specialties Details Why Contact Info Kinza Perez MD Family Medicine In 1 week  [address] [facility] 
[phone] [facility] EMERGENCY DEPT Emergency Medicine  As needed [address] [facility] [id number] [phone]  
  
 
_______________________________ This note was partially transcribed via voice recognition software. Although efforts have been made to catch any discrepancies, it may contain sound alike words, grammatical errors, or nonsensical words.

## 2021-01-13 PROBLEM — I50.9 ACUTE CHF (CONGESTIVE HEART FAILURE) (HCC): Status: ACTIVE | Noted: 2021-01-01

## 2021-01-13 PROBLEM — I89.0 LYMPHEDEMA: Status: ACTIVE | Noted: 2021-01-01

## 2021-01-13 NOTE — PROGRESS NOTES
Problem: Falls - Risk of 
Goal: *Absence of Falls Description: Document Praneeth Talley Fall Risk and appropriate interventions in the flowsheet. Outcome: Progressing Towards Goal 
Note: Fall Risk Interventions: 
Mobility Interventions: Communicate number of staff needed for ambulation/transfer Elimination Interventions: Call light in reach History of Falls Interventions: Investigate reason for fall

## 2021-01-13 NOTE — PROGRESS NOTES
Pharmacy Dosing Services: Vancomycin Consult for Vancomycin Dosing by Pharmacy by Dr. Shubham Calle Consult provided for this 79y.o. year old male , for indication of Skin and Soft Tissue Infection. Day of Therapy 1 Ht Readings from Last 1 Encounters:  
01/08/21 182.9 cm (72\") Wt Readings from Last 1 Encounters:  
01/13/21 101 kg (222 lb 9.6 oz) Other Current Antibiotics Zosyn 3.375 grams IV q6h Significant Cultures N/a Serum Creatinine Lab Results Component Value Date/Time Creatinine 1.62 (H) 01/13/2021 10:21 AM  
  
Creatinine Clearance Estimated Creatinine Clearance: 44 mL/min (A) (based on SCr of 1.62 mg/dL (H)). BUN Lab Results Component Value Date/Time BUN 43 (H) 01/13/2021 10:21 AM  
  
WBC Lab Results Component Value Date/Time WBC 13.0 01/13/2021 10:21 AM  
  
H/H Lab Results Component Value Date/Time HGB 8.1 (L) 01/13/2021 10:21 AM  
  
Platelets Lab Results Component Value Date/Time PLATELET 849 54/00/8258 10:21 AM  
  
Temp 98.3 °F (36.8 °C) Start Vancomycin therapy, with loading dose: Vancomycin 2000 mg IV once, scheduled for 1/13/21 at 19:00 Follow with maintenance dose: Vancomycin 1750 mg IV q24h Dose calculated to approximate a therapeutic trough:  10 - 15 mcg/mL. Pharmacy to follow daily and will make changes to dose and/or frequency based on clinical status. Pharmacist Anna Ren, [address]

## 2021-01-13 NOTE — ED TRIAGE NOTES
Per EMS they were called for a pt with weakness and increased leg swelling. Pt states that he is unable to ambulate as normal due to his legs feeling too heavy. Pt states his legs are typically very edematous but feels they have gotten worse over the past week.

## 2021-01-13 NOTE — ED NOTES
TRANSFER - OUT REPORT: 
 
Verbal report given to [employer] (name) on [place]ila [place].  being transferred to 345(unit) for routine progression of care Report consisted of patients Situation, Background, Assessment and  
Recommendations(SBAR). Information from the following report(s) ED Summary was reviewed with the receiving nurse. Lines:  
Peripheral IV 01/13/21 Right Hand (Active) Site Assessment Clean, dry, & intact 01/13/21 1032 Phlebitis Assessment 0 01/13/21 1032 Infiltration Assessment 0 01/13/21 1032 Dressing Status Clean, dry, & intact 01/13/21 1032 Action Taken Blood drawn 01/13/21 1032 Opportunity for questions and clarification was provided. Patient transported with: 
 Registered Nurse

## 2021-01-13 NOTE — ED PROVIDER NOTES
EMERGENCY DEPARTMENT HISTORY AND PHYSICAL EXAM 
 
Date: 1/13/2021 Patient Name: Gadiel Alejandra. History of Presenting Illness No chief complaint on file. History Provided By: Patient and EMS Additional History (Context):  
Gadiel Smith is a 79 y.o. male with PMHX hypertension, CKD, A. fib on Eliquis chronic lower extremity lymphedema presents to the emergency department via ambulance C/O increased weakness to his lower extremities due to increasing leg swelling. Patient reports that he does have a history of chronic lymphedema and is on Lasix and HCTZ. However last night due to the significant leg swelling, difficulty walking up the stairs to his bedroom and was unable to ambulate at all this morning. Denies any recent falls. Pt denies chest pain, shortness of breath, abdominal pain, nausea, vomiting, and any other sxs or complaints. No relieving or exacerbating factors identified. Patient reports that he has a chronic draining wound to his left lower extremity. States that he just changed his dressing himself last night. Reports that he is currently on Keflex which was prescribed by his primary care doctor. Denies any tobacco or alcohol use or drug use. PCP: Tiana Álvarez MD 
 
Current Facility-Administered Medications Medication Dose Route Frequency Provider Last Rate Last Admin  sodium chloride (NS) flush 5-10 mL  5-10 mL IntraVENous PRN Aliza Cornejo,       
 
Current Outpatient Medications Medication Sig Dispense Refill  acetaminophen (TYLENOL) 325 mg tablet Take 3 Tabs by mouth every six (6) hours as needed for Pain. 20 Tab 0  
 traMADoL (Ultram) 50 mg tablet Take 1 Tab by mouth every eight (8) hours as needed for Pain for up to 7 days. Max Daily Amount: 150 mg. 18 Tab 0  
 trimethoprim-sulfamethoxazole (BACTRIM DS, SEPTRA DS) 160-800 mg per tablet Take 1 Tab by mouth two (2) times a day.  loratadine (CLARITIN) 10 mg tablet Take 10 mg by mouth daily.  apixaban (ELIQUIS) 2.5 mg tablet Take 2.5 mg by mouth two (2) times a day.  acetaminophen/diphenhydramine (TYLENOL PM PO) Take 1 Tab by mouth nightly.  melatonin 5 mg cap capsule Take 5 mg by mouth as needed.  cephALEXin (KEFLEX) 500 mg capsule Take 500 mg by mouth two (2) times a day.  atenolol (TENORMIN) 25 mg tablet Take 50 mg by mouth every evening.  bimatoprost (LUMIGAN) 0.03 % ophthalmic drops Administer 1 drop to both eyes every evening.  telmisartan-hydrochlorothiazide (MICARDIS HCT) 80-25 mg per tablet Take 1 Tab by mouth daily. Indications: HYPERTENSION  potassium chloride SR (KLOR-CON 10) 10 mEq tablet Take 10 mEq by mouth two (2) times a day.  furosemide (LASIX) 20 mg tablet Take 80 mg by mouth daily. Indications: hypertension  niacin (NIASPAN) 1,000 mg Tb24 tab Take 1,000 mg by mouth nightly.  multivitamin (ONE A DAY) tablet Take 1 Tab by mouth daily.  calcium-cholecalciferol, d3, (CALCIUM 600 + D) 600-125 mg-unit Tab Take  by mouth. Past History Past Medical History: 
Past Medical History:  
Diagnosis Date  ARF (acute renal failure) ([facility] [place] [address] 6/3/2013  Arrhythmia A-Fib  Arthritis  Atrial fibrillation ([facility] [place] [address]  Cellulitis  Hypertension  Unspecified sleep apnea   
 recently notified doesn't need c-pap Past Surgical History: 
Past Surgical History:  
Procedure Laterality Date  HX CATARACT REMOVAL    
 HX ORTHOPAEDIC    
 bilateral knee replacments  HX ORTHOPAEDIC  2017  
 betancourt procedure Family History: 
Family History Problem Relation Age of Onset  Cancer Mother  Heart Disease Father  Cancer Sister  Hypertension Sister Social History: 
Social History Tobacco Use  Smoking status: Never Smoker  Smokeless tobacco: Never Used Substance Use Topics  Alcohol use: No  
 Drug use:  No  
 Allergies: Allergies Allergen Reactions  Ambien [Zolpidem] Other (comments) Hallucinations Review of Systems Review of Systems Constitutional: Negative for chills and fever. HENT: Negative for congestion, ear pain, sinus pain and sore throat. Eyes: Negative for pain and visual disturbance. Respiratory: Negative for cough and shortness of breath. Cardiovascular: Negative for chest pain and leg swelling. Gastrointestinal: Negative for abdominal pain, constipation, diarrhea, nausea and vomiting. Genitourinary: Negative for dysuria and hematuria. Musculoskeletal: Positive for joint swelling. Negative for back pain and neck pain. Skin: Negative for pallor and rash. Neurological: Negative for dizziness, tremors, weakness, light-headedness and headaches. All other systems reviewed and are negative. Physical Exam  
There were no vitals filed for this visit. Physical Exam 
 
Nursing note and vitals reviewed Constitutional: Elderly  male, nontoxic Head: Normocephalic, Atraumatic Eyes: Pupils are equal, round, and reactive to light, EOMI Neck: Supple, non-tender Cardiovascular: Regular rate and rhythm, no murmurs, rubs, or gallops, + 2 radial pulses bilaterally Chest: Normal work of breathing and chest excursion bilaterally Lungs: Clear to ausculation bilaterally, no wheezes, no rhonchi Abdomen: Soft, non tender, non distended, normoactive bowel sounds Back: No evidence of trauma or deformity Extremities: Significant lymphedema to his lower extremities bilaterally with +4 pitting edema to his right and left lower extremity. Left lower extremity with circumferential erythema. Small ulcer along the proximal portion of his anterior shin. There is no purulence or fluctuance. No streaking erythema, vesicular lesions, ulcerations or bulla Skin: Warm and dry, normal cap refill Neuro: Alert and appropriate, CN intact, normal speech, moving all 4 extremities freely and symmetrically Psychiatric: Normal mood and affect Diagnostic Study Results Labs - No results found for this or any previous visit (from the past 12 hour(s)). Radiologic Studies -  
CT HEAD WO CONT    (Results Pending) XR CHEST PORT    (Results Pending) CT Results  (Last 48 hours) None CXR Results  (Last 48 hours) None Medical Decision Making I am the first provider for this patient. I reviewed the vital signs, available nursing notes, past medical history, past surgical history, family history and social history. Vital Signs-Reviewed the patient's vital signs. Pulse Oximetry Analysis -% on room air Cardiac Monitor: 
Rate: 101 bpm 
Rhythm: Regular EKG interpretation: (Preliminary) 9:59 AM  
A. fib at 103 bpm. Bifascicular block. No acute ST elevation Records Reviewed: Nursing Notes and Old Medical Records Provider Notes:  
79 y.o. male with a history of A. fib on Eliquis, history of chronic lymphedema, CKd, hypertension presents with increasing lower extremity edema and difficulty walking. On exam, he is afebrile, normotensive and not tachycardic. Patient has significant lymphedema to his lower extremities bilaterally. There is +4 pitting edema with the left lower extremity with circumferential erythema and a small ulcer on his proximal shin. There is no purulence or fluctuance. Procedures: 
Procedures ED Course:  
9:59 AM 
 Initial assessment performed. The patients presenting problems have been discussed, and they are in agreement with the care plan formulated and outlined with them. I have encouraged them to ask questions as they arise throughout their visit.  
 
12:09 PM 
 Patient with a hemoglobin of 8.1. This appears to be his baseline. Patient with a history of chronic anemia. Chest x-ray showing no pulmonary edema however patient with significantly elevated BNP and with his lymphedema is consistent with acute CHF. Patient would benefit from aggressive diuresis admission. Diagnosis and Disposition 12:08 PM 
I have spent 35 minutes of critical care time involved in lab review, consultations with specialist, family decision-making, and documentation. During this entire length of time I was immediately available to the patient. Critical Care: The reason for providing this level of medical care for this critically ill patient was due a critical illness that impaired one or more vital organ systems such that there was a high probability of imminent or life threatening deterioration in the patients condition. This care involved high complexity decision making to assess, manipulate, and support vital system functions, to treat this degreee vital organ system failure and to prevent further life threatening deterioration of the patients condition. Core Measures: 
For Hospitalized Patients: 
 
1. Hospitalization Decision Time: The decision to hospitalize the patient was made by Morales Soto DO at 12:08 PM on 1/13/2021 2. Aspirin: Aspirin was not given because the patient did not present with a stroke at the time of their Emergency Department evaluation 12:04 PM  Patient is being admitted to the hospital by Osman Ho MD. The results of their tests and reasons for their admission have been discussed with them and/or available family. They convey agreement and understanding for the need to be admitted and for their admission diagnosis. CONDITIONS ON ADMISSION: Pneumonia is not present at the time of admission. MRSA is not present at the time of admission. Wound infection is not present at the time of admission. Pressure Ulcer is not present at the time of admission. CLINICAL IMPRESSION: 
 
1. Acute congestive heart failure, unspecified heart failure type ([address] 2. Lymphedema   
 
____________________________________ Please note that this dictation was completed with [employer], the computer voice recognition software. Quite often unanticipated grammatical, syntax, homophones, and other interpretive errors are inadvertently transcribed by the computer software. Please disregard these errors. Please excuse any errors that have escaped final proofreading.

## 2021-01-13 NOTE — PROGRESS NOTES
1330: Received phone report from nurse [facility] in ED 
1500:Patient arrived from ED, this nurse at bedside to provide patient care. Informed Dr. Prado Heads patient needs wound care consult as he has open wound on LLE and two ulcers on the bottom of Rfoot with toe amputations.

## 2021-01-13 NOTE — H&P
History & Physical 
 
Patient: Edilma Juarez MRN: [id number]  CSN: [id number] YOB: 1940  Age: 79 y.o. Sex: male DOA: 1/13/2021 Chief Complaint:  
Chief Complaint Patient presents with  Lethargy  Leg Swelling HPI:  
 
Edilma Juarez is a 79 y.o.  male who has hx of Afib, HTN,, presents with worsening lower extremity edema and swelling progressive over the last week; patient has been trying escalating doses of lasix with no improvement of swelling; The day of admission patient realized he could not go up stairs; He has a arranged to get a stair lift/ 
Patient came to ER few days ago with chest pain X-rays and labs were stable he did sustain a fall and developed  Bruised ribs In the ER he was given 80mg IV lasix with rapid diuresis but persistent edema and worsening Nilesh Patient also with asymptomatic ulcer on opposite foot Asked to admit for further tx Past Medical History:  
Diagnosis Date  ARF (acute renal failure) ([facility] [address] 6/3/2013  Arrhythmia A-Fib  Arthritis  Atrial fibrillation ([facility] [address]  Cellulitis  Hypertension  Unspecified sleep apnea   
 recently notified doesn't need c-pap Past Surgical History:  
Procedure Laterality Date  HX CATARACT REMOVAL    
 HX ORTHOPAEDIC    
 bilateral knee replacments  HX ORTHOPAEDIC  2017  
 betancourt procedure Family History Problem Relation Age of Onset  Cancer Mother  Heart Disease Father  Cancer Sister  Hypertension Sister Social History Socioeconomic History  Marital status:  Spouse name: Not on file  Number of children: Not on file  Years of education: Not on file  Highest education level: Not on file Tobacco Use  Smoking status: Never Smoker  Smokeless tobacco: Never Used Substance and Sexual Activity  Alcohol use: No  
 Drug use: No  
 
 
Prior to Admission medications Medication Sig Start Date End Date Taking? Authorizing Provider  
acetaminophen (TYLENOL) 325 mg tablet Take 3 Tabs by mouth every six (6) hours as needed for Pain. 1/8/21   Valeri Lopez MD  
traMADoL (Ultram) 50 mg tablet Take 1 Tab by mouth every eight (8) hours as needed for Pain for up to 7 days. Max Daily Amount: 150 mg. 1/8/21 1/15/21  Valeri Lopez MD  
loratadine (CLARITIN) 10 mg tablet Take 10 mg by mouth daily. Provider, Historical  
apixaban (ELIQUIS) 2.5 mg tablet Take 2.5 mg by mouth two (2) times a day. Provider, Historical  
acetaminophen/diphenhydramine (TYLENOL PM PO) Take 1 Tab by mouth nightly. Provider, Historical  
melatonin 5 mg cap capsule Take 5 mg by mouth as needed. Provider, Historical  
cephALEXin (KEFLEX) 500 mg capsule Take 500 mg by mouth two (2) times a day. Provider, Historical  
atenolol (TENORMIN) 25 mg tablet Take 50 mg by mouth every evening. Other, MD Ashley  
bimatoprost (LUMIGAN) 0.03 % ophthalmic drops Administer 1 drop to both eyes every evening. Other, MD Ashley  
telmisartan-hydrochlorothiazide (MICARDIS HCT) 80-25 mg per tablet Take 1 Tab by mouth daily. Indications: HYPERTENSION    Provider, Historical  
potassium chloride SR (KLOR-CON 10) 10 mEq tablet Take 10 mEq by mouth two (2) times a day. Provider, Historical  
furosemide (LASIX) 20 mg tablet Take 80 mg by mouth daily. Indications: hypertension    Provider, Historical  
niacin (NIASPAN) 1,000 mg Tb24 tab Take 1,000 mg by mouth nightly. Provider, Historical  
multivitamin (ONE A DAY) tablet Take 1 Tab by mouth daily. Provider, Historical  
calcium-cholecalciferol, d3, (CALCIUM 600 + D) 600-125 mg-unit Tab Take  by mouth. Provider, Historical  
 
 
Allergies Allergen Reactions  Ambien [Zolpidem] Other (comments) Hallucinations Review of Systems GENERAL: Patient alert, awake and oriented times 3, able to communicate full sentences and not in distress. HEENT: No change in vision, no earache, tinnitus, sore throat or sinus congestion. NECK: No pain or stiffness. PULMONARY: No shortness of breath, cough or wheeze. Pos chest pain  
Cardiovascular: no pnd or orthopnea, + CP 
GASTROINTESTINAL: No abdominal pain, nausea, vomiting or diarrhea, melena or bright red blood per rectum. GENITOURINARY:+ urinary frequency, urgency, hesitancy or dysuria. MUSCULOSKELETAL: No joint or muscle pain, no back pain, no recent trauma. Bilateral leg edema DERMATOLOGIC: right foot ulcer and big toe area ulcerations . ENDOCRINE: No polyuria, polydipsia, no heat or cold intolerance. No recent change in weight. HEMATOLOGICAL: +anemia or easy bruising or bleeding. On Eloquis NEUROLOGIC: No headache, seizures, numbness, tingling +r weakness. Physical Exam:  
 
Physical Exam: 
Visit Vitals BP (!) 171/83 (BP 1 Location: Right arm, BP Patient Position: At rest) Pulse (!) 104 Temp 98.3 °F (36.8 °C) Resp 18 SpO2 100% O2 Device: Room air Temp (24hrs), Av.3 °F (36.8 °C), Min:98 °F (36.7 °C), Max:98.7 °F (37.1 °C) No intake/output data recorded. No intake/output data recorded. General:  Alert, cooperative, no distress, appears stated age. Head: Normocephalic, without obvious abnormality, atraumatic. Eyes:  Conjunctivae/corneas clear. PERRL, EOMs intact. Nose: Nares normal. No drainage or sinus tenderness. Neck: Supple, symmetrical, trachea midline, no adenopathy, thyroid: no enlargement, no carotid bruit and no JVD. Lungs:   Clear to auscultation bilaterally. Heart:  irreular ate and rhythm, S1, S2 normal.  
  Abdomen: Soft, non-tender. Bowel sounds normal.   
Extremities: Extremities normal, atraumatic, no cyanosis +3edema. Pulses: 2+ and symmetric all extremities. ulceration noted on left leg with warmth and erythema plus4 edema quarter size ulceration and yellow pustules right foot Ulcerations noted on left leg with warm erythema plus 4 pitting edema right foot quarter size ulceration yellow granulation near toe Neurologic: AAOx3, No focal motor or sensory deficit. Labs Reviewed: All lab results for the last 24 hours reviewed. and EKG Procedures/imaging: see electronic medical records for all procedures/Xrays and details which were not copied into this note but were reviewed prior to creation of Plan Assessment/Plan Principal Problem: 
  Lymphedema (1/13/2021) IV lasix 
cheack duplex LEADS Doppler venous ultrasound Active Problems: 
  Cellulitis and abscess of leg, except foot (1/13/2013) IV Zosyn/Vanc Pharmacy dosing Wound care consult Acute CHF (congestive heart failure) ([facility] [address] (1/13/2021) Check echo IV lasix Hold ARB due to worsening Creatine Cardiac enzymes Diabetic Foot Ulcer Will obtain Podiatry Consult IV abx Wound care consult Check MRI And LeADS 
 
PAF Continue Eloquis RITA Holding ARB for now Avoid nephrotoxins Agents Covid Testing for Placement Plan: 
Admit to [facility] IV Zosyn Vanc Electrolyte replacement IV lasix Wound care consult Podiatry Consult Mri foot DVT/GI Prophylaxis: Hep SQ Trell Alba MD 
1/13/2021 5:28 PM

## 2021-01-13 NOTE — PROGRESS NOTES
Reason for Admission:   Chart reviewed, per ED H&P, patient is [de-identified] 79 y.o. male with PMHX hypertension, CKD, A. fib on Eliquis chronic lower extremity lymphedema presents to the emergency department via ambulance C/O increased weakness to his lower extremities due to increasing leg swelling. Patient reports that he does have a history of chronic lymphedema and is on Lasix and HCTZ. Patient has a chronic draining wound left lower extremity and has had difficulty walking up the stairs and was unable to ambulate this morning. \" 
                
RUR Score:     Low, 17% Plan for utilizing home health:      TBD 
 
PCP: First and Last name:  Dr. Marisel Walker Name of Practice: Family Medicine Are you a current patient: Yes/No:  
 Approximate date of last visit:  
 Can you participate in a virtual visit with your PCP:  
                 
Current Advanced Directive/Advance Care Plan:  Full code, no ACP on record Transition of Care Plan:        Care management will follow for discharge needs.

## 2021-01-14 PROBLEM — S98.111A AMPUTATION OF RIGHT GREAT TOE (HCC): Status: ACTIVE | Noted: 2021-01-01

## 2021-01-14 PROBLEM — E11.621 DIABETIC ULCER OF RIGHT FOOT ASSOCIATED WITH TYPE 2 DIABETES MELLITUS (HCC): Status: ACTIVE | Noted: 2021-01-01

## 2021-01-14 PROBLEM — E87.6 HYPOKALEMIA: Status: ACTIVE | Noted: 2021-01-01

## 2021-01-14 PROBLEM — N18.30 STAGE 3 CHRONIC KIDNEY DISEASE (HCC): Status: ACTIVE | Noted: 2021-01-01

## 2021-01-14 PROBLEM — L97.519 DIABETIC ULCER OF RIGHT FOOT ASSOCIATED WITH TYPE 2 DIABETES MELLITUS (HCC): Status: ACTIVE | Noted: 2021-01-01

## 2021-01-14 NOTE — ROUTINE PROCESS
Written shift change report given to [address]  (oncoming nurse) by Baljit Morel RN  (offgoing nurse). Report included the following information SBAR, Kardex, Procedure Summary, Intake/Output, MAR and Recent Results.

## 2021-01-14 NOTE — PROGRESS NOTES
Hospitalist Progress Note-critical care note Patient: Kamila Flores MRN: [id number]  CSN: [id number] YOB: 1940  Age: 79 y.o. Sex: male DOA: 1/13/2021 LOS:  LOS: 1 day Chief complaint: Hypokalemia, toe amputated, diabetes foot ulcer DM CHF cellulitis Assessment/Plan Hospital Problems  Date Reviewed: 1/13/2021 Codes Class Noted POA Hypokalemia ICD-10-CM: E87.6 ICD-9-CM: 276.8  1/14/2021 Unknown Amputation of right great toe [facility]) ICD-10-CM: Y97.621E ICD-9-CM: 895.0  1/14/2021 Unknown Diabetic ulcer of right foot associated with type 2 diabetes mellitus ([facility] ICD-10-CM: E11.621, D04.698 ICD-9-CM: 250.80, 707.15  1/14/2021 Unknown Stage 3 chronic kidney disease ICD-10-CM: N18.30 ICD-9-CM: 585.3  1/14/2021 Unknown Lymphedema ICD-10-CM: I89.0 ICD-9-CM: 457.1  1/13/2021 Unknown Acute CHF (congestive heart failure) (HCC) ICD-10-CM: I50.9 ICD-9-CM: 428.0  1/13/2021 Unknown * (Principal) Cellulitis and abscess of leg, except foot ICD-10-CM: L03.119, L02.419 ICD-9-CM: 682.6  1/13/2013 Yes HTN (hypertension) ICD-10-CM: I10 
ICD-9-CM: 401.9  1/13/2013 Yes Pt was admitted for sob and suspected chf exacerbation, also leg cellilitis and possible toe OM Acute CHF (congestive heart failure) ([facility] (1/13/2021) Sob better, echo still pending IV lasix, removed 0.9 L Hold ARB due to worsening Creatine Cardiac enzymes Lymphedema (1/13/2021) No dvt elevated leg , low albumin -will give some albumin will help the swelling Cellulitis and abscess of leg, except foot (1/13/2013) IV Zosyn/Vanc Pharmacy dosing Wound care consult 
  
    
Diabetic Foot Ulcer with some discharge from amputated toes site Mri done , suspected OM Continue local wound care Dr. Gabbi Pickens is consulted  
  
PAF Continue Eloquis , so far rate controlled per atenolol  
  
ckd3 Cr 1.65 on 4/2/19 from [place] data Continue monitoring cr  
 
DM type II Continue ssi Subjective : I feel fine, sob better Disposition :tbd, Review of systems: 
 
General: No fevers or chills. Cardiovascular: No chest pain or pressure. No palpitations. Pulmonary: No shortness of breath. Gastrointestinal: No nausea, vomiting. Vital signs/Intake and Output: 
Visit Vitals BP (!) 166/84 (BP 1 Location: Left arm, BP Patient Position: At rest) Pulse 99 Temp 97.7 °F (36.5 °C) Resp 18 Ht 6' (1.829 m) Wt 100.7 kg (222 lb) SpO2 100% BMI 30.11 kg/m² Current Shift:  01/14 0701 - 01/14 1900 In: 100 [I.V.:100] Out: - Last three shifts:  01/12 1901 - 01/14 0700 In: -  
Out: 248 [IRDJB:257] Physical Exam: 
General: WD, WN. Alert, cooperative, no acute distress HEENT: NC, Atraumatic. PERRLA, anicteric sclerae. Lungs: CTA Bilaterally. No Wheezing/Rhonchi/Rales. Heart:  Regular  rhythm,  No murmur, No Rubs, No Gallops Abdomen: Soft, Non distended, Non tender. +Bowel sounds, Extremities: No c/c. Skin lesion noted on bilateral legs , left worsen than rt, missed toes noted on rt foot some discharge from two toes Psych:   Not anxious or agitated. Neurologic:  No acute neurological deficit. Labs: Results:  
   
Chemistry Recent Labs  
  01/14/21 
0130 01/13/21 
1021 * 145*  136  
K 3.1* 3.3*  
 101 CO2 26 23 BUN 42* 43* CREA 1.67* 1.62* CA 8.2* 8.3* AGAP 10 12 BUCR 25* 27* * 133* TP 6.5 6.7 ALB 2.4* 2.6*  
GLOB 4.1* 4.1* AGRAT 0.6* 0.6* CBC w/Diff Recent Labs  
  01/14/21 
0130 01/13/21 
1021 WBC 14.2* 13.0  
RBC 2.95* 2.82* HGB 8.5* 8.1* HCT 26.8* 25.9*  
 317 GRANS 84* 84* LYMPH 7* 5* EOS 0 1 Cardiac Enzymes Recent Labs  
  01/14/21 
0130 01/13/21 
2110  101 CKND1 2.9 3.5 Coagulation Recent Labs  
  01/13/21 
1021 PTP 20.2* INR 1.8* APTT 44.9*  
   
 Lipid Panel No results found for: CHOL, CHOLPOCT, CHOLX, CHLST, CHOLV, 680253, HDL, HDLP, LDL, LDLC, DLDLP, 759068, VLDLC, VLDL, TGLX, TRIGL, TRIGP, TGLPOCT, CHHD, CHHDX  
BNP No results for input(s): BNPP in the last 72 hours. Liver Enzymes Recent Labs  
  01/14/21 
0130 TP 6.5 ALB 2.4* * Thyroid Studies No results found for: T4, T3U, TSH, TSHEXT, TSHEXT Procedures/imaging: see electronic medical records for all procedures/Xrays and details which were not copied into this note but were reviewed prior to creation of Plan Mri Foot Rt Wo Cont Result Date: 1/14/2021 Procedure: MRI of the right forefoot without contrast. HISTORY: -From Provider: diabetic ulcer -Additional: Ulcer distal plantar right foot Comparisons: None Technique: Sagittal and coronal T1 and STIR; axial T1 and T2 with fat saturation sequences of the forefoot were obtained. Intravenous gadolinium was not requested or obtained. The study is motion degraded even with repeat attempt, which does limit sensitivity. Subtle diagnostic for clinical question. Findings: 2nd digit: There is heterogeneous soft tissue thickening surrounding the distal aspect of the head of the 2nd metatarsal, with surrounding foci of low signal intensity, suspicious for gas, largest dorsal lateral to the 2nd metatarsal head. There is adjacent heterogeneously T1 hypointense, T2/STIR hyperintense thickening in the subcutaneous fat, extending to the skin margin of the 2nd digit, at the site of suspected dressing. This may correspond to the site of the ulcer, but should be clinically correlated. There is diffuse fatty change in the intrinsic foot musculature, with focally heterogeneously hyperintense STIR signal in the soft tissues surrounding the 2nd metatarsal diaphysis. Indistinct cortical margin of the 2nd metatarsal head, subjacent patchy T1 hypointense STIR and T2 hyperintense marrow signal in the head and distal diaphysis of the 2nd metatarsal. 1st digit: Amputation of the phalanges distal to the 1st metatarsal head. Cartilage loss and subchondral marrow hyperintensity in the medial cuneiform subjacent to the 1st tarsal metatarsal joint and mild spurring at the 1st metatarsal-sesamoid joint. The 1st and 2nd phalanges are absent, suggesting prior amputation. There is thickening of the subcutaneous fat and skin at the plantar aspect of the 2nd and 3rd MTP joints. There is skin puckering at the plantar aspect of the foot between the 3rd and 4th MTP joints.  There is dorsiflexion at the MTP and plantarflexion at the DIP joints of the 3rd-5th digits. No acute associated marrow signal abnormalities are seen Impression: Images significant degraded by motion artifact. 1. Findings suspicious for osteomyelitis in the head and neck of the 2nd metatarsal, with surrounding cellulitis. Recommend correlation with clinical and laboratory findings. 2. Foci of low signal intensity in the soft tissues about the head of the 2nd metatarsal are suspicious for gas, and can be seen in the setting of gas forming infection or recent intervention. 3. Edema/myositis in the fatty replaced intrinsic foot musculature about the 2nd metatarsal 4. Plantar subcutaneous fat and skin thickening at the 1st and 2nd metatarsal heads, which can be related to cellulitis or chronic changes. Skin puckering at the plantar 3rd-4th metatarsal interspace is age indeterminate, and can reflect chronic or acute ulcerative etiology. 5. Presumed amputation of the 1st and 2nd phalanges distal to the metatarsal heads. Degenerative changes at the 1st tarsal metatarsal and 1st metatarsal sesamoid joint. Ct Head Wo Cont Result Date: 1/13/2021 EXAM: CT HEAD, WITHOUT IV CONTRAST INDICATION: Lethargy, headache, bilateral lower extremity swelling COMPARISON: None TECHNIQUE: Multiple axial CT images of the head were obtained extending from the skull base through the vertex. Additional coronal and sagittal reformations were also performed. One or more dose reduction techniques were used on this CT: automated exposure control, adjustment of the mAs and/or kVp according to patient size, and iterative reconstruction techniques. The specific techniques used on this CT exam have been documented in the patient's electronic medical record. Digital Imaging and Communications in Medicine (DICOM) format image data are available to nonaffiliated external healthcare facilities or entities on a secure, media free, reciprocally searchable basis with patient authorization for at least a 12-month period after this study. _______________ FINDINGS: BRAIN AND POSTERIOR FOSSA: There are a few small, probably chronic right and left basal ganglia lacunar infarcts. There is generalized prominence of the ventricular system, associated with proportional widening of the cortical cerebral sulci, compatible with generalized volume loss. Hazy hypoattenuation identified along the periventricular white matter, a nonspecific finding which is most commonly encountered in the setting of chronic microvascular disease. There is no intracranial hemorrhage, mass effect, or midline shift. There are no significant additional areas of abnormal parenchymal attenuation. EXTRA-AXIAL SPACES AND MENINGES: There are no abnormal extra-axial fluid collections. CALVARIUM: No acute osseous abnormality. OTHER: The visualized portions of the paranasal sinuses and mastoid air cells are clear. _______________ IMPRESSION: 1. No CT evidence of an acute intracranial abnormality - in particular, no acute cortical infarct, hemorrhage, or mass effect. 2.  Mild cerebral atrophy/volume loss and periventricular white matter changes, most commonly seen with chronic microvascular disease. Xr Chest [facility] Result Date: 1/13/2021 EXAM: XR CHEST PORT CLINICAL INDICATION/HISTORY: gen weakness -Additional: None COMPARISON: 1/8/2021 TECHNIQUE: Portable frontal view of the chest _______________ FINDINGS: SUPPORT DEVICES: None. HEART AND MEDIASTINUM: Cardiomediastinal silhouette within normal limits. LUNGS AND PLEURAL SPACES: Hypoinflated lungs. No dense consolidation, large effusion or pneumothorax. _______________ IMPRESSION: No acute cardiopulmonary abnormality. Xr Chest [facility] Result Date: 1/8/2021 EXAM: XR CHEST PORT CLINICAL INDICATION/HISTORY: Chest pain -Additional: None COMPARISON: 11/17/2014 TECHNIQUE: Portable frontal view of the chest _______________ FINDINGS: SUPPORT DEVICES: None. HEART AND MEDIASTINUM: Cardiomegaly. LUNGS AND PLEURAL SPACES: No dense consolidation, large effusion or pneumothorax. _______________ IMPRESSION: Cardiomegaly. No acute radiographic abnormality. Duplex Lower Ext Venous Bilat Result Date: 1/13/2021 · No evidence of deep vein thrombosis in the right lower extremity veins assessed. · No evidence of deep vein thrombosis in the left lower extremity veins assessed. Patency of mid to distal femoral vein bilaterally is done by the color flow and doppler signal only Duplex Lower Ext Artery Bilat Result Date: 1/14/2021 No evidence of any significant peripheral arterial disease in the bilateral lower extremity. Triphasic waveforms noted throughout.  
 
 
Maryjane Torres MD

## 2021-01-14 NOTE — PROGRESS NOTES
1618 Bedside and Verbal shift change report given to Mitch Perez RN (oncoming nurse) by Adina Quintanilla RN (offgoing nurse). Report included the following information SBAR, MAR, Accordion and Cardiac Rhythm a fib.

## 2021-01-14 NOTE — PROGRESS NOTES
2300-Bedside and Verbal shift change report received from [address] (oncoming nurse)  to 86 Joesph Toney (offgoing nurse). Report included the following information SBAR, Kardex, Intake/Output, MAR, Recent Results, Med Rec Status, and Cardiac Rhythm Afib.   
 
0054- Pt had not received Potassium 10 meq for 4 runs. Reordered for pt to receive. 0130-  notified about order time change. 7330-2049: The pt rested well overnight with no complaints. No new clinical issues. Night shift chart check completed

## 2021-01-14 NOTE — PROGRESS NOTES
D/C Plan: [facility] vs SNF 
 
CM met with pt at bedside to discuss transition of care. Pt is  and lives with his wife. Pt has a RW, cane, rollator, wheel chair and is having a stair lift installed on Monday. CM discussed transition of care options to include [facility] vs SNF. Pt would prefer to return home with [facility] but is open to SNF if needed. Pt has indicated he has used HH in the past but could not recall the name of the agency. Pt has given permission for CM to contact his wife, Anna Sky ( [phone] cell; [phone] home). CM contacted pt's wife and reviewed the above. Mrs. Shelby has indicated pt has used [facility] in the past and they would like to use this agency again if [facility] is needed. Pt has a podiatry consult pending. CM to await outcome of podiatry consult to further assist with transition of care. CM to continue to follow and assist. 
 
Care Management Interventions Transition of Care Consult (CM Consult): Discharge Planning Health Maintenance Reviewed: Yes Physical Therapy Consult: Yes Current Support Network: Lives with Spouse Confirm Follow Up Transport: Family The Plan for Transition of Care is Related to the Following Treatment Goals : [facility] vs SNF The Patient and/or Patient Representative was Provided with a Choice of Provider and Agrees with the Discharge Plan?: Yes Name of the Patient Representative Who was Provided with a Choice of Provider and Agrees with the Discharge Plan: pt/spouse Freedom of Choice List was Provided with Basic Dialogue that Supports the Patient's Individualized Plan of Care/Goals, Treatment Preferences and Shares the Quality Data Associated with the Providers?: Yes Discharge Location Discharge Placement: Home with home health([facility] vs SNF)

## 2021-01-14 NOTE — ROUTINE PROCESS
Bedside and Verbal shift change report given to Radha[address] [place] (oncoming nurse) by Alison Gill RN (offgoing nurse). Report included the following information SBAR, Kardex, Procedure Summary, Intake/Output, MAR, and Cardiac Rhythm Afib.

## 2021-01-14 NOTE — PROGRESS NOTES
Problem: Mobility Impaired (Adult and Pediatric) Goal: *Acute Goals and Plan of Care (Insert Text) Description: Physical Therapy Goals Initiated 1/14/2021 and to be accomplished within 7 day(s) 1. Patient will move from supine <> sit with SBA in prep for out of bed activity and change of position. 2.  Patient will perform sit<> stand with CGA/min A/RW in prep for transfers/ambulation. 3.  Patient will transfer from bed <> chair with CGA/min A/RW for time up in chair for completion of ADL activity. 4.  Patient will ambulate 50 feet with CGA/MIN A/RW for improved functional mobility at discharge. 5.  Patient will ascend/descend 3-5 stairs with handrail(s) with minimal assistance/contact guard assist for home re-entry as needed. Outcome: Progressing Towards Goal 
 
PHYSICAL THERAPY EVALUATION Patient: Jia Vanegas (79 y.o. male) Date: 1/14/2021 Primary Diagnosis: Acute CHF (congestive heart failure) ([facility] [I50.9] Lymphedema [I89.0] Precautions:   Fall PLOF: lives with wife in 2 story home. Previously able to amb with rollator, nego stairs inside/out home with at least 1 HR.  H/o lymphedema alex MALAVE'S 
 
ASSESSMENT : 
 Based on the objective data described below, the patient is an 78 yo M seen on telemetry unit following admission for dx above. Pt presents with extensive LE edema, BLE weakness, limitations in overall functional mobility due to same and with  decreased activity tolerance. Pt with R great and fourth toe amputations. Sensory impairment bilat mid/ distal LL and B feet. Pt also with bilat LE's weeping fluid, especially from ant tibial region L (h/o bilat TKR\"S. Pt rolls S<>S with mod/max assist to LE's due to wt of limb/weakness. Bed pad changed during turn; pt given gown for use over personal maisha shirt and linen changed. Pt moved up in bed with [place] mattress and left in NAD with all needs in reach. Sitting EOB and transfers deferred at this time d/t LE's weeping and pt awaiting podiatry consult. Reports no pain during session. All needs in reach, and nurse Hua Colin notified of above. Recommend SNF for follow up physical therapy upon discharge to reach maximal level of independence/safety with functional mobility. Pt Education: Role of physical therapy in acute care setting, fall prevention and safety/technique during functional mobility tasks Patient will benefit from skilled intervention to address the above impairments. Patients rehabilitation potential is considered to be Fair Factors which may influence rehabilitation potential include:  
[]         None noted 
[]         Mental ability/status [x]         Medical condition 
[x]         Home/family situation and support systems 
[]         Safety awareness 
[]         Pain tolerance/management 
[]         Other: PLAN : 
Recommendations and Planned Interventions: 
[x]           Bed Mobility Training             []    Neuromuscular Re-Education 
[x]           Transfer Training                   []    Orthotic/Prosthetic Training 
[x]           Gait Training                          []    Modalities [x]           Therapeutic Exercises          []    Edema Management/Control 
[x]           Therapeutic Activities            [x]    Patient and Family Training/Education 
[]           Other (comment): Frequency/Duration: Patient will be followed by physical therapy 1-2 times per day to address goals. Discharge Recommendations: Anup Stovall Further Equipment Recommendations for Discharge: TBD SUBJECTIVE:  
Patient stated I just got back from a test. OBJECTIVE DATA SUMMARY:  
 
Past Medical History:  
Diagnosis Date  
 ARF (acute renal failure) ([facility] 6/3/2013 Arrhythmia A-Fib Arthritis Atrial fibrillation ([facility] Cellulitis Hypertension Unspecified sleep apnea   
 recently notified doesn't need c-pap Past Surgical History:  
Procedure Laterality Date HX CATARACT REMOVAL    
 HX ORTHOPAEDIC    
 bilateral knee replacments HX ORTHOPAEDIC  2017  
 betancourt procedure Barriers to Learning/Limitations: yes;  physical 
Compensate with: Verbal Cues Prior Level of Function/Home Situation:  
Home Situation Home Environment: Private residence # Steps to Enter: 3 Rails to Enter: Yes Hand Rails : Bilateral 
One/Two Story Residence: Two story # of Interior Steps: 15 Interior Rails: Right Lift Chair Available: No(being installed on Monday 1/18/2021) Living Alone: No 
Support Systems: Spouse/Significant Other/Partner Patient Expects to be Discharged to[de-identified] Private residence Current DME Used/Available at Home: Walker, rollator, Walker, rolling, Raised toilet seat, Grab bars Critical Behavior: 
Neurologic State: Alert; Appropriate for age Orientation Level: Oriented X4 Cognition: Follows commands; Appropriate for age attention/concentration; Appropriate decision making; Appropriate safety awareness Safety/Judgement: Awareness of environment; Fall prevention Psychosocial 
Patient Behaviors: Calm; Cooperative Purposeful Interaction: Yes 
 Pt Identified Daily Priority: Clinical issues (comment) Ruma Process: Nurture loving kindness;Establish trust;Teaching/learning; Attend basic human needs Caring Interventions: Therapeutic modalities; Reassure Reassure: Therapeutic listening; Informing;Caring rounds Therapeutic Modalities: Intentional therapeutic touch;Humor Skin Condition/Temp: Inflamed; Warm 
Skin Integrity: Estrada Dafpablo Skin Integumentary Skin Color: Red Skin Condition/Temp: Inflamed; Warm 
Skin Integrity: Estrada Daft Hair Growth: Sparce Varicosities: Absent Nails: Within Defined Limits Strength:   
Strength: Generally decreased, functional 
Tone & Sensation:  
Tone: Normal 
Sensation: Impaired Range Of Motion: 
AROM: Generally decreased, functional 
PROM: Generally decreased, functional 
Functional Mobility: 
Bed Mobility: 
Rolling: Moderate assistance;Maximum assistance(assist to LE's) Right Side Weight Bearing: (TBD; pt awaiting podiatry consult) Pain: 
Pain Scale 1: Numeric (0 - 10) Pain Intensity 1: 0 Activity Tolerance:  
Fair Please refer to the flowsheet for vital signs taken during this treatment. After treatment:  
[]         Patient left in no apparent distress sitting up in chair 
[x]         Patient left in no apparent distress in bed 
[x]         Call bell left within reach [x]         Nursing notified-Carli 
[]         Caregiver present 
[]         Bed alarm activated COMMUNICATION/EDUCATION:  
[x]         Fall prevention education was provided and the patient/caregiver indicated understanding. [x]         Patient/family have participated as able in goal setting and plan of care. [x]         Patient/family agree to work toward stated goals and plan of care. []         Patient understands intent and goals of therapy, but is neutral about his/her participation. []         Patient is unable to participate in goal setting and plan of care. Eval Complexity: History: HIGH Complexity :3+ comorbidities / personal factors will impact the outcome/ POC Exam:MEDIUM Complexity : 3 Standardized tests and measures addressing body structure, function, activity limitation and / or participation in recreation  Presentation: MEDIUM Complexity : Evolving with changing characteristics  Clinical Decision Making:High Complexity    Overall Complexity:HIGH Thank you for this referral. 
Dayanna Aj, PT Time Calculation: 27 mins

## 2021-01-14 NOTE — CONSULTS
History & Physical 
 
 
Patient: Lionel Fraga. Sex: male          DOA: 1/13/2021 YOB: 1940      Age:  79 y.o.        LOS:  LOS: 1 day Subjective:  
Lionel Drew is a 79 y.o. male  who I was consulted to see for infection of the R foot. He does not recall when it began. Only that it slowly got worse and started to drain. No complaints of pain. Medications:  
 
Current Facility-Administered Medications:  
  albumin human 25% (BUMINATE) solution 25 g, 25 g, IntraVENous, Q6H, Keshia Ugarte MD 
  latanoprost (XALATAN) 0.005 % ophthalmic solution 1 Drop, 1 Drop, Both Eyes, QPM, Keshia Ugarte MD 
  losartan (COZAAR) tablet 25 mg, 25 mg, Oral, DAILY, Keshia Ugarte MD, 25 mg at 01/14/21 1731 
  insulin lispro (HUMALOG) injection, , SubCUTAneous, AC&HS, Keshia Ugarte MD 
  glucose chewable tablet 16 g, 4 Tab, Oral, PRN, Keshia Ugarte MD 
  glucagon [facility]) injection 1 mg, 1 mg, IntraMUSCular, PRN, Keshia Ugarte MD 
  dextrose 10% infusion 125-250 mL, 125-250 mL, IntraVENous, PRN, Keshia Ugarte MD 
  sodium chloride (NS) flush 5-10 mL, 5-10 mL, IntraVENous, PRN, Casey Cornejo DO 
  apixaban (ELIQUIS) tablet 2.5 mg, 2.5 mg, Oral, BID, Sussy Albert MD, 2.5 mg at 01/14/21 0830   atenoloL (TENORMIN) tablet 50 mg, 50 mg, Oral, QPM, Sussy Albert MD, 50 mg at 01/13/21 1840   furosemide (LASIX) injection 80 mg, 80 mg, IntraVENous, Q12H, Sussy Albert MD, 80 mg at 01/14/21 0830   potassium chloride (KLOR-CON) tablet 10 mEq, 10 mEq, Oral, BID, Sussy Albert MD, 10 mEq at 01/14/21 0830   piperacillin-tazobactam (ZOSYN) 3.375 g in 0.9% sodium chloride (MBP/ADV) 100 mL MBP, 3.375 g, IntraVENous, Q6H, Sussy Albert MD, Last Rate: 200 mL/hr at 01/14/21 1353, 3.375 g at 01/14/21 1353   sodium chloride (NS) flush 5-40 mL, 5-40 mL, IntraVENous, Q8H, Sussy Albert MD, 10 mL at 01/13/21 1800   sodium chloride (NS) flush 5-40 mL, 5-40 mL, IntraVENous, PRN, Rick Albert MD 
  acetaminophen (TYLENOL) tablet 650 mg, 650 mg, Oral, Q6H PRN **OR** acetaminophen (TYLENOL) suppository 650 mg, 650 mg, Rectal, Q6H PRN, Rick Albert MD 
  polyethylene glycol (MIRALAX) packet 17 g, 17 g, Oral, DAILY PRN, Rick Albert MD 
  promethazine (PHENERGAN) tablet 12.5 mg, 12.5 mg, Oral, Q6H PRN **OR** ondansetron (ZOFRAN) injection 4 mg, 4 mg, IntraVENous, Q6H PRN, Rick Albert MD 
  Vancomycin - Pharmacy to Dose, 1 Each, Other, Rx Dosing/Monitoring, Rick Albert MD 
  [START ON 1/15/2021] vancomycin (VANCOCIN) 1750 mg in  ml infusion, 1,750 mg, IntraVENous, Q24H, Rick Albert MD 
 
Review of Systems Negative for chest pain and shortness of breath Objective:  
  
Visit Vitals BP (!) 144/87 (BP 1 Location: Left arm, BP Patient Position: At rest) Pulse 94 Temp 97.9 °F (36.6 °C) Resp 18 Ht 6' (1.829 m) Wt 100.7 kg (222 lb) SpO2 100% BMI 30.11 kg/m² Physical Exam: 
Draining wound of the R foot Exposed bone No soft tissue crepitus Labs: 
Recent Results (from the past 24 hour(s)) CULTURE, BLOOD Collection Time: 01/13/21  9:00 PM  
 Specimen: Blood Result Value Ref Range Special Requests: NO SPECIAL REQUESTS Culture result: NO GROWTH AFTER 12 HOURS    
CULTURE, BLOOD Collection Time: 01/13/21  9:10 PM  
 Specimen: Blood Result Value Ref Range Special Requests: NO SPECIAL REQUESTS Culture result: NO GROWTH AFTER 12 HOURS    
CARDIAC PANEL,(CK, CKMB & TROPONIN) Collection Time: 01/13/21  9:10 PM  
Result Value Ref Range CK - MB 3.5 <3.6 ng/ml CK-MB Index 3.5 0.0 - 4.0 %  39 - 308 U/L Troponin-I, QT 0.02 0.0 - 9.409 NG/ML  
METABOLIC PANEL, COMPREHENSIVE Collection Time: 01/14/21  1:30 AM  
Result Value Ref Range  Sodium 136 136 - 145 mmol/L  
 Potassium 3.1 (L) 3.5 - 5.5 mmol/L Chloride 100 100 - 111 mmol/L  
 CO2 26 21 - 32 mmol/L Anion gap 10 3.0 - 18 mmol/L Glucose 132 (H) 74 - 99 mg/dL BUN 42 (H) 7.0 - 18 MG/DL Creatinine 1.67 (H) 0.6 - 1.3 MG/DL  
 BUN/Creatinine ratio 25 (H) 12 - 20 GFR est AA 48 (L) >60 ml/min/1.73m2 GFR est non-AA 40 (L) >60 ml/min/1.73m2 Calcium 8.2 (L) 8.5 - 10.1 MG/DL Bilirubin, total 0.8 0.2 - 1.0 MG/DL  
 ALT (SGPT) 26 16 - 61 U/L  
 AST (SGOT) 34 10 - 38 U/L Alk. phosphatase 137 (H) 45 - 117 U/L Protein, total 6.5 6.4 - 8.2 g/dL Albumin 2.4 (L) 3.4 - 5.0 g/dL Globulin 4.1 (H) 2.0 - 4.0 g/dL A-G Ratio 0.6 (L) 0.8 - 1.7    
CBC WITH AUTOMATED DIFF Collection Time: 01/14/21  1:30 AM  
Result Value Ref Range WBC 14.2 (H) 4.6 - 13.2 K/uL  
 RBC 2.95 (L) 4.70 - 5.50 M/uL HGB 8.5 (L) 13.0 - 16.0 g/dL HCT 26.8 (L) 36.0 - 48.0 % MCV 90.8 74.0 - 97.0 FL  
 MCH 28.8 24.0 - 34.0 PG  
 MCHC 31.7 31.0 - 37.0 g/dL  
 RDW 17.7 (H) 11.6 - 14.5 % PLATELET 857 482 - 550 K/uL MPV 7.8 (L) 9.2 - 11.8 FL  
 NEUTROPHILS 84 (H) 40 - 73 % LYMPHOCYTES 7 (L) 21 - 52 % MONOCYTES 9 3 - 10 % EOSINOPHILS 0 0 - 5 % BASOPHILS 0 0 - 2 %  
 ABS. NEUTROPHILS 12.0 (H) 1.8 - 8.0 K/UL  
 ABS. LYMPHOCYTES 0.9 0.9 - 3.6 K/UL  
 ABS. MONOCYTES 1.3 (H) 0.05 - 1.2 K/UL  
 ABS. EOSINOPHILS 0.0 0.0 - 0.4 K/UL  
 ABS. BASOPHILS 0.0 0.0 - 0.1 K/UL  
 DF AUTOMATED MAGNESIUM Collection Time: 01/14/21  1:30 AM  
Result Value Ref Range Magnesium 1.9 1.6 - 2.6 mg/dL CARDIAC PANEL,(CK, CKMB & TROPONIN) Collection Time: 01/14/21  1:30 AM  
Result Value Ref Range CK - MB 2.9 <3.6 ng/ml CK-MB Index 2.9 0.0 - 4.0 %  39 - 308 U/L Troponin-I, QT 0.03 0.0 - 0.045 NG/ML  
ECHO ADULT COMPLETE Collection Time: 01/14/21 11:11 AM  
Result Value Ref Range IVSd 1.42 (A) 0.6 - 1.0 cm LVIDd 4.64 4.2 - 5.9 cm  LVIDs 3.62 cm  
 LVOT d 2.15 cm  
 LVPWd 1.28 (A) 0.6 - 1.0 cm  
 BP EF 43.0 (A) 55 - 100 % LV Ejection Fraction MOD 2C 43 % LV Ejection Fraction MOD 4C 45 % LV ED Vol A2C 93.9 mL  
 LV ED Vol A4C 74.4 mL  
 LV ED Vol BP 87.9 67 - 155 mL  
 LV ES Vol A2C 53.6 mL  
 LV ES Vol A4C 41.1 mL  
 LV ES Vol BP 50.1 22 - 58 mL  
 LVOT SV 72.8 ml  
 LVOT Peak Gradient 2.5 mmHg Left Ventricular Outflow Tract Mean Gradient 2.4608253187 mmHg LVOT SV 72.8 mL  
 LVOT Peak Velocity 79.18 cm/s LVOT VTI 20.03 cm RVIDd 3.79 cm Left Atrium Major Axis 4.00 cm LA Volume 76.07 18 - 58 mL  
 LA Vol 2C 80.62 (A) 18 - 58 mL  
 LA Vol 4C 59.46 (A) 18 - 58 mL AoV PG 4.3 mmHg Aortic Valve Systolic Mean Gradient 2.2 mmHg Aortic Valve Systolic Peak Velocity 053.50 cm/s AoV VTI 21.67 cm Aortic Valve Area by Continuity of Peak Velocity 2.8 cm2 Aortic Valve Area by Continuity of VTI 3.4 cm2 MR Peak Gradient 56.9 mmHg TV MG 42.35 mmHg Pulmonic Regurgitant End Max Velocity 377.26 cm/s Mitral Regurgitant Velocity Time Integral 103.38 cm  
 AO ASC D 3.72 cm Ao Root D 3.23 cm IVC proximal 1.64 cm  
 LV E' Septal Velocity 6.00 cm/s LV E' Lateral Velocity 8.00 cm/s  
 MV E Pavel 144.00 cm/s Left Atrium to Aortic Root Ratio 1.24 Inferior Vena Cava Diameter Sniffing 1.64 cm  
 LV Mass .5 (A) 88 - 224 g LV Mass AL Index 110.7 49 - 115 g/m2 E/E' lateral 18.00   
 E/E' septal 24.00   
 E/E' ratio (averaged) 21.00   
 LVES Vol Index BP 22.5 mL/m2 LVED Vol Index BP 39.5 mL/m2 Mitral Regurgitant Peak Velocity 377.26 cm/s Left Atrium Minor Axis 1.80 cm Tapse 2.40 1.5 - 2.0 cm  
 LA Vol Index 34.16 16 - 28 ml/m2 LA Vol Index 36.20 16 - 28 ml/m2 LA Vol Index 26.70 16 - 28 ml/m2 LVED Vol Index A4C 33.4 mL/m2 LVED Vol Index A2C 42.2 mL/m2 LVES Vol Index A4C 18.5 mL/m2 LVES Vol Index A2C 24.1 mL/m2 EMBER/BSA Pk Pavel 1.3 cm2/m2 EMBER/BSA VTI 1.5 cm2/m2 Left Ventricular Fractional Shortening by 2D 24.871751451 % Left Ventricular Outflow Tract Mean Velocity 0.5362599069 cm/s Mitral valve mean inflow velocity 3.6278350009 m/s AV Velocity Ratio 0.77 AV VTI Ratio 0.9 Aortic valve mean velocity 3.8711837925 m/s Left Ventricular End Diastolic Volume by Teichholz Method 0.5730791113 mL Left Ventricular End Systolic Volume by Teichholz Method 7.1850727208 mL Left Ventricular Stroke Volume by 2-D Biplane-MOD 50.573585008 mL Left Ventricular Stroke Volume by 2-D Single Plane- MOD 96.229781036 mL Left Ventricular Stroke Volume by Teichholz Method 98.62362614 mL Left Ventricular Stroke Volume by 2-D Single Plane- MOD 08.542890227 mL  
DUPLEX LOWER EXT ARTERY BILAT Collection Time: 01/14/21 11:22 AM  
Result Value Ref Range Right CFA dist sys PSV 96.5 cm/s Right CFA prox sys .7 cm/s Right super femoral dist sys .5 cm/s Right super femoral mid sys .9 cm/s Right super femoral prox sys PSV 99.1 cm/s Right Prox PFA A PSV 49.8 cm/s Right popliteal dist sys PSV 88.6 cm/s Right popliteal prox sys PSV 65.3 cm/s Right Dist PTA .1 cm/s Right Dist KIMBERLYN Velocity 78.2 cm/s Left CFA dist sys PSV 73.0 cm/s Left CFA prox sys .3 cm/s Left super femoral dist sys PSV 85.9 cm/s Left super femoral mid sys .8 cm/s Left super femoral prox sys .6 cm/s Left Prox PFA A PSV 73.0 cm/s Left popliteal dist sys .7 cm/s Left popliteal prox sys PSV 85.9 cm/s Left Dist PTA PSV 46.1 cm/s Left Dist KIMBERLYN Velocity 30.0 cm/s Right SFA Prox Pavel Ratio 0.8 Right SFA Mid Pavel Ratio 1.4 Right SFA Dist Pavel Ratio 0.83 Right Pop A Prox Pavel Ratio 0.57 Left SFA Prox Pavel Ratio 0.98 Left SFA Mid Pavel Ratio 1.22 Left SFA Dist Pavel Ratio 0.66 Left Pop A Prox Pavel Ratio 1.00 Assessment/Plan Principal Problem: 
  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 Recommended incision and drainage of the right foot with debridement. Will likely require TMA and wound closure next week. Risk, benefits, and alternatives have been discussed at length with the patient. He understands risk of continued infection, wound healing complications and the need for future surgery including the loss of limb. He has good perfusion to the foot however so I am optimistic he will heal.  We will move forward with surgery tomorrow. NPO after midnight and hold anticoagulants. Neil Villalobos, MAURI 
January 14, 2021

## 2021-01-14 NOTE — PROGRESS NOTES
Problem: Falls - Risk of 
Goal: *Absence of Falls Description: Document Reganyas Alejandra Fall Risk and appropriate interventions in the flowsheet. Outcome: Progressing Towards Goal 
Note: Fall Risk Interventions: 
Mobility Interventions: Communicate number of staff needed for ambulation/transfer Elimination Interventions: Call light in reach History of Falls Interventions: Investigate reason for fall Problem: Patient Education: Go to Patient Education Activity Goal: Patient/Family Education Outcome: Progressing Towards Goal 
  
Problem: Pressure Injury - Risk of 
Goal: *Prevention of pressure injury Description: Document Po Scale and appropriate interventions in the flowsheet. Outcome: Progressing Towards Goal 
Note: Pressure Injury Interventions: 
  
 
Moisture Interventions: Absorbent underpads Activity Interventions: Pressure redistribution bed/mattress(bed type) Mobility Interventions: HOB 30 degrees or less Nutrition Interventions: Document food/fluid/supplement intake Friction and Shear Interventions: HOB 30 degrees or less Problem: Patient Education: Go to Patient Education Activity Goal: Patient/Family Education Outcome: Progressing Towards Goal

## 2021-01-14 NOTE — H&P (VIEW-ONLY)
History & Physical 
 
 
Patient: Ezra Lemus Sex: male          DOA: 1/13/2021 YOB: 1940      Age:  79 y.o.        LOS:  LOS: 1 day Subjective:  
Ezra Lemus is a 79 y.o. male  who I was consulted to see for infection of the R foot. He does not recall when it began. Only that it slowly got worse and started to drain. No complaints of pain. Medications:  
 
Current Facility-Administered Medications:  
  albumin human 25% (BUMINATE) solution 25 g, 25 g, IntraVENous, Q6H, Minus MD Lou 
  latanoprost (XALATAN) 0.005 % ophthalmic solution 1 Drop, 1 Drop, Both Eyes, QPM, Minus MD Lou 
  losartan (COZAAR) tablet 25 mg, 25 mg, Oral, DAILY, Minus MD Lou, 25 mg at 01/14/21 1731 
  insulin lispro (HUMALOG) injection, , SubCUTAneous, AC&HS, Minus MD Lou 
  glucose chewable tablet 16 g, 4 Tab, Oral, PRN, Minus MD Lou 
  glucagon [facility]) injection 1 mg, 1 mg, IntraMUSCular, PRN, Minus MD Lou 
  dextrose 10% infusion 125-250 mL, 125-250 mL, IntraVENous, PRN, Minus MD Lou 
  sodium chloride (NS) flush 5-10 mL, 5-10 mL, IntraVENous, PRN, Seth Cornejo Sheets, DO 
  apixaban (ELIQUIS) tablet 2.5 mg, 2.5 mg, Oral, BID, Wilfredo Albert MD, 2.5 mg at 01/14/21 0830   atenoloL (TENORMIN) tablet 50 mg, 50 mg, Oral, QPM, Wilfredo Albert MD, 50 mg at 01/13/21 1840   furosemide (LASIX) injection 80 mg, 80 mg, IntraVENous, Q12H, Wilfredo Albert MD, 80 mg at 01/14/21 0830   potassium chloride (KLOR-CON) tablet 10 mEq, 10 mEq, Oral, BID, Wilfredo Albert MD, 10 mEq at 01/14/21 0830   piperacillin-tazobactam (ZOSYN) 3.375 g in 0.9% sodium chloride (MBP/ADV) 100 mL MBP, 3.375 g, IntraVENous, Q6H, Wilfredo Albert MD, Last Rate: 200 mL/hr at 01/14/21 1353, 3.375 g at 01/14/21 1353   sodium chloride (NS) flush 5-40 mL, 5-40 mL, IntraVENous, Q8H, Wilfredo Albert MD, 10 mL at 01/13/21 1800   sodium chloride (NS) flush 5-40 mL, 5-40 mL, IntraVENous, PRN, Jose Antonio Albert MD 
  acetaminophen (TYLENOL) tablet 650 mg, 650 mg, Oral, Q6H PRN **OR** acetaminophen (TYLENOL) suppository 650 mg, 650 mg, Rectal, Q6H PRN, Jose Antonio Albert MD 
  polyethylene glycol (MIRALAX) packet 17 g, 17 g, Oral, DAILY PRN, Jose Antonio Albert MD 
  promethazine (PHENERGAN) tablet 12.5 mg, 12.5 mg, Oral, Q6H PRN **OR** ondansetron (ZOFRAN) injection 4 mg, 4 mg, IntraVENous, Q6H PRN, Jose Antonio Albert MD 
  Vancomycin - Pharmacy to Dose, 1 Each, Other, Rx Dosing/Monitoring, Jose Antonio Albert MD 
  [START ON 1/15/2021] vancomycin (VANCOCIN) 1750 mg in  ml infusion, 1,750 mg, IntraVENous, Q24H, Jose Antonio Albert MD 
 
Review of Systems Negative for chest pain and shortness of breath Objective:  
  
Visit Vitals BP (!) 144/87 (BP 1 Location: Left arm, BP Patient Position: At rest) Pulse 94 Temp 97.9 °F (36.6 °C) Resp 18 Ht 6' (1.829 m) Wt 100.7 kg (222 lb) SpO2 100% BMI 30.11 kg/m² Physical Exam: 
Draining wound of the R foot Exposed bone No soft tissue crepitus Labs: 
Recent Results (from the past 24 hour(s)) CULTURE, BLOOD Collection Time: 01/13/21  9:00 PM  
 Specimen: Blood Result Value Ref Range Special Requests: NO SPECIAL REQUESTS Culture result: NO GROWTH AFTER 12 HOURS    
CULTURE, BLOOD Collection Time: 01/13/21  9:10 PM  
 Specimen: Blood Result Value Ref Range Special Requests: NO SPECIAL REQUESTS Culture result: NO GROWTH AFTER 12 HOURS    
CARDIAC PANEL,(CK, CKMB & TROPONIN) Collection Time: 01/13/21  9:10 PM  
Result Value Ref Range CK - MB 3.5 <3.6 ng/ml CK-MB Index 3.5 0.0 - 4.0 %  39 - 308 U/L Troponin-I, QT 0.02 0.0 - 4.903 NG/ML  
METABOLIC PANEL, COMPREHENSIVE Collection Time: 01/14/21  1:30 AM  
Result Value Ref Range  Sodium 136 136 - 145 mmol/L  
 Potassium 3.1 (L) 3.5 - 5.5 mmol/L Chloride 100 100 - 111 mmol/L  
 CO2 26 21 - 32 mmol/L Anion gap 10 3.0 - 18 mmol/L Glucose 132 (H) 74 - 99 mg/dL BUN 42 (H) 7.0 - 18 MG/DL Creatinine 1.67 (H) 0.6 - 1.3 MG/DL  
 BUN/Creatinine ratio 25 (H) 12 - 20 GFR est AA 48 (L) >60 ml/min/1.73m2 GFR est non-AA 40 (L) >60 ml/min/1.73m2 Calcium 8.2 (L) 8.5 - 10.1 MG/DL Bilirubin, total 0.8 0.2 - 1.0 MG/DL  
 ALT (SGPT) 26 16 - 61 U/L  
 AST (SGOT) 34 10 - 38 U/L Alk. phosphatase 137 (H) 45 - 117 U/L Protein, total 6.5 6.4 - 8.2 g/dL Albumin 2.4 (L) 3.4 - 5.0 g/dL Globulin 4.1 (H) 2.0 - 4.0 g/dL A-G Ratio 0.6 (L) 0.8 - 1.7    
CBC WITH AUTOMATED DIFF Collection Time: 01/14/21  1:30 AM  
Result Value Ref Range WBC 14.2 (H) 4.6 - 13.2 K/uL  
 RBC 2.95 (L) 4.70 - 5.50 M/uL HGB 8.5 (L) 13.0 - 16.0 g/dL HCT 26.8 (L) 36.0 - 48.0 % MCV 90.8 74.0 - 97.0 FL  
 MCH 28.8 24.0 - 34.0 PG  
 MCHC 31.7 31.0 - 37.0 g/dL  
 RDW 17.7 (H) 11.6 - 14.5 % PLATELET 572 478 - 188 K/uL MPV 7.8 (L) 9.2 - 11.8 FL  
 NEUTROPHILS 84 (H) 40 - 73 % LYMPHOCYTES 7 (L) 21 - 52 % MONOCYTES 9 3 - 10 % EOSINOPHILS 0 0 - 5 % BASOPHILS 0 0 - 2 %  
 ABS. NEUTROPHILS 12.0 (H) 1.8 - 8.0 K/UL  
 ABS. LYMPHOCYTES 0.9 0.9 - 3.6 K/UL  
 ABS. MONOCYTES 1.3 (H) 0.05 - 1.2 K/UL  
 ABS. EOSINOPHILS 0.0 0.0 - 0.4 K/UL  
 ABS. BASOPHILS 0.0 0.0 - 0.1 K/UL  
 DF AUTOMATED MAGNESIUM Collection Time: 01/14/21  1:30 AM  
Result Value Ref Range Magnesium 1.9 1.6 - 2.6 mg/dL CARDIAC PANEL,(CK, CKMB & TROPONIN) Collection Time: 01/14/21  1:30 AM  
Result Value Ref Range CK - MB 2.9 <3.6 ng/ml CK-MB Index 2.9 0.0 - 4.0 %  39 - 308 U/L Troponin-I, QT 0.03 0.0 - 0.045 NG/ML  
ECHO ADULT COMPLETE Collection Time: 01/14/21 11:11 AM  
Result Value Ref Range IVSd 1.42 (A) 0.6 - 1.0 cm LVIDd 4.64 4.2 - 5.9 cm  LVIDs 3.62 cm  
 LVOT d 2.15 cm  
 LVPWd 1.28 (A) 0.6 - 1.0 cm  
 BP EF 43.0 (A) 55 - 100 % LV Ejection Fraction MOD 2C 43 % LV Ejection Fraction MOD 4C 45 % LV ED Vol A2C 93.9 mL  
 LV ED Vol A4C 74.4 mL  
 LV ED Vol BP 87.9 67 - 155 mL  
 LV ES Vol A2C 53.6 mL  
 LV ES Vol A4C 41.1 mL  
 LV ES Vol BP 50.1 22 - 58 mL  
 LVOT SV 72.8 ml  
 LVOT Peak Gradient 2.5 mmHg Left Ventricular Outflow Tract Mean Gradient 5.4194956901 mmHg LVOT SV 72.8 mL  
 LVOT Peak Velocity 79.18 cm/s LVOT VTI 20.03 cm RVIDd 3.79 cm Left Atrium Major Axis 4.00 cm LA Volume 76.07 18 - 58 mL  
 LA Vol 2C 80.62 (A) 18 - 58 mL  
 LA Vol 4C 59.46 (A) 18 - 58 mL AoV PG 4.3 mmHg Aortic Valve Systolic Mean Gradient 2.2 mmHg Aortic Valve Systolic Peak Velocity 061.60 cm/s AoV VTI 21.67 cm Aortic Valve Area by Continuity of Peak Velocity 2.8 cm2 Aortic Valve Area by Continuity of VTI 3.4 cm2 MR Peak Gradient 56.9 mmHg TV MG 42.35 mmHg Pulmonic Regurgitant End Max Velocity 377.26 cm/s Mitral Regurgitant Velocity Time Integral 103.38 cm  
 AO ASC D 3.72 cm Ao Root D 3.23 cm IVC proximal 1.64 cm  
 LV E' Septal Velocity 6.00 cm/s LV E' Lateral Velocity 8.00 cm/s  
 MV E Pavel 144.00 cm/s Left Atrium to Aortic Root Ratio 1.24 Inferior Vena Cava Diameter Sniffing 1.64 cm  
 LV Mass .5 (A) 88 - 224 g LV Mass AL Index 110.7 49 - 115 g/m2 E/E' lateral 18.00   
 E/E' septal 24.00   
 E/E' ratio (averaged) 21.00   
 LVES Vol Index BP 22.5 mL/m2 LVED Vol Index BP 39.5 mL/m2 Mitral Regurgitant Peak Velocity 377.26 cm/s Left Atrium Minor Axis 1.80 cm Tapse 2.40 1.5 - 2.0 cm  
 LA Vol Index 34.16 16 - 28 ml/m2 LA Vol Index 36.20 16 - 28 ml/m2 LA Vol Index 26.70 16 - 28 ml/m2 LVED Vol Index A4C 33.4 mL/m2 LVED Vol Index A2C 42.2 mL/m2 LVES Vol Index A4C 18.5 mL/m2 LVES Vol Index A2C 24.1 mL/m2 EMBER/BSA Pk Pavel 1.3 cm2/m2 EMBER/BSA VTI 1.5 cm2/m2 Left Ventricular Fractional Shortening by 2D 41.840000231 % Left Ventricular Outflow Tract Mean Velocity 9.9753296056 cm/s Mitral valve mean inflow velocity 3.1966880410 m/s AV Velocity Ratio 0.77 AV VTI Ratio 0.9 Aortic valve mean velocity 3.1354639619 m/s Left Ventricular End Diastolic Volume by Teichholz Method 7.3730756921 mL Left Ventricular End Systolic Volume by Teichholz Method 9.0079574555 mL Left Ventricular Stroke Volume by 2-D Biplane-MOD 03.457735488 mL Left Ventricular Stroke Volume by 2-D Single Plane- MOD 65.779104078 mL Left Ventricular Stroke Volume by Teichholz Method 93.24882507 mL Left Ventricular Stroke Volume by 2-D Single Plane- MOD 09.503494193 mL  
DUPLEX LOWER EXT ARTERY BILAT Collection Time: 01/14/21 11:22 AM  
Result Value Ref Range Right CFA dist sys PSV 96.5 cm/s Right CFA prox sys .7 cm/s Right super femoral dist sys .5 cm/s Right super femoral mid sys .9 cm/s Right super femoral prox sys PSV 99.1 cm/s Right Prox PFA A PSV 49.8 cm/s Right popliteal dist sys PSV 88.6 cm/s Right popliteal prox sys PSV 65.3 cm/s Right Dist PTA .1 cm/s Right Dist KIMBERLYN Velocity 78.2 cm/s Left CFA dist sys PSV 73.0 cm/s Left CFA prox sys .3 cm/s Left super femoral dist sys PSV 85.9 cm/s Left super femoral mid sys .8 cm/s Left super femoral prox sys .6 cm/s Left Prox PFA A PSV 73.0 cm/s Left popliteal dist sys .7 cm/s Left popliteal prox sys PSV 85.9 cm/s Left Dist PTA PSV 46.1 cm/s Left Dist KIMBERLYN Velocity 30.0 cm/s Right SFA Prox Pavel Ratio 0.8 Right SFA Mid Pavel Ratio 1.4 Right SFA Dist Pavel Ratio 0.83 Right Pop A Prox Pavel Ratio 0.57 Left SFA Prox Pavel Ratio 0.98 Left SFA Mid Pavel Ratio 1.22 Left SFA Dist Pavel Ratio 0.66 Left Pop A Prox Pavel Ratio 1.00 Assessment/Plan Principal Problem: 
  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 Recommended incision and drainage of the right foot with debridement. Will likely require TMA and wound closure next week. Risk, benefits, and alternatives have been discussed at length with the patient. He understands risk of continued infection, wound healing complications and the need for future surgery including the loss of limb. He has good perfusion to the foot however so I am optimistic he will heal.  We will move forward with surgery tomorrow. NPO after midnight and hold anticoagulants. Mavis Wright DPM 
January 14, 2021

## 2021-01-15 NOTE — PROGRESS NOTES
D/C Plan: SNF Noted therapy recommendation for SNF placement. CM met with pt to discuss transition of care. Noted pt with a COVID order placed 1/13/2021. CM notified nursing staff pt will have to a COVID swab. Anticipate pt will remain inpt through the weekend. CM will contact pt's wife to further discuss transition of care. CM to continue to follow and assist.  
 
1500: 
CM to contacted pt's wife, Yasmani Rodriguez ( [phone] cell; [phone] home) to discuss transition of care. Pt's wife is agreeable to having clinical information sent to area facilities to see what is available and she would like to have the pt select the facility based on availability. Anticipate pt will remain inpt through the weekend. CM to continue to follow and assist. 
 
Care Management Interventions Transition of Care Consult (CM Consult): Discharge Planning Health Maintenance Reviewed: Yes Physical Therapy Consult: Yes Current Support Network: Lives with Spouse Confirm Follow Up Transport: Family The Plan for Transition of Care is Related to the Following Treatment Goals : [facility] The Patient and/or Patient Representative was Provided with a Choice of Provider and Agrees with the Discharge Plan?: Yes Name of the Patient Representative Who was Provided with a Choice of Provider and Agrees with the Discharge Plan: pt/spouse Freedom of Choice List was Provided with Basic Dialogue that Supports the Patient's Individualized Plan of Care/Goals, Treatment Preferences and Shares the Quality Data Associated with the Providers?: Yes Discharge Location Discharge Placement: Home with home health([facility])

## 2021-01-15 NOTE — BRIEF OP NOTE
Brief Postoperative Note Patient: Jack Gorman. YOB: 1940 MRN: [id number] Date of Procedure: 1/15/2021 Pre-Op Diagnosis: Right Foot infection Post-Op Diagnosis: Same as preoperative diagnosis. Procedure(s): RIGHT FOOT IRRIGATION AND DEBRIDEMENT; POSSIBLE TRANSMETATARSAL AMPUTATION Surgeon(s): 
Idalia Dumont DPM 
 
Surgical Assistant: None Anesthesia: General  
 
Estimated Blood Loss (mL): Minimal 
 
Complications: None Specimens: Bone and culture Implants: * No implants in log * Drains: * No LDAs found * Findings: Necrotic bone Electronically Signed by Marlon Brewer DPM on 1/15/2021 at 3:27 PM

## 2021-01-15 NOTE — ACP (ADVANCE CARE PLANNING)
Mr. Mary Beth Martinez executed an Advance Medical Directive appointing his wife Maine Cerrato primary then daughter Scottie Salazar secondary. He hasn't had conversations regarding end of life wishes with his wife and would like to do so before documenting. Mr. Mary Beth Martinez will stay FULL code and FULL interventions. Code status: FULL code MPOA: Maine Cerrato (wife) [phone] 
 
2nd MPOA: Scottie Salazar (daughter) [phone]

## 2021-01-15 NOTE — PROGRESS NOTES
Progress Note Patient: Gladis Lee. Sex: male          DOA: 1/13/2021 YOB: 1940      Age:  79 y.o.        LOS:  LOS: 2 days Subjective:  
Patient seen today for follow up. Incision and drainage today Medications:  
 
Current Facility-Administered Medications:  
  piperacillin-tazobactam (ZOSYN) 2.25 g in 0.9% sodium chloride (MBP/ADV) 50 mL MBP, 2.25 g, IntraVENous, Q6H, Rosalie Albert MD, 2.25 g at 01/15/21 1532   lactated Ringers infusion, 125 mL/hr, IntraVENous, CONTINUOUS, Julia, Edenilson Marsh DPM, Last Rate: 125 mL/hr at 01/15/21 1416, New Bag at 01/15/21 1502   [START ON 1/16/2021] vancomycin (VANCOCIN) 1250 mg in  ml infusion, 1,250 mg, IntraVENous, Q24H, Rosalie Albert MD 
  latanoprost (XALATAN) 0.005 % ophthalmic solution 1 Drop, 1 Drop, Both Eyes, QPM, Luis Ferraro MD, 1 Drop at 01/14/21 1800 
  losartan (COZAAR) tablet 25 mg, 25 mg, Oral, DAILY, Luis Ferraro MD, 25 mg at 01/15/21 0859 
  insulin lispro (HUMALOG) injection, , SubCUTAneous, AC&HS, Luis Ferraro MD, Stopped at 01/15/21 0730 
  glucose chewable tablet 16 g, 4 Tab, Oral, PRN, Luis Ferraro MD 
  glucagon [facility]) injection 1 mg, 1 mg, IntraMUSCular, PRN, Luis Ferraro MD 
  dextrose 10% infusion 125-250 mL, 125-250 mL, IntraVENous, PRN, Luis Ferraro MD 
  sodium chloride (NS) flush 5-10 mL, 5-10 mL, IntraVENous, PRN, Kristy Cornejo,  
  apixaban (ELIQUIS) tablet 2.5 mg, 2.5 mg, Oral, BID, Rosalie Albert MD, Stopped at 01/15/21 0900   atenoloL (TENORMIN) tablet 50 mg, 50 mg, Oral, QPM, Rosalie Albert MD, 50 mg at 01/14/21 1759   potassium chloride (KLOR-CON) tablet 10 mEq, 10 mEq, Oral, BID, Rosalie Albert MD, 10 mEq at 01/15/21 0901   sodium chloride (NS) flush 5-40 mL, 5-40 mL, IntraVENous, Q8H, Rosalie Albert MD, 10 mL at 01/14/21 1400   sodium chloride (NS) flush 5-40 mL, 5-40 mL, IntraVENous, PRN, Bassem Albert MD 
  acetaminophen (TYLENOL) tablet 650 mg, 650 mg, Oral, Q6H PRN **OR** acetaminophen (TYLENOL) suppository 650 mg, 650 mg, Rectal, Q6H PRN, Bassem Albert MD 
  polyethylene glycol (MIRALAX) packet 17 g, 17 g, Oral, DAILY PRN, Bassem Albert MD 
  promethazine (PHENERGAN) tablet 12.5 mg, 12.5 mg, Oral, Q6H PRN **OR** ondansetron (ZOFRAN) injection 4 mg, 4 mg, IntraVENous, Q6H PRN, Bassem Albert MD 
  Vancomycin - Pharmacy to Dose, 1 Each, Other, Rx Dosing/Monitoring, Bassem Albert MD 
 
Facility-Administered Medications Ordered in Other Encounters:  
  lidocaine (PF) (XYLOCAINE) 20 mg/mL (2 %) injection, , IntraVENous, PRN, Dione Barros MD, 40 mg at 01/15/21 0057   propofoL (DIPRIVAN) 10 mg/mL injection, , IntraVENous, PRN, Dione Barros MD, 120 mg at 01/15/21 1509 
  fentaNYL citrate (PF) injection, , IntraVENous, PRN, Dione Barros MD, 50 mcg at 01/15/21 1516 Review of Systems Negative for chest pain and shortness of breath Objective:  
  
Visit Vitals BP (!) 142/85 (BP 1 Location: Right arm, BP Patient Position: At rest) Pulse 96 Temp 97.4 °F (36.3 °C) Resp 18 Ht 6' (1.829 m) Wt 100.7 kg (222 lb) SpO2 99% BMI 30.11 kg/m² Labs: 
Recent Results (from the past 24 hour(s)) GLUCOSE, POC Collection Time: 01/14/21  9:45 PM  
Result Value Ref Range Glucose (POC) 153 (H) 70 - 110 mg/dL METABOLIC PANEL, COMPREHENSIVE Collection Time: 01/15/21  3:35 AM  
Result Value Ref Range Sodium 139 136 - 145 mmol/L Potassium 3.3 (L) 3.5 - 5.5 mmol/L Chloride 103 100 - 111 mmol/L  
 CO2 25 21 - 32 mmol/L Anion gap 11 3.0 - 18 mmol/L Glucose 93 74 - 99 mg/dL BUN 48 (H) 7.0 - 18 MG/DL Creatinine 2.15 (H) 0.6 - 1.3 MG/DL  
 BUN/Creatinine ratio 22 (H) 12 - 20 GFR est AA 36 (L) >60 ml/min/1.73m2 GFR est non-AA 30 (L) >60 ml/min/1.73m2 Calcium 8.3 (L) 8.5 - 10.1 MG/DL Bilirubin, total 1.1 (H) 0.2 - 1.0 MG/DL  
 ALT (SGPT) 25 16 - 61 U/L  
 AST (SGOT) 35 10 - 38 U/L Alk. phosphatase 120 (H) 45 - 117 U/L Protein, total 6.7 6.4 - 8.2 g/dL Albumin 2.9 (L) 3.4 - 5.0 g/dL Globulin 3.8 2.0 - 4.0 g/dL A-G Ratio 0.8 0.8 - 1.7    
CBC WITH AUTOMATED DIFF Collection Time: 01/15/21  3:35 AM  
Result Value Ref Range WBC 15.5 (H) 4.6 - 13.2 K/uL  
 RBC 2.86 (L) 4.70 - 5.50 M/uL HGB 8.3 (L) 13.0 - 16.0 g/dL HCT 26.1 (L) 36.0 - 48.0 % MCV 91.3 74.0 - 97.0 FL  
 MCH 29.0 24.0 - 34.0 PG  
 MCHC 31.8 31.0 - 37.0 g/dL  
 RDW 18.0 (H) 11.6 - 14.5 % PLATELET 024 906 - 144 K/uL MPV 7.6 (L) 9.2 - 11.8 FL  
 NEUTROPHILS 82 (H) 40 - 73 % LYMPHOCYTES 8 (L) 21 - 52 % MONOCYTES 9 3 - 10 % EOSINOPHILS 1 0 - 5 % BASOPHILS 0 0 - 2 %  
 ABS. NEUTROPHILS 12.8 (H) 1.8 - 8.0 K/UL  
 ABS. LYMPHOCYTES 1.2 0.9 - 3.6 K/UL  
 ABS. MONOCYTES 1.4 (H) 0.05 - 1.2 K/UL  
 ABS. EOSINOPHILS 0.1 0.0 - 0.4 K/UL  
 ABS. BASOPHILS 0.0 0.0 - 0.1 K/UL  
 DF AUTOMATED MAGNESIUM Collection Time: 01/15/21  3:35 AM  
Result Value Ref Range Magnesium 2.0 1.6 - 2.6 mg/dL GLUCOSE, POC Collection Time: 01/15/21  6:20 AM  
Result Value Ref Range Glucose (POC) 105 70 - 110 mg/dL GLUCOSE, POC Collection Time: 01/15/21  1:42 PM  
Result Value Ref Range Glucose (POC) 131 (H) 70 - 110 mg/dL Assessment/Plan Principal Problem: 
  Cellulitis and abscess of leg, except foot (1/13/2013) Active Problems: 
  HTN (hypertension) (1/13/2013) Lymphedema (1/13/2021) Acute CHF (congestive heart failure) ([facility] (1/13/2021) Hypokalemia (1/14/2021) Amputation of right great toe (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 [address] (1/14/2021) Stage 3 chronic kidney disease (1/14/2021) Patient seen and evaluated today. Recommended trans met amputation next week once the infection is under control.

## 2021-01-15 NOTE — INTERVAL H&P NOTE
Update History & Physical 
 
The Patient's History and Physical of January 21, 2021 was reviewed with the patient and I examined the patient. There was no change. The surgical site was confirmed by the patient and me. Plan:  The risk, benefits, expected outcome, and alternative to the recommended procedure have been discussed with the patient. Patient understands and wants to proceed with the procedure.  
 
Electronically signed by Gabbie Patel DPM on 1/15/2021 at 2:33 PM

## 2021-01-15 NOTE — PROGRESS NOTES
[facility] at this time. Patient alert and oriented x3. Denies SOB, chest pain. Shows no signs of distress. Patient lungs clear bilaterally. Cap refill < 3 sec to all extremities. Patient has 20 G IV to R hand CDI. Stated pain 0/10. Bowel sounds present. Weeping edema to BLE with pitting 4+. Patient call light and possessions within reach. Bed locked and in lowest position. Nurse will continue to monitor. [address] Patient received from PACU after surgery. Reassessment performed. New surgical wound present. Dressing intact with minimal sanguineous breakthrough drainage. 1800 Paged for diet order. 1806 Per Dr. Arleen Ordaz, telephone with readback regular diet, percocet 1 tablet 5-325 Q4-6 Hrs prn, non weight bearing R foot. [facility] Patient alert and oriented x4. Patient had uneventful shift. Patient denies pain. Patient has not voided post op. Night nurse aware.

## 2021-01-15 NOTE — ROUTINE PROCESS
[address] [facility] TRANSFER - OUT REPORT: 
 
Verbal report given to Maria Guadalupe Werner RN(name) on [employer].  being transferred to OR (unit) for ordered procedure Report consisted of patients Situation, Background, Assessment and  
Recommendations(SBAR). Information from the following report(s) SBAR, Kardex, Intake/Output and MAR was reviewed with the receiving nurse. Lines:  
Peripheral IV 01/13/21 Right Hand (Active) Site Assessment Clean, dry, & intact 01/15/21 1351 Phlebitis Assessment 0 01/15/21 1351 Infiltration Assessment 0 01/15/21 1351 Dressing Status Clean, dry, & intact 01/15/21 1351 Dressing Type Transparent 01/15/21 1351 Hub Color/Line Status Pink;Capped;Flushed 01/15/21 0438 Action Taken Open ports on tubing capped 01/15/21 0438 Alcohol Cap Used Yes 01/15/21 3904 Peripheral IV 01/14/21 Right Forearm (Active) Site Assessment Clean, dry, & intact 01/15/21 1237 Phlebitis Assessment 0 01/15/21 0438 Infiltration Assessment 0 01/15/21 0438 Dressing Status Clean, dry, & intact 01/15/21 3705 Dressing Type Transparent;Tape 01/15/21 0438 Hub Color/Line Status Blue;Capped;Flushed 01/15/21 0438 Action Taken Open ports on tubing capped 01/15/21 0438 Alcohol Cap Used Yes 01/15/21 1088 Opportunity for questions and clarification was provided. Patient transported with: 
 [facility]

## 2021-01-15 NOTE — WOUND CARE
Consult received, pt for TMA with Dr. Cruz Hendricks, please consult wound care if assistance with dressing changes required, follow surgeons orders for wound dressings and do not remove surgical dressing without surgeons order.

## 2021-01-15 NOTE — PERIOP NOTES
TRANSFER - OUT REPORT: 
 
Verbal report given to SLAVA Toussaint (name) on Edilma Serrano.  being transferred to [facility](unit) for routine post - op Report consisted of patients Situation, Background, Assessment and  
Recommendations(SBAR). Information from the following report(s) SBAR, Kardex, OR Summary, MAR and Cardiac Rhythm a fib  was reviewed with the receiving nurse. Lines:  
Peripheral IV 01/14/21 Right Other(comment) (Active) Site Assessment Clean, dry, & intact 01/15/21 1622 Phlebitis Assessment 0 01/15/21 1622 Infiltration Assessment 0 01/15/21 1622 Dressing Status Clean, dry, & intact 01/15/21 1622 Dressing Type Tape;Transparent 01/15/21 1622 Hub Color/Line Status Pink; Infusing;Patent 01/15/21 1622 Action Taken Open ports on tubing capped 01/15/21 0438 Alcohol Cap Used Yes 01/15/21 1416 Opportunity for questions and clarification was provided. Patient transported with: 
 O2 @ 2 liters Registered Nurse Tech

## 2021-01-15 NOTE — PROGRESS NOTES
Palliative Medicine [facility] [phone] DR. [facility] [phone] Goals of care have been established as FULL code and FULL aggressive care. Patient executed an AMD to appoint his wife and daughter. No further palliative interventions needed at this time. Will sign off. Thank you for the opportunity to assist in the care of Mr. Rylee Herrera. THEA Chandler, APHSW-C Palliative Medicine

## 2021-01-15 NOTE — PERIOP NOTES
TRANSFER - IN REPORT: 
 
Verbal report received from SLAVA Toussaint(name) on [employer].  [facility] Report consisted of patients Situation, Background, Assessment and  
Recommendations(SBAR). Information from the following report(s) SBAR and MAR was reviewed with the receiving nurse. Opportunity for questions and clarification was provided. Assessment completed upon patients arrival to unit and care assumed.

## 2021-01-15 NOTE — ANESTHESIA PREPROCEDURE EVALUATION
Relevant Problems RESPIRATORY SYSTEM  
(+) Unspecified sleep apnea CARDIOVASCULAR  
(+) Acute CHF (congestive heart failure) (HCC)  
(+) HTN (hypertension) RENAL FAILURE  
(+) ARF (acute renal failure) (HCC)  
(+) Stage 3 chronic kidney disease Anesthetic History No history of anesthetic complications Review of Systems / Medical History Patient summary reviewed, nursing notes reviewed and pertinent labs reviewed Pulmonary Sleep apnea Neuro/Psych Cardiovascular Hypertension CHF Dysrhythmias : atrial fibrillation Comments: On eliquis and ASA Admitted two days ago, dx of CHF Echo shows EF of 45-50% GI/Hepatic/Renal 
  
 
 
 
 
 
 Endo/Other Diabetes Arthritis Other Findings Physical Exam 
 
Airway Mallampati: II 
TM Distance: 4 - 6 cm Neck ROM: normal range of motion Cardiovascular Rhythm: irregular Rate: abnormal 
 
 
 
 Dental 
 
Dentition: Caps/crowns Pulmonary Breath sounds clear to auscultation Abdominal 
GI exam deferred Other Findings Comments: Bilateral leg edema- significant Anesthetic Plan ASA: 4 Anesthesia type: general 
 
 
 
 
Induction: Intravenous Anesthetic plan and risks discussed with: Patient

## 2021-01-15 NOTE — PROGRESS NOTES
Hospitalist Progress Note-critical care note Patient: Jonathan Bennett MRN: [id number]  CSN: [id number] YOB: 1940  Age: 79 y.o. 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 
ICD-9-CM: 401.9  1/13/2013 Yes Pt was admitted for sob and suspected chf exacerbation, also leg cellilitis and possible toe OM Acute CHF (congestive heart failure) ([facility] (1/13/2021) No sob  echo with ef 40 % IV lasix, F/u with dr. Monica Nicole as out -pt Lymphedema (1/13/2021) No dvt elevated leg , low albumin Cellulitis and abscess of leg, except foot (1/13/2013) Mild improving IV Zosyn/Vanc Pharmacy dosing 
  
    
Diabetic Foot Ulcer with some discharge from amputated toes site Drain and debridement per Dr. Sukumar Schroeder today Continue local wound care Dr. Sukumar Schroeder is consulted  
  
PAF Continue Eloquis , so far rate controlled per atenolol  
  
ckd3 Elevated today  Will decrease lasix Continue monitoring cr  
 
DM type II Continue ssi Subjective : I  Will have surgery, no sob Disposition :tbd, Review of systems: 
 
General: No fevers or chills. Cardiovascular: No chest pain or pressure. No palpitations. Pulmonary: No shortness of breath. Gastrointestinal: No nausea, vomiting. Vital signs/Intake and Output: 
Visit Vitals /82 Pulse 89 Temp 97.2 °F (36.2 °C) Resp 18 Ht 6' (1.829 m) Wt 100.7 kg (222 lb) SpO2 100% BMI 30.11 kg/m² Current Shift:  01/15 0701 - 01/15 1900 In: 48 [I.V.:50] Out: - Last three shifts:  01/13 1901 - 01/15 0700 In: 100 [I.V.:100] Out: 1000 [Urine:1000] Physical Exam: 
General: WD, WN. Alert, cooperative, no acute distress HEENT: NC, Atraumatic. PERRLA, anicteric sclerae. Lungs: CTA Bilaterally. No Wheezing/Rhonchi/Rales. Heart:  Regular  rhythm,  No murmur, No Rubs, No Gallops Abdomen: Soft, Non distended, Non tender. +Bowel sounds, Extremities: No c/c. Skin lesion noted on bilateral legs , left worsen than rt, missed toes noted on rt foot some discharge from two toes Psych:   Not anxious or agitated. Neurologic:  No acute neurological deficit. Labs: Results:  
   
Chemistry Recent Labs  
  01/15/21 
0335 01/14/21 
0130 01/13/21 
1021 GLU 93 132* 145*  136 136  
K 3.3* 3.1* 3.3*  
 100 101 CO2 25 26 23 BUN 48* 42* 43* CREA 2.15* 1.67* 1.62* CA 8.3* 8.2* 8.3* AGAP 11 10 12 BUCR 22* 25* 27* * 137* 133* TP 6.7 6.5 6.7 ALB 2.9* 2.4* 2.6*  
GLOB 3.8 4.1* 4.1* AGRAT 0.8 0.6* 0.6* CBC w/Diff Recent Labs  
  01/15/21 
0335 01/14/21 
0130 01/13/21 
1021 WBC 15.5* 14.2* 13.0  
RBC 2.86* 2.95* 2.82* HGB 8.3* 8.5* 8.1* HCT 26.1* 26.8* 25.9*  
 352 317 GRANS 82* 84* 84* LYMPH 8* 7* 5* EOS 1 0 1 Cardiac Enzymes Recent Labs  
  01/14/21 
0130 01/13/21 
2110  101 CKND1 2.9 3.5 Coagulation Recent Labs  
  01/13/21 
1021 PTP 20.2* INR 1.8* APTT 44.9*  
   
 Lipid Panel No results found for: CHOL, CHOLPOCT, CHOLX, CHLST, CHOLV, 914423, HDL, HDLP, LDL, LDLC, DLDLP, 086383, VLDLC, VLDL, TGLX, TRIGL, TRIGP, TGLPOCT, CHHD, CHHDX  
BNP No results for input(s): BNPP in the last 72 hours. Liver Enzymes Recent Labs  
  01/15/21 
0335 TP 6.7 ALB 2.9*  
* Thyroid Studies No results found for: T4, T3U, TSH, TSHEXT, TSHEXT Procedures/imaging: see electronic medical records for all procedures/Xrays and details which were not copied into this note but were reviewed prior to creation of Plan Mri Foot Rt Wo Cont Result Date: 1/14/2021 Procedure: MRI of the right forefoot without contrast. HISTORY: -From Provider: diabetic ulcer -Additional: Ulcer distal plantar right foot Comparisons: None Technique: Sagittal and coronal T1 and STIR; axial T1 and T2 with fat saturation sequences of the forefoot were obtained. Intravenous gadolinium was not requested or obtained. The study is motion degraded even with repeat attempt, which does limit sensitivity. Subtle diagnostic for clinical question. Findings: 2nd digit: There is heterogeneous soft tissue thickening surrounding the distal aspect of the head of the 2nd metatarsal, with surrounding foci of low signal intensity, suspicious for gas, largest dorsal lateral to the 2nd metatarsal head. There is adjacent heterogeneously T1 hypointense, T2/STIR hyperintense thickening in the subcutaneous fat, extending to the skin margin of the 2nd digit, at the site of suspected dressing. This may correspond to the site of the ulcer, but should be clinically correlated. There is diffuse fatty change in the intrinsic foot musculature, with focally heterogeneously hyperintense STIR signal in the soft tissues surrounding the 2nd metatarsal diaphysis. Indistinct cortical margin of the 2nd metatarsal head, subjacent patchy T1 hypointense STIR and T2 hyperintense marrow signal in the head and distal diaphysis of the 2nd metatarsal. 1st digit: Amputation of the phalanges distal to the 1st metatarsal head. Cartilage loss and subchondral marrow hyperintensity in the medial cuneiform subjacent to the 1st tarsal metatarsal joint and mild spurring at the 1st metatarsal-sesamoid joint. The 1st and 2nd phalanges are absent, suggesting prior amputation. There is thickening of the subcutaneous fat and skin at the plantar aspect of the 2nd and 3rd MTP joints. There is skin puckering at the plantar aspect of the foot between the 3rd and 4th MTP joints.  There is dorsiflexion at the MTP and plantarflexion at the DIP joints of the 3rd-5th digits. No acute associated marrow signal abnormalities are seen Impression: Images significant degraded by motion artifact. 1. Findings suspicious for osteomyelitis in the head and neck of the 2nd metatarsal, with surrounding cellulitis. Recommend correlation with clinical and laboratory findings. 2. Foci of low signal intensity in the soft tissues about the head of the 2nd metatarsal are suspicious for gas, and can be seen in the setting of gas forming infection or recent intervention. 3. Edema/myositis in the fatty replaced intrinsic foot musculature about the 2nd metatarsal 4. Plantar subcutaneous fat and skin thickening at the 1st and 2nd metatarsal heads, which can be related to cellulitis or chronic changes. Skin puckering at the plantar 3rd-4th metatarsal interspace is age indeterminate, and can reflect chronic or acute ulcerative etiology. 5. Presumed amputation of the 1st and 2nd phalanges distal to the metatarsal heads. Degenerative changes at the 1st tarsal metatarsal and 1st metatarsal sesamoid joint. Ct Head Wo Cont Result Date: 1/13/2021 EXAM: CT HEAD, WITHOUT IV CONTRAST INDICATION: Lethargy, headache, bilateral lower extremity swelling COMPARISON: None TECHNIQUE: Multiple axial CT images of the head were obtained extending from the skull base through the vertex. Additional coronal and sagittal reformations were also performed. One or more dose reduction techniques were used on this CT: automated exposure control, adjustment of the mAs and/or kVp according to patient size, and iterative reconstruction techniques. The specific techniques used on this CT exam have been documented in the patient's electronic medical record. Digital Imaging and Communications in Medicine (DICOM) format image data are available to nonaffiliated external healthcare facilities or entities on a secure, media free, reciprocally searchable basis with patient authorization for at least a 12-month period after this study. _______________ FINDINGS: BRAIN AND POSTERIOR FOSSA: There are a few small, probably chronic right and left basal ganglia lacunar infarcts. There is generalized prominence of the ventricular system, associated with proportional widening of the cortical cerebral sulci, compatible with generalized volume loss. Hazy hypoattenuation identified along the periventricular white matter, a nonspecific finding which is most commonly encountered in the setting of chronic microvascular disease. There is no intracranial hemorrhage, mass effect, or midline shift. There are no significant additional areas of abnormal parenchymal attenuation. EXTRA-AXIAL SPACES AND MENINGES: There are no abnormal extra-axial fluid collections. CALVARIUM: No acute osseous abnormality. OTHER: The visualized portions of the paranasal sinuses and mastoid air cells are clear. _______________ IMPRESSION: 1. No CT evidence of an acute intracranial abnormality - in particular, no acute cortical infarct, hemorrhage, or mass effect. 2.  Mild cerebral atrophy/volume loss and periventricular white matter changes, most commonly seen with chronic microvascular disease. Xr Chest [facility] Result Date: 1/13/2021 EXAM: XR CHEST PORT CLINICAL INDICATION/HISTORY: gen weakness -Additional: None COMPARISON: 1/8/2021 TECHNIQUE: Portable frontal view of the chest _______________ FINDINGS: SUPPORT DEVICES: None. HEART AND MEDIASTINUM: Cardiomediastinal silhouette within normal limits. LUNGS AND PLEURAL SPACES: Hypoinflated lungs. No dense consolidation, large effusion or pneumothorax. _______________ IMPRESSION: No acute cardiopulmonary abnormality. Xr Chest [facility] Result Date: 1/8/2021 EXAM: XR CHEST PORT CLINICAL INDICATION/HISTORY: Chest pain -Additional: None COMPARISON: 11/17/2014 TECHNIQUE: Portable frontal view of the chest _______________ FINDINGS: SUPPORT DEVICES: None. HEART AND MEDIASTINUM: Cardiomegaly. LUNGS AND PLEURAL SPACES: No dense consolidation, large effusion or pneumothorax. _______________ IMPRESSION: Cardiomegaly. No acute radiographic abnormality. Duplex Lower Ext Venous Bilat Result Date: 1/13/2021 · No evidence of deep vein thrombosis in the right lower extremity veins assessed. · No evidence of deep vein thrombosis in the left lower extremity veins assessed. Patency of mid to distal femoral vein bilaterally is done by the color flow and doppler signal only Duplex Lower Ext Artery Bilat Result Date: 1/14/2021 No evidence of any significant peripheral arterial disease in the bilateral lower extremity. Triphasic waveforms noted throughout.  
 
 
Maryjane Torres MD

## 2021-01-15 NOTE — PROGRESS NOTES
Problem: Falls - Risk of 
Goal: *Absence of Falls Description: Document Bailee Salgado Fall Risk and appropriate interventions in the flowsheet. Outcome: Progressing Towards Goal 
Note: Fall Risk Interventions: 
Mobility Interventions: Assess mobility with egress test, OT consult for ADLs, Patient to call before getting OOB, PT Consult for mobility concerns, PT Consult for assist device competence, Strengthening exercises (ROM-active/passive), Utilize walker, cane, or other assistive device Medication Interventions: Patient to call before getting OOB Elimination Interventions: Call light in reach, Patient to call for help with toileting needs History of Falls Interventions: Bed/chair exit alarm Problem: Patient Education: Go to Patient Education Activity Goal: Patient/Family Education Outcome: Progressing Towards Goal 
  
Problem: Pressure Injury - Risk of 
Goal: *Prevention of pressure injury Description: Document Po Scale and appropriate interventions in the flowsheet. Outcome: Progressing Towards Goal 
Note: Pressure Injury Interventions: 
  
 
Moisture Interventions: Apply protective barrier, creams and emollients Activity Interventions: Pressure redistribution bed/mattress(bed type) Mobility Interventions: Pressure redistribution bed/mattress (bed type) Nutrition Interventions: Document food/fluid/supplement intake Friction and Shear Interventions: Apply protective barrier, creams and emollients Problem: Patient Education: Go to Patient Education Activity Goal: Patient/Family Education Outcome: Progressing Towards Goal 
  
Problem: Patient Education: Go to Patient Education Activity Goal: Patient/Family Education Outcome: Progressing Towards Goal

## 2021-01-15 NOTE — ROUTINE PROCESS
[address] B [facility] TRANSFER - IN REPORT: 
 
Verbal report received from [address] on [employer].  being received from PACU for routine post - op Report consisted of patients Situation, Background, Assessment and  
Recommendations(SBAR). Information from the following report(s) SBAR, Kardex, OR Summary, Intake/Output, MAR, and Recent Results was reviewed with the receiving nurse. Opportunity for questions and clarification was provided. Assessment will be completed upon patients arrival to unit and care assumed.

## 2021-01-15 NOTE — ROUTINE PROCESS
Bedside and Verbal shift change report given Shruthi RN (oncoming nurse) by Angela Durbin RN (offgoing nurse). Report included the following information SBAR, Kardex, Procedure Summary, Intake/Output, MAR, and Cardiac Rhythm Afib.

## 2021-01-15 NOTE — CONSULTS
[facility]alliative Medicine [facility]: [phone]) [facility]: [phone] [facility]: [phone] Patient Name: Ezra Chavez. YOB: 1940 Date of Initial Consult: 1/15/2021 Reason for Consult: care decisions Requesting Provider: Roseann Sandoval MD  
Primary Care Physician: Ayleen Abel MD 
  
 SUMMARY:  
Ezra Lemus is a 79 y.o. male with a past history of A-fib, HTN, CKD, HLD, macular degeneration, sleep apnea, viral hepatitis who was admitted on 1/13/2021 from home with a diagnosis of exacerbation of CHF and cellulitis. Current medical issues leading to Palliative Medicine involvement include: CHF, vascular disease and goals of care. PALLIATIVE DIAGNOSES:  
1. Advanced care decisions 2. Exacerbation of CHF 3. Cellulitis 4. Debility PLAN:  
1. Advanced care decisions: Palliative care team including THEA Escobar and this NP met with patient at bedside. He is alert, oriented and appears able to make own healthcare decisions. He has never completed an AMD in the past. Patient is  with three daughters: Radha Ho and Martha Conti. After discussion with patient regarding AMD he agreed to designate decision makers on the form. He appointed his wife, Dejuan Reeder, as primary MPOA and his daughter, Martha Conti as back up. Patient did not wish to complete the information on scenarios as he and his wife have not had these discussions in the past. He avoided using words like death and dying in our conversation stating he preferred not to. Discussed benefits and risks of CPR in the event of cardiopulmonary arrest. Once again, patient shying away from discussion. Encouraged patient to have these discussions with his family. He was able to say he would not wish to remain on life support long term. GOALS OF CARE: FULL CODE with FULL INTERVENTIONS. 2. Exacerbation of CHF: presented to the ED with c/o increased weakness and swelling in bilateral lower extremities.  
3. Cellulitis: chronic lymphedema present. Has draining wound to his left lower extremity. Podiatry consulted: recommended incision and drainage of the right foot with debridement. 
4. Debility:  Patient uses wheelchair in home and a scooter outside of home. Has a stair climber scheduled for installation on Monday. Per his report he is able to stand for short periods of time.  
5. Initial consult note routed to primary continuity provider 
6. Communicated plan of care with: Palliative IDT 
 
GOALS OF CARE: 
Patient/Health Care Proxy Stated Goals: Rehabilitation 
 
 
TREATMENT PREFERENCES:  
Code Status: Full Code 
 
Advance Care Planning: 
Advance Care Planning 8/12/2019  
Confirm Advance Directive Yes, not on file  
 
 
Medical Interventions: Full interventions  
   
 
Other: As far as possible, the palliative care team has discussed with patient / health care proxy about goals of care / treatment preferences for patient. 
 
 HISTORY:  
 
History obtained from: chart 
 
CHIEF COMPLAINT: does not want to use bedpan. 
 
HPI/SUBJECTIVE:   
The patient is:  
[x] Verbal and participatory 
[] Non-participatory due to:  
Denies pain, nausea and shortness of breath.   
 
Clinical Pain Assessment (nonverbal scale for nonverbal patients): Clinical Pain Assessment 
Severity: 0    
  
 
Duration: for how long has pt been experiencing pain (e.g., 2 days, 1 month, years) 
Frequency: how often pain is an issue (e.g., several times per day, once every few days, constant) 
 
 FUNCTIONAL ASSESSMENT:  
 
Palliative Performance Scale (PPS): 
PPS: 50 
 
ECOG 
ECOG Status : Limited self-care 
 
 PSYCHOSOCIAL/SPIRITUAL SCREENING:  
  
Any spiritual / Mormonism concerns: 
[] Yes /  [x] No 
 
Caregiver Burnout: 
[] Yes /  [] No /  [x] No Caregiver Present   
 
Anticipatory grief assessment:  
 [x] Normal  / [] Maladaptive REVIEW OF SYSTEMS:  
 
Positive and pertinent negative findings in ROS are noted above in HPI. The following systems were [x] reviewed / [] unable to be reviewed as noted in HPI Other findings are noted below. Systems: constitutional, ears/nose/mouth/throat, respiratory, gastrointestinal, genitourinary, musculoskeletal, integumentary, neurologic, psychiatric, endocrine. Positive findings noted below. Modified ESAS Completed by: provider Pain: 0 Nausea: 0 Dyspnea: 0 PHYSICAL EXAM:  
 
Wt Readings from Last 3 Encounters:  
01/14/21 100.7 kg (222 lb)  
01/14/21 100.7 kg (222 lb) 01/08/21 102 kg (224 lb 13.9 oz) Blood pressure (!) 142/85, pulse 96, temperature 97.4 °F (36.3 °C), resp. rate 18, height 6' (1.829 m), weight 100.7 kg (222 lb), SpO2 99 %. Pain: 
Pain Scale 1: Numeric (0 - 10) Pain Intensity 1: 0 Constitutional: Elderly, obese male, sitting up in bed in no apparent distress Eyes: pupils equal, anicteric ENMT: no nasal discharge, moist mucous membranes Respiratory: breathing not labored Musculoskeletal: right great toe amputee, no tenderness to palpation Skin: warm, dry, bilateral lower extremity with erythema edema. L> R.  
Neurologic: following commands, moving all extremities Psychiatric: full affect, no hallucinations HISTORY:  
 
Principal Problem: 
  Cellulitis and abscess of leg, except foot (1/13/2013) Active Problems: 
  HTN (hypertension) (1/13/2013) Lymphedema (1/13/2021) Acute CHF (congestive heart failure) ([facility] (1/13/2021) Hypokalemia (1/14/2021) Amputation of right great toe (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  
Diagnosis Date  ARF (acute renal failure) ([facility] [address] 6/3/2013  Arrhythmia A-Fib  Arthritis  Atrial fibrillation ([facility] [address]  Cellulitis  Hypertension  Unspecified sleep apnea   
 recently notified doesn't need c-pap Past Surgical History:  
Procedure Laterality Date  HX CATARACT REMOVAL    
 HX ORTHOPAEDIC    
 bilateral knee replacments  HX ORTHOPAEDIC  2017  
 betancourt procedure Family History Problem Relation Age of Onset  Cancer Mother  Heart Disease Father  Cancer Sister  Hypertension Sister History reviewed, no pertinent family history. Social History Tobacco Use  Smoking status: Never Smoker  Smokeless tobacco: Never Used Substance Use Topics  Alcohol use: No  
 
Allergies Allergen Reactions  Ambien [Zolpidem] Other (comments) Hallucinations Current Facility-Administered Medications Medication Dose Route Frequency  piperacillin-tazobactam (ZOSYN) 2.25 g in 0.9% sodium chloride (MBP/ADV) 50 mL MBP  2.25 g IntraVENous Q6H  
 lactated Ringers infusion  125 mL/hr IntraVENous CONTINUOUS  
 [START ON 1/16/2021] vancomycin (VANCOCIN) 1250 mg in  ml infusion  1,250 mg IntraVENous Q24H  
 latanoprost (XALATAN) 0.005 % ophthalmic solution 1 Drop  1 Drop Both Eyes QPM  
 losartan (COZAAR) tablet 25 mg  25 mg Oral DAILY  insulin lispro (HUMALOG) injection   SubCUTAneous AC&HS  
 glucose chewable tablet 16 g  4 Tab Oral PRN  
 glucagon (GLUCAGEN) injection 1 mg  1 mg IntraMUSCular PRN  
 dextrose 10% infusion 125-250 mL  125-250 mL IntraVENous PRN  
 sodium chloride (NS) flush 5-10 mL  5-10 mL IntraVENous PRN  
 apixaban (ELIQUIS) tablet 2.5 mg  2.5 mg Oral BID  atenoloL (TENORMIN) tablet 50 mg  50 mg Oral QPM  
 potassium chloride (KLOR-CON) tablet 10 mEq  10 mEq Oral BID  sodium chloride (NS) flush 5-40 mL  5-40 mL IntraVENous Q8H  
 sodium chloride (NS) flush 5-40 mL  5-40 mL IntraVENous PRN  
 acetaminophen (TYLENOL) tablet 650 mg  650 mg Oral Q6H PRN  Or  
  acetaminophen (TYLENOL) suppository 650 mg  650 mg Rectal Q6H PRN  polyethylene glycol (MIRALAX) packet 17 g  17 g Oral DAILY PRN  promethazine (PHENERGAN) tablet 12.5 mg  12.5 mg Oral Q6H PRN Or  
 ondansetron (ZOFRAN) injection 4 mg  4 mg IntraVENous Q6H PRN  Vancomycin - Pharmacy to Dose  1 Each Other Rx Dosing/Monitoring LAB AND IMAGING FINDINGS:  
 
Lab Results Component Value Date/Time WBC 15.5 (H) 01/15/2021 03:35 AM  
 HGB 8.3 (L) 01/15/2021 03:35 AM  
 PLATELET 266 75/92/6017 03:35 AM  
 
Lab Results Component Value Date/Time Sodium 139 01/15/2021 03:35 AM  
 Potassium 3.3 (L) 01/15/2021 03:35 AM  
 Chloride 103 01/15/2021 03:35 AM  
 CO2 25 01/15/2021 03:35 AM  
 BUN 48 (H) 01/15/2021 03:35 AM  
 Creatinine 2.15 (H) 01/15/2021 03:35 AM  
 Calcium 8.3 (L) 01/15/2021 03:35 AM  
 Magnesium 2.0 01/15/2021 03:35 AM  
 Phosphorus 3.4 11/17/2014 06:50 AM  
  
Lab Results Component Value Date/Time Alk. phosphatase 120 (H) 01/15/2021 03:35 AM  
 Protein, total 6.7 01/15/2021 03:35 AM  
 Albumin 2.9 (L) 01/15/2021 03:35 AM  
 Globulin 3.8 01/15/2021 03:35 AM  
 
Lab Results Component Value Date/Time INR 1.8 (H) 01/13/2021 10:21 AM  
 Prothrombin time 20.2 (H) 01/13/2021 10:21 AM  
 aPTT 44.9 (H) 01/13/2021 10:21 AM  
  
No results found for: IRON, FE, TIBC, IBCT, PSAT, FERR No results found for: PH, PCO2, PO2 No components found for: [place] Lab Results Component Value Date/Time  01/14/2021 01:30 AM  
 CK - MB 2.9 01/14/2021 01:30 AM  
  
 
   
 
Total time: 70 minutes Counseling / coordination time, spent as noted above > 50% counseling / coordination: 50 minutes with patient Prolonged service was provided for  []30 min   []75 min in face to face time in the presence of the patient, spent as noted above. Time Start:  
Time End:  
Note: this can only be billed with 75876 (initial) or 50307 (follow up). If multiple start / stop times, list each separately.

## 2021-01-15 NOTE — PERIOP NOTES
Patient arrived with Albumin infusing and pump was alarming, IV site to right hand having difficulty, IV DC'd with cath tip intact and bleeding noted, pressure applied and bandage applied. IV to right upper arm flushed and patent and Albumin infusing and patent, LR connected but not started. Reviewed PTA medication list with patient/caregiver and patient/caregiver denies any additional medications. Patient admits to having a responsible adult care for them at home for at least 24 hours after surgery. Patient encouraged to use gown warming system and informed that using said warming gown to regulate body temperature prior to a procedure has been shown to help reduce the risks of blood clots and infection. Patient's pharmacy of choice verified and documented in PTA medication section. Dual skin assessment & fall risk band verification completed with Felipe Guzmán RN.

## 2021-01-15 NOTE — PROGRESS NOTES
Problem: Mobility Impaired (Adult and Pediatric) Goal: *Acute Goals and Plan of Care (Insert Text) Description: Physical Therapy Goals Initiated 1/14/2021 and to be accomplished within 7 day(s) 1. Patient will move from supine <> sit with SBA in prep for out of bed activity and change of position. 2.  Patient will perform sit<> stand with CGA/min A/RW in prep for transfers/ambulation. 3.  Patient will transfer from bed <> chair with CGA/min A/RW for time up in chair for completion of ADL activity. 4.  Patient will ambulate 50 feet with CGA/MIN A/RW for improved functional mobility at discharge. 5.  Patient will ascend/descend 3-5 stairs with handrail(s) with minimal assistance/contact guard assist for home re-entry as needed. Outcome: Progressing Towards Goal 
PHYSICAL THERAPY TREATMENT Patient: Anshul Avalos (79 y.o. male) Date: 1/15/2021 Diagnosis: Acute CHF (congestive heart failure) ([facility] [address] [I50.9] Lymphedema [I89.0] Cellulitis and abscess of leg, except foot Procedure(s) (LRB): 
RIGHT FOOT IRRIGATION AND DEBRIDEMENT, REMOVAL OF SECOND METATARSAL (Right) Day of Surgery Precautions: Fall Chart, physical therapy assessment, plan of care and goals were reviewed. ASSESSMENT: 
Pt seated EOB on PT arrival with LE's in dependent position. Educated pt in avoidance of extended time in position due to edema LE's and weeping present. Pt encouraged to return to supine as sitting x 30min. Pt declined. Completed LE ex as noted below. SUSAN Abreu entered room to prepare pt for surgery and pt needing to transition to supine. Required max/total assist of 3 sit>supine, to roll for change of Eleele sheet and pad replacement, then for brief change and hygiene care with mod/max assist of 2. Left in NAD awaiting surgery. Pt remains unable to stand at this time. Rec SNF at discharge. Progression toward goals: 
[]      Improving appropriately and progressing toward goals [x]      Improving slowly and progressing toward goals 
[]      Not making progress toward goals and plan of care will be adjusted PLAN: 
Patient continues to benefit from skilled intervention to address the above impairments. Continue treatment per established plan of care. Discharge Recommendations:  Anup Stovall Further Equipment Recommendations for Discharge:  N/A  
 
SUBJECTIVE:  
Patient stated I guess I'm okay; I go for surgery today.  OBJECTIVE DATA SUMMARY:  
Critical Behavior: 
Neurologic State: Confused Orientation Level: Disoriented to place Cognition: Appropriate decision making, Appropriate for age attention/concentration, Appropriate safety awareness, Follows commands Safety/Judgement: Awareness of environment, Fall prevention Functional Mobility Training: 
Bed Mobility: 
Rolling: Maximum assistance Sit to Supine: Maximum assistance;Assist x2(plus 1 for management of LE's) Scooting: Total assistance(use of Bruno bd) Transfers: 
Balance: 
Sitting: Intact(EOB) Therapeutic Exercises:  
Knee flexion/ext as tolerated AA BILE's x 10, AP's x 10; Seated push ups x 5 Pain: 
Pain Scale 1: Visual 
Pain Intensity 1: 0 Activity Tolerance:  
Fair Please refer to the flowsheet for vital signs taken during this treatment. After treatment:  
[] Patient left in no apparent distress sitting up in chair 
[x] Patient left in no apparent distress in bed 
[x] Call bell left within reach 
[] Nursing notified 
[] Caregiver present 
[] Bed alarm activated Marisol Headley PT Time Calculation: 43 mins

## 2021-01-15 NOTE — PROGRESS NOTES
5024- Pt is slightly confused, he stated \" Oh we are in the hospital I didn't know I thought we were at home\" Pt was able to be reoriented and he remember the conversations about his feet today with the doctor. Pt then stated can we move the chair that was in front of him and there was not a chair there. Will continue to monitor the pt and make  aware. [facility] aware. [id number]: Pt had bouts of confusion all night but could be re orientated easily and once done he is A & O X 4. The pt rested well overnight. Night shift chart check completed

## 2021-01-15 NOTE — ROUTINE PROCESS
0725 
Bedside shift change report given to Breana EDMOND RN (oncoming nurse) by Paul Cantrell RN (offgoing nurse). Report included the following information SBAR, Kardex, Intake/Output and MAR.

## 2021-01-15 NOTE — PROGRESS NOTES
Pharmacy Dosing Services: Renal Adjustment- Zosyn Zosyn was automatically dose-adjusted per [facility] P&T Committee Protocol, with respect to renal function. Consult provided for this   79 y.o. , male , for the indication of SSTI. Pt Weight:  
Wt Readings from Last 1 Encounters:  
01/14/21 100.7 kg (222 lb) Previous Regimen Zosyn 3.375g Q6H Serum Creatinine Lab Results Component Value Date/Time Creatinine 2.15 (H) 01/15/2021 03:35 AM  
   
Creatinine Clearance Estimated Creatinine Clearance: 33.1 mL/min (A) (based on SCr of 2.15 mg/dL (H)). BUN Lab Results Component Value Date/Time BUN 48 (H) 01/15/2021 03:35 AM  
   
 
Change to Zosyn 2.25g Q6H Pharmacy to continue to monitor patient daily. Will make dosage adjustments based upon changing renal function. Keely, [address] [facility]

## 2021-01-16 NOTE — PROGRESS NOTES
Recived report from Maico Leahy RN and Petra Espinal RN assumed care. Pt awake, alert and oriented. Pt resting in bed with no acute signs of distress but c/o pain 7/10. Pt refused medication at this time. Breakthrough drainage noted on dressing of surgical site. Juanito pad placed beneath foot  Call bell and telephone within reach. 0140:  Pt has not voided. Straight cath out 1200

## 2021-01-16 NOTE — PROGRESS NOTES
Problem: Falls - Risk of 
Goal: *Absence of Falls Description: Document Danney Mess Fall Risk and appropriate interventions in the flowsheet. Outcome: Progressing Towards Goal 
Note: Fall Risk Interventions: 
Mobility Interventions: Assess mobility with egress test 
 
Mentation Interventions: Adequate sleep, hydration, pain control, Door open when patient unattended Medication Interventions: Patient to call before getting OOB Elimination Interventions: Bed/chair exit alarm, Elevated toilet seat History of Falls Interventions: Bed/chair exit alarm Problem: Patient Education: Go to Patient Education Activity Goal: Patient/Family Education Outcome: Progressing Towards Goal 
  
Problem: Pressure Injury - Risk of 
Goal: *Prevention of pressure injury Description: Document Po Scale and appropriate interventions in the flowsheet. Outcome: Progressing Towards Goal 
Note: Pressure Injury Interventions: 
Sensory Interventions: Assess changes in LOC, Float heels Moisture Interventions: Absorbent underpads Activity Interventions: Increase time out of bed, Pressure redistribution bed/mattress(bed type) Mobility Interventions: Assess need for specialty bed, Float heels Nutrition Interventions: Document food/fluid/supplement intake Friction and Shear Interventions: Apply protective barrier, creams and emollients Problem: Patient Education: Go to Patient Education Activity Goal: Patient/Family Education Outcome: Progressing Towards Goal 
  
Problem: Patient Education: Go to Patient Education Activity Goal: Patient/Family Education Outcome: Progressing Towards Goal 
  
Problem: Cellulitis Care Plan (Adult) Goal: *Control of acute pain Outcome: Progressing Towards Goal 
Goal: *Skin integrity maintained Outcome: Progressing Towards Goal 
Goal: *Absence of infection signs and symptoms Outcome: Progressing Towards Goal 
  
 Problem: Patient Education: Go to Patient Education Activity Goal: Patient/Family Education Outcome: Progressing Towards Goal 
  
Problem: Hypertension Goal: *Blood pressure within specified parameters Outcome: Progressing Towards Goal 
Goal: *Fluid volume balance Outcome: Progressing Towards Goal 
Goal: *Labs within defined limits Outcome: Progressing Towards Goal 
  
Problem: Patient Education: Go to Patient Education Activity Goal: Patient/Family Education Outcome: Progressing Towards Goal 
  
Problem: Surgical Pathway Day of Surgery Goal: Nutrition/Diet Outcome: Progressing Towards Goal 
Goal: Medications Outcome: Progressing Towards Goal 
Goal: Respiratory Outcome: Progressing Towards Goal

## 2021-01-16 NOTE — ROUTINE PROCESS
1157 
Bedside shift change report given to Breana EDMOND RN (oncoming nurse) by Melina Abreu RN (offgoing nurse). Report included the following information SBAR, Kardex, Intake/Output and MAR.

## 2021-01-16 NOTE — ROUTINE PROCESS
1956 
Bedside shift change report given to [address] [place] [id number] RN(oncoming nurse) by Margarita Snowden RN (offgoing nurse). Report included the following information SBAR, Kardex, Intake/Output and MAR.

## 2021-01-16 NOTE — PROGRESS NOTES
1930:   Assumed care of pt from. Pt in room  A&OX3 with no signs of distress. Pt does report pain 7/10 but refuses anything for pain. Surgical site is draining fluid, chucks pad placed beneath. Weeping edema to bilateral lower ext 4+. Plan of care discussed with pt. Pt expressed understanding 0130: Pt has not urinated this shift. Bladder scan >999 
 
0145:  Straight cath 1250 out

## 2021-01-16 NOTE — PROGRESS NOTES
[facility] Patient alert and oriented x3. Disoriented to place. Denies SOB, chest pain. Shows no signs of distress. Patient lungs clear bilaterally. Cap refill < 3 sec to all extremities. Patient has 22 G IV to R arm CDI. Stated pain 5/10. Dressing to R foot has moderate sanguineous breakthrough. Prior reinforcement removed and new ace and abd applied over original dressing. Bowel sounds present. Weeping edema 4+ BLE. Bladder scan 961. Patient had straight cath at [address] [facility] and has voided less than 50 ml of urine since then according to offcoming nurse Elias Drake RN. Patient call light and possessions within reach. Bed locked and in lowest position. Nurse will continue to monitor. [address] [place] Overton inserted per order as this would be second cath attempt post op. 900 ml of martínez urine outputed in overton bag. 
 
1975 Completed routine Covid. [address] [facility] Patient alert and oriented x3. Patient had uneventful shift. Voiding sufficient amounts via overton. Patient has not ambulated d/t weakness and refusal with PT. Pain controlled by prn tylenol. Dressing CDI. Night nurse aware that HGB is 7.2 and to continue monitoring.

## 2021-01-16 NOTE — PROGRESS NOTES
Problem: Mobility Impaired (Adult and Pediatric) Goal: *Acute Goals and Plan of Care (Insert Text) Description: Physical Therapy Goals Initiated 1/14/2021 and to be accomplished within 7 day(s) 1. Patient will move from supine <> sit with SBA in prep for out of bed activity and change of position. 2.  Patient will perform sit<> stand with CGA/min A/RW in prep for transfers/ambulation. 3.  Patient will transfer from bed <> chair with CGA/min A/RW for time up in chair for completion of ADL activity. 4.  Patient will ambulate 50 feet with CGA/MIN A/RW for improved functional mobility at discharge. 5.  Patient will ascend/descend 3-5 stairs with handrail(s) with minimal assistance/contact guard assist for home re-entry as needed. Outcome: Not Progressing Towards Goal 
 PHYSICAL THERAPY TREATMENT Patient: Ngoc Hayes (79 y.o. male) Date: 1/16/2021 Diagnosis: Acute CHF (congestive heart failure) ([facility] [address] [I50.9] Lymphedema [I89.0] Cellulitis and abscess of leg, except foot Procedure(s) (LRB): 
RIGHT FOOT IRRIGATION AND DEBRIDEMENT, REMOVAL OF SECOND METATARSAL (Right) 1 Day Post-Op Precautions: Fall PLOF: ambulatory with a RW 
 
ASSESSMENT: 
Pt unable to assist much with bed mobility. When attempting to log roll pt yells out in pain from the L hip. Much time needed to elevated RLE up off of blanket stack, yells in pain at R foot and calf. TotA to mobilize RLE and maxA with LLE. Unable to tolerated ROM of (B)LEs, deferred any further PT at this time. Replaced blanket stack to elevate RLE, place bed in trendelenburg position to also assist with edema reduction. Progression toward goals:  
[]      Improving appropriately and progressing toward goals 
[]      Improving slowly and progressing toward goals [x]      Not making progress toward goals and plan of care will be adjusted PLAN: 
 Patient continues to benefit from skilled intervention to address the above impairments. Continue treatment per established plan of care. Discharge Recommendations:  Anup Stovall Further Equipment Recommendations for Discharge:  TBD by next level of care SUBJECTIVE:  
Patient stated I think they are sending me to rehab.  OBJECTIVE DATA SUMMARY:  
Critical Behavior: 
Neurologic State: Alert Orientation Level: Oriented X4 Cognition: Follows commands, Appropriate decision making, Appropriate for age attention/concentration Safety/Judgement: Awareness of environment, Fall prevention Functional Mobility Training: 
Bed Mobility: 
Rolling: (attempted, too painful) Pain: 
Pain level pre-treatment: 10/10 Pain level post-treatment: 10/10 Pain Intervention(s): Medication (see MAR); Rest, Ice, Repositioning Response to intervention: Nurse notified, See doc flow Activity Tolerance:  
Poor Please refer to the flowsheet for vital signs taken during this treatment. After treatment:  
[] Patient left in no apparent distress sitting up in chair 
[x] Patient left in no apparent distress in bed 
[x] Call bell left within reach 
[] Nursing notified 
[] Caregiver present 
[] Bed alarm activated 
[] SCDs applied COMMUNICATION/EDUCATION:  
[x]         Role of Physical Therapy in the acute care setting. [x]         Fall prevention education was provided and the patient/caregiver indicated understanding. [x]         Patient/family have participated as able in working toward goals and plan of care. [x]         Patient/family agree to work toward stated goals and plan of care. []         Patient understands intent and goals of therapy, but is neutral about his/her participation. []         Patient is unable to participate in stated goals/plan of care: ongoing with therapy staff. 
[]         Other: 
 
   
Jasmeet Carrera PTA Time Calculation: 23 mins

## 2021-01-16 NOTE — OP NOTES
[facility] [place] 
OPERATIVE REPORT Name:  Sarwat Fontaine 
MR#:   [id number] :  1940 ACCOUNT #:  [de-identified] DATE OF SERVICE: 
 
PREOPERATIVE DIAGNOSES: 
1. Ulcer, right foot. 2.  Abscess and cellulitis, right foot. 3.  Osteomyelitis, right foot. POSTOPERATIVE DIAGNOSES: 
1. Ulcer, right foot. 2.  Abscess and cellulitis, right foot. 3.  Osteomyelitis, right foot. PROCEDURES PERFORMED: 
1. Excision of ulcer, right foot. 2.  Incision and drainage of abscess, subfascial, right foot. 3.  Incision to bone cortex and resection of infected bone, right foot. SURGEON:  Brinda Morales DPM 
 
ASSISTANT:  None ANESTHESIA:  General. 
 
COMPLICATIONS:  None SPECIMENS REMOVED:  Foot IMPLANTS:  None ESTIMATED BLOOD LOSS:  None INDICATIONS:  This is a very pleasant 28-year-old male who developed a recent severe limb-threatening infection of the right foot with gas gangrene and underlying osteomyelitis, confirmed by MRI. After careful consideration of all risks, benefits, and alternatives, we are moving forward with incision and drainage of the infection, resection of any infected bone with ulcer excision with the intention of moving forward with transmetatarsal amputation down the road once the soft tissue has recovered and the infection has been under control. Complications include delayed healing, nonhealing, continued infection, need for future surgery, and certainly loss of limb. No guarantees were given. Informed consent was obtained. PREPARATION OF PROCEDURE:  The patient was taken to the operating room, placed on the table in supine position. After induction of IV sedation and general anesthetic, a well-padded pneumatic ankle tourniquet was applied. The patient's right lower extremity was then prepped and draped in usual sterile manner. The blood was exsanguinated with the use of Esmarch bandage and pneumatic ankle tourniquet was inflated to 250 mmHg. PROCEDURE #1:  Excision of ulcer, right foot. At this point, an incision was made along the plantar aspect of right foot, after which point a 15-blade was used to excise an ulcer on the plantar aspect of the right second metatarsal head. Necrotic subcutaneous tissue was noted and debrided aggressively. Surgical site was copiously irrigated. The ulcer was excised down to the level of the underlying muscle. PROCEDURE #2:  Incision and drainage of abscess, right foot. At this point, a second incision was made on the dorsal aspect of right foot, 5 cm in length. The incision was deepened down to the level of subcutaneous tissue taking care to Bovie retract or ligate all neurovascular structures as necessary. I deepened the incision down to the level of the intramuscular compartment, and I incised and drained the intramuscular compartment and subfascial abscess. Cultures were taken. Moderate serosanguineous discharge was noted. Some purulence and thrombosed blood vessels noted with significant thickening and hypertrophy of the subcutaneous tissue. PROCEDURE #3:  Incision to bone cortex and resection of second metatarsal head, right foot. Attention was directed to the periosteum of second metatarsal after which point the periosteum was incised. The second metatarsal was brought into the surgical field and excised in its entirety. Surgical site was copiously irrigated with a normal sterile saline pulse lavage with gentamicin. I then packed the wound open with iodoform gauze to the plantar wound as well as the dorsal wound. I reinforced with fluffs, 4x4, Andressa, Coban, and an Ace bandage after the pneumatic ankle tourniquet was deflated. The patient will be readmitted and followed as an inpatient with the intention of moving forward with transmetatarsal amputation next week once the infection is under control. MAURI [email or web address] 
D:  01/15/2021 15:32 
 T:  01/16/2021 7:49 JOB #:  [id number]

## 2021-01-16 NOTE — PROGRESS NOTES
Hospitalist Progress Note-critical care note Patient: Kamila Flores MRN: [id number]  CSN: [id number] YOB: 1940  Age: 79 y.o. 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 
ICD-9-CM: 401.9  1/13/2013 Yes Pt was admitted for sob and suspected chf exacerbation, also leg cellilitis and possible toe OM Acute CHF (congestive heart failure) ([facility] (1/13/2021) No sob  echo with ef 40 %, will hold lasix F/u with dr. Jose Parker as out -pt Lymphedema (1/13/2021) No dvt elevated leg Cellulitis and abscess of leg, except foot (1/13/2013) Mild improving IV Zosyn/Vanc Pharmacy dosing 
  
    
Diabetic Foot Ulcer with some discharge from amputated toes site Drain and debridement per Dr. Gabbi Pickens yesterday Continue local wound care  
 
  
PAF Continue Eloquis , so far rate controlled per atenolol  
  
laura- ckd3 Elevated today , hold lasix and losartan Continue monitoring cr  
 
DM type II Continue ssi Hypokalemia  
k replacement Subjective : I  Feel fine, no pain Disposition :tbd, Review of systems: 
 
General: No fevers or chills. Cardiovascular: No chest pain or pressure. No palpitations. Pulmonary: No shortness of breath. Gastrointestinal: No nausea, vomiting. Vital signs/Intake and Output: 
Visit Vitals /78 (BP 1 Location: Left arm, BP Patient Position: At rest) Pulse 75 Temp 98.1 °F (36.7 °C) Resp 16 Ht 6' (1.829 m) Wt 100.7 kg (222 lb) SpO2 100% BMI 30.11 kg/m² Current Shift:  No intake/output data recorded. Last three shifts:  01/14 1901 - 01/16 0700 In: 56 [P.O.:240; I.V.:370] Out: 400 [Urine:400] Physical Exam: 
General: WD, WN. Alert, cooperative, no acute distress HEENT: NC, Atraumatic. PERRLA, anicteric sclerae. Lungs: CTA Bilaterally. No Wheezing/Rhonchi/Rales. Heart:  Regular  rhythm,  No murmur, No Rubs, No Gallops Abdomen: Soft, Non distended, Non tender. +Bowel sounds, Extremities: No c/c. Skin lesion noted on bilateral legs ,improving, rt foot covered wrapped with ace bandage Psych:   Not anxious or agitated. Neurologic:  No acute neurological deficit. Labs: Results:  
   
Chemistry Recent Labs  
  01/16/21 
0105 01/15/21 
0335 01/14/21 
0130 * 93 132*  139 136  
K 3.3* 3.3* 3.1*  
 103 100 CO2 23 25 26 BUN 60* 48* 42* CREA 2.81* 2.15* 1.67* CA 7.9* 8.3* 8.2* AGAP 12 11 10 BUCR 21* 22* 25*  120* 137* TP 6.1* 6.7 6.5 ALB 2.8* 2.9* 2.4*  
GLOB 3.3 3.8 4.1* AGRAT 0.8 0.8 0.6* CBC w/Diff Recent Labs  
  01/16/21 
0105 01/15/21 
0335 01/14/21 
0130 WBC 15.0* 15.5* 14.2*  
RBC 2.58* 2.86* 2.95* HGB 7.2* 8.3* 8.5* HCT 23.6* 26.1* 26.8*  
 334 352 GRANS 82* 82* 84* LYMPH 7* 8* 7*  
EOS 2 1 0 Cardiac Enzymes Recent Labs  
  01/14/21 
0130 01/13/21 
2110  101 CKND1 2.9 3.5 Coagulation No results for input(s): PTP, INR, APTT, INREXT, INREXT in the last 72 hours. Lipid Panel No results found for: CHOL, CHOLPOCT, CHOLX, CHLST, CHOLV, 454882, HDL, HDLP, LDL, LDLC, DLDLP, 182527, VLDLC, VLDL, TGLX, TRIGL, TRIGP, TGLPOCT, CHHD, CHHDX  
BNP No results for input(s): BNPP in the last 72 hours. Liver Enzymes Recent Labs  
  01/16/21 
0105 TP 6.1* ALB 2.8*  Thyroid Studies No results found for: T4, T3U, TSH, TSHEXT, TSHEXT Procedures/imaging: see electronic medical records for all procedures/Xrays and details which were not copied into this note but were reviewed prior to creation of Plan Mri Foot Rt Wo Cont Result Date: 1/14/2021 Procedure: MRI of the right forefoot without contrast. HISTORY: -From Provider: diabetic ulcer -Additional: Ulcer distal plantar right foot Comparisons: None Technique: Sagittal and coronal T1 and STIR; axial T1 and T2 with fat saturation sequences of the forefoot were obtained. Intravenous gadolinium was not requested or obtained. The study is motion degraded even with repeat attempt, which does limit sensitivity. Subtle diagnostic for clinical question. Findings: 2nd digit: There is heterogeneous soft tissue thickening surrounding the distal aspect of the head of the 2nd metatarsal, with surrounding foci of low signal intensity, suspicious for gas, largest dorsal lateral to the 2nd metatarsal head. There is adjacent heterogeneously T1 hypointense, T2/STIR hyperintense thickening in the subcutaneous fat, extending to the skin margin of the 2nd digit, at the site of suspected dressing. This may correspond to the site of the ulcer, but should be clinically correlated. There is diffuse fatty change in the intrinsic foot musculature, with focally heterogeneously hyperintense STIR signal in the soft tissues surrounding the 2nd metatarsal diaphysis. Indistinct cortical margin of the 2nd metatarsal head, subjacent patchy T1 hypointense STIR and T2 hyperintense marrow signal in the head and distal diaphysis of the 2nd metatarsal. 1st digit: Amputation of the phalanges distal to the 1st metatarsal head. Cartilage loss and subchondral marrow hyperintensity in the medial cuneiform subjacent to the 1st tarsal metatarsal joint and mild spurring at the 1st metatarsal-sesamoid joint. The 1st and 2nd phalanges are absent, suggesting prior amputation. There is thickening of the subcutaneous fat and skin at the plantar aspect of the 2nd and 3rd MTP joints. There is skin puckering at the plantar aspect of the foot between the 3rd and 4th MTP joints.  There is dorsiflexion at the MTP and plantarflexion at the DIP joints of the 3rd-5th digits. No acute associated marrow signal abnormalities are seen Impression: Images significant degraded by motion artifact. 1. Findings suspicious for osteomyelitis in the head and neck of the 2nd metatarsal, with surrounding cellulitis. Recommend correlation with clinical and laboratory findings. 2. Foci of low signal intensity in the soft tissues about the head of the 2nd metatarsal are suspicious for gas, and can be seen in the setting of gas forming infection or recent intervention. 3. Edema/myositis in the fatty replaced intrinsic foot musculature about the 2nd metatarsal 4. Plantar subcutaneous fat and skin thickening at the 1st and 2nd metatarsal heads, which can be related to cellulitis or chronic changes. Skin puckering at the plantar 3rd-4th metatarsal interspace is age indeterminate, and can reflect chronic or acute ulcerative etiology. 5. Presumed amputation of the 1st and 2nd phalanges distal to the metatarsal heads. Degenerative changes at the 1st tarsal metatarsal and 1st metatarsal sesamoid joint. Ct Head Wo Cont Result Date: 1/13/2021 EXAM: CT HEAD, WITHOUT IV CONTRAST INDICATION: Lethargy, headache, bilateral lower extremity swelling COMPARISON: None TECHNIQUE: Multiple axial CT images of the head were obtained extending from the skull base through the vertex. Additional coronal and sagittal reformations were also performed. One or more dose reduction techniques were used on this CT: automated exposure control, adjustment of the mAs and/or kVp according to patient size, and iterative reconstruction techniques. The specific techniques used on this CT exam have been documented in the patient's electronic medical record. Digital Imaging and Communications in Medicine (DICOM) format image data are available to nonaffiliated external healthcare facilities or entities on a secure, media free, reciprocally searchable basis with patient authorization for at least a 12-month period after this study. _______________ FINDINGS: BRAIN AND POSTERIOR FOSSA: There are a few small, probably chronic right and left basal ganglia lacunar infarcts. There is generalized prominence of the ventricular system, associated with proportional widening of the cortical cerebral sulci, compatible with generalized volume loss. Hazy hypoattenuation identified along the periventricular white matter, a nonspecific finding which is most commonly encountered in the setting of chronic microvascular disease. There is no intracranial hemorrhage, mass effect, or midline shift. There are no significant additional areas of abnormal parenchymal attenuation. EXTRA-AXIAL SPACES AND MENINGES: There are no abnormal extra-axial fluid collections. CALVARIUM: No acute osseous abnormality. OTHER: The visualized portions of the paranasal sinuses and mastoid air cells are clear. _______________ IMPRESSION: 1. No CT evidence of an acute intracranial abnormality - in particular, no acute cortical infarct, hemorrhage, or mass effect. 2.  Mild cerebral atrophy/volume loss and periventricular white matter changes, most commonly seen with chronic microvascular disease. Xr Chest Mary Beth Trinidad Result Date: 1/13/2021 EXAM: XR CHEST PORT CLINICAL INDICATION/HISTORY: gen weakness -Additional: None COMPARISON: 1/8/2021 TECHNIQUE: Portable frontal view of the chest _______________ FINDINGS: SUPPORT DEVICES: None. HEART AND MEDIASTINUM: Cardiomediastinal silhouette within normal limits. LUNGS AND PLEURAL SPACES: Hypoinflated lungs. No dense consolidation, large effusion or pneumothorax. _______________ IMPRESSION: No acute cardiopulmonary abnormality. Xr Chest Mary Beth Trinidad Result Date: 1/8/2021 EXAM: XR CHEST PORT CLINICAL INDICATION/HISTORY: Chest pain -Additional: None COMPARISON: 11/17/2014 TECHNIQUE: Portable frontal view of the chest _______________ FINDINGS: SUPPORT DEVICES: None. HEART AND MEDIASTINUM: Cardiomegaly. LUNGS AND PLEURAL SPACES: No dense consolidation, large effusion or pneumothorax. _______________ IMPRESSION: Cardiomegaly. No acute radiographic abnormality. Duplex Lower Ext Venous Bilat Result Date: 1/13/2021 · No evidence of deep vein thrombosis in the right lower extremity veins assessed. · No evidence of deep vein thrombosis in the left lower extremity veins assessed. Patency of mid to distal femoral vein bilaterally is done by the color flow and doppler signal only Duplex Lower Ext Artery Bilat Result Date: 1/14/2021 No evidence of any significant peripheral arterial disease in the bilateral lower extremity. Triphasic waveforms noted throughout.  
 
 
Pete Pryor MD

## 2021-01-16 NOTE — ROUTINE PROCESS
Bedside and Verbal shift change report given to Oj Contreras RN (oncoming nurse) by Deon Thornton (offgoing nurse). Report included the following information SBAR, Kardex, Intake/Output, MAR, Recent Results and Cardiac Rhythm.

## 2021-01-17 NOTE — PROGRESS NOTES
Patient Name: Naif Michaud. Age: 79 y.o. Sex:  male YOB: 1940 Medical Record Number: [id number] Antimicrobial  Vancomycin Indication SSTI Dose / Interval  
 
 
 
 
Current Antimicrobial Therapy (168h ago, onward) Ordered     Start Stop  
 01/15/21 0852  piperacillin-tazobactam (ZOSYN) 2.25 g in 0.9% sodium chloride (MBP/ADV) 50 mL MBP  2.25 g,   IntraVENous,   EVERY 6 HOURS    
 01/15/21 1300 01/20/21 1459 Last Level (if applicable) Lab Results Component Value Date/Time Reported dose: 1250 MG 01/17/2021 01:30 AM  
 Reported dose time: 3689 01/17/2021 01:30 AM  
 Reported dose date [id number] 01/17/2021 01:30 AM  
 
Vancomycin Lab Results Component Value Date/Time Vancomycin,trough 24.0 (HH) 01/17/2021 01:30 AM  
  
Gentamicin No results found for: GENP, GENT, GENR] Tobramycin No results found for: TOBP, TOBT, TOBR Amikacin No results found for: Kati Powell, HEAVEN, Jessica Vargas Cultures (7 most recent) GRAM STAIN Date Value Ref Range Status 01/15/2021 OCCASIONAL WBCS SEEN   Preliminary 01/15/2021 NO ORGANISMS SEEN   Preliminary Culture result:  
Date Value Ref Range Status 01/15/2021 CULTURE IN PROGRESS,FURTHER UPDATES TO FOLLOW   Preliminary 01/13/2021 NO GROWTH 3 DAYS   Preliminary 01/13/2021 NO GROWTH 3 DAYS   Preliminary 09/20/2017 NO GROWTH 6 DAYS   Final  
11/20/2014 NO GROWTH 1 DAY   Final  
11/17/2014 NO GROWTH 6 DAYS   Final  
11/17/2014 NO GROWTH 6 DAYS   Final  
  
Renal Overview (72 hr) Recent Labs  
  01/16/21 
0105 01/15/21 
0335 BUN 60* 48* CREA 2.81* 2.15* CrCl (Current): Estimated Creatinine Clearance: 25.3 mL/min (A) (based on SCr of 2.81 mg/dL (H)). Lactic Acid (Sepsis Criteria: >2.0 mmol/L) Lab Results Component Value Date/Time  Lactic acid 1.8 11/16/2014 04:28 PM  
  
Procalcitonin (0.10-0.49 NG/ML) No results found for: PCT  
 Hepatic Overview (72 hr) Recent Labs  
  21 
0105 01/15/21 
0335 ALT 23 25  120* Temp (24-hr Max) Temp (24hrs), Av.9 °F (36.6 °C), Min:97.4 °F (36.3 °C), Max:98.4 °F (36.9 °C) Hematology Recent Labs  
  21 
0105 01/15/21 
0335 WBC 15.0* 15.5* HGB 7.2* 8.3* HCT 23.6* 26.1*  
 334 GRANS 82* 82* ANEU 12.2* 12.8* Assessment Vancomycin trough level - 24mcg/ml on 21. Patient is supratherapeutic Goal trough level- 10-15mcg/ml Plan Will hold dose for today and order random level for  at 01:00 Redose when level < 20mcg/ml Gabriel Fowler, 93 Anali Clark Pharmacist 
[phone]

## 2021-01-17 NOTE — PROGRESS NOTES
Problem: Falls - Risk of 
Goal: *Absence of Falls Description: Document Thora Bare Fall Risk and appropriate interventions in the flowsheet. Outcome: Progressing Towards Goal 
Note: Fall Risk Interventions: 
Mobility Interventions: Bed/chair exit alarm, Patient to call before getting OOB Mentation Interventions: Adequate sleep, hydration, pain control, Door open when patient unattended Medication Interventions: Patient to call before getting OOB Elimination Interventions: Call light in reach History of Falls Interventions: Door open when patient unattended, Bed/chair exit alarm Problem: Patient Education: Go to Patient Education Activity Goal: Patient/Family Education Outcome: Progressing Towards Goal 
  
Problem: Pressure Injury - Risk of 
Goal: *Prevention of pressure injury Description: Document Po Scale and appropriate interventions in the flowsheet. Outcome: Progressing Towards Goal 
Note: Pressure Injury Interventions: 
Sensory Interventions: Assess changes in LOC Moisture Interventions: Absorbent underpads Activity Interventions: Pressure redistribution bed/mattress(bed type), PT/OT evaluation Mobility Interventions: PT/OT evaluation Nutrition Interventions: Document food/fluid/supplement intake Friction and Shear Interventions: HOB 30 degrees or less Problem: Patient Education: Go to Patient Education Activity Goal: Patient/Family Education Outcome: Progressing Towards Goal 
  
Problem: Patient Education: Go to Patient Education Activity Goal: Patient/Family Education Outcome: Progressing Towards Goal 
  
Problem: Cellulitis Care Plan (Adult) Goal: *Control of acute pain Outcome: Progressing Towards Goal 
Goal: *Skin integrity maintained Outcome: Progressing Towards Goal 
Goal: *Absence of infection signs and symptoms Outcome: Progressing Towards Goal 
  
Problem: Patient Education: Go to Patient Education Activity Goal: Patient/Family Education Outcome: Progressing Towards Goal 
  
Problem: Hypertension Goal: *Blood pressure within specified parameters Outcome: Progressing Towards Goal 
Goal: *Fluid volume balance Outcome: Progressing Towards Goal 
Goal: *Labs within defined limits Outcome: Progressing Towards Goal 
  
Problem: Patient Education: Go to Patient Education Activity Goal: Patient/Family Education Outcome: Progressing Towards Goal 
  
Problem: Surgical Pathway Day of Surgery Goal: Nutrition/Diet Outcome: Progressing Towards Goal 
Goal: Medications Outcome: Progressing Towards Goal 
Goal: Respiratory Outcome: Progressing Towards Goal

## 2021-01-17 NOTE — PROGRESS NOTES
1930 Received bedside report from HUMBERTO Pennington RN. Patient does not show any signs of discomfort. Denies shortness of breath. Rate pain 0/10 on the numeric scale. Bed is at the lowest position, phone, personal items and call light is within reach. 2103 patient is alert and oriented X 4. Denies chest and SOB. Cap refill less than 3 sec's . Lungs are clear  . Skin is warm, dressing to the 25 G IV RAC has old drainage . Andujar drianing. weeping bilateral  lower extremities. + edema, Ace wrap on the right foot is clean dry and intact. Bed is at the lowest position, phone, personal items and call light is within reach. 7616 Reassessment completed . No changes noted see nursing flow sheet. Bed is at the lowest position, phone, personal items and call light is within reach. 5447 spoke to the pharmacy about trough level of 24 
 
0503Reassessment completed . No changes noted see nursing flow sheet. Bed is at the lowest position, phone, personal items and call light is within reach. Shift summary Patient had uneventful night, slept through shift. Waylon Valentin educated about fall prevention, infection control,and pain management. Opportunities for questions and clarifications were given

## 2021-01-17 NOTE — PROGRESS NOTES
1930 Received bedside report from HUMBERTO Caba RN. Patient does not show any signs of discomfort. Denies shortness of breath. Rate pain 0/10 on the numeric scale. Bed is at the lowest position, phone, personal items and call light is within reach. 2103 patient is alert and oriented X 4. Denies chest and SOB. Cap refill less than 3 sec's . Lungs are clear  . Skin is warm, dressing to the 25 G IV RAC has old drainage . Andujar drianing. weeping bilateral  lower extremities. + edema, Ace wrap on the right foot is clean dry and intact. Bed is at the lowest position, phone, personal items and call light is within reach. 3377 Reassessment completed . No changes noted see nursing flow sheet. Bed is at the lowest position, phone, personal items and call light is within reach. 9630 spoke to the pharmacy about trough level of 24 
 
0503Reassessment completed . No changes noted see nursing flow sheet. Bed is at the lowest position, phone, personal items and call light is within reach. Shift summary Patient had uneventful night, slept through shift. Kelin Bowser educated about fall prevention, infection control,and pain management. Opportunities for questions and clarifications were given

## 2021-01-17 NOTE — PROGRESS NOTES
Problem: Falls - Risk of 
Goal: *Absence of Falls Description: Document Larantoine Archana Fall Risk and appropriate interventions in the flowsheet. Outcome: Progressing Towards Goal 
Note: Fall Risk Interventions: 
Mobility Interventions: Bed/chair exit alarm, Patient to call before getting OOB Mentation Interventions: Adequate sleep, hydration, pain control, Bed/chair exit alarm Medication Interventions: Patient to call before getting OOB Elimination Interventions: Bed/chair exit alarm, Call light in reach History of Falls Interventions: Bed/chair exit alarm, Consult care management for discharge planning Problem: Pressure Injury - Risk of 
Goal: *Prevention of pressure injury Description: Document Po Scale and appropriate interventions in the flowsheet. Outcome: Progressing Towards Goal 
Note: Pressure Injury Interventions: 
Sensory Interventions: Assess changes in LOC, Float heels, Keep linens dry and wrinkle-free Moisture Interventions: Absorbent underpads, Contain wound drainage Activity Interventions: Pressure redistribution bed/mattress(bed type), PT/OT evaluation Mobility Interventions: Pressure redistribution bed/mattress (bed type), PT/OT evaluation Nutrition Interventions: Document food/fluid/supplement intake, Offer support with meals,snacks and hydration Friction and Shear Interventions: Minimize layers Problem: Cellulitis Care Plan (Adult) Goal: *Control of acute pain Outcome: Progressing Towards Goal 
  
Problem: Hypertension Goal: *Blood pressure within specified parameters Outcome: Progressing Towards Goal 
  
Problem: Surgical Pathway Day of Surgery Goal: Nutrition/Diet Outcome: Progressing Towards Goal

## 2021-01-17 NOTE — ROUTINE PROCESS
Bedside and Verbal shift change report given to Gonsalo Goss RN (oncoming nurse) by CLYDE Rossi RN (offgoing nurse). Report included the following information SBAR, Kardex, ED Summary, Procedure Summary, Intake/Output, MAR, Recent Results and Med Rec Status.

## 2021-01-17 NOTE — ROUTINE PROCESS
1930 
Bedside and verbal shift change report given to [facility] by Chris Drake RN. Report included SBAR, kardex, MAR, and recent results.

## 2021-01-17 NOTE — PROGRESS NOTES
Hospitalist Progress Note-critical care note Patient: Gadiel Alejandra. MRN: [id number]  CSN: [id number] YOB: 1940  Age: 79 y.o. 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 
ICD-9-CM: 401.9  1/13/2013 Yes Pt was admitted for sob and suspected chf exacerbation, also leg cellilitis and possible toe OM Acute CHF (congestive heart failure) ([facility] (1/13/2021) No sob  echo with ef 40 %, continue to hold lasix F/u with dr. Pippa Mackenzie as out -pt Lymphedema (1/13/2021) No dvt elevated leg Improving Cellulitis and abscess of leg, except foot (1/13/2013) Mild improving IV Zosyn/Vanc Pharmacy dosing 
  
    
Diabetic Foot Ulcer with some discharge from amputated toes site Drain and debridement per Dr. Maggi Sinha, will have transmetatarsal amputation Continue local wound care  
 
  
PAF Continue Eloquis , so far rate controlled per atenolol  
  
laura- ckd3 Cr mild improving Continue to  hold lasix and losartan Continue monitoring cr  
 
DM type II Continue ssi Hypokalemia  
k replacement Subjective : I  Feel good, no diarrhea , no pain Disposition :tbd, Review of systems: 
 
General: No fevers or chills. Cardiovascular: No chest pain or pressure. No palpitations. Pulmonary: No shortness of breath. Gastrointestinal: No nausea, vomiting. Vital signs/Intake and Output: 
Visit Vitals BP (!) 153/77 Pulse 91 Temp 97.7 °F (36.5 °C) Resp 16 Ht 6' (1.829 m) Wt 100.7 kg (222 lb) SpO2 100% BMI 30.11 kg/m² Current Shift:  01/17 0701 - 01/17 1900 In: -  
Out: 737 [SMYAN:021] Last three shifts:  01/15 1901 - 01/17 0700 In: 5038 [P.O.:690; I.V.:763] Out: 2850 [Urine:2850] Physical Exam: 
General: WD, WN. Alert, cooperative, no acute distress HEENT: NC, Atraumatic. PERRLA, anicteric sclerae. Lungs: CTA Bilaterally. No Wheezing/Rhonchi/Rales. Heart:  Regular  rhythm,  No murmur, No Rubs, No Gallops Abdomen: Soft, Non distended, Non tender. +Bowel sounds, Extremities: No c/c. Skin lesion noted on bilateral legs ,improving, rt foot covered wrapped with ace bandage Psych:   Not anxious or agitated. Neurologic:  No acute neurological deficit. Labs: Results:  
   
Chemistry Recent Labs  
  01/17/21 
0615 01/16/21 
0105 01/15/21 
0335 GLU 99 164* 93  140 139  
K 3.0* 3.3* 3.3*  
 105 103 CO2 25 23 25 BUN 61* 60* 48* CREA 2.38* 2.81* 2.15* CA 8.2* 7.9* 8.3* AGAP 10 12 11 BUCR 26* 21* 22* AP  --  109 120* TP  --  6.1* 6.7 ALB  --  2.8* 2.9*  
GLOB  --  3.3 3.8 AGRAT  --  0.8 0.8 CBC w/Diff Recent Labs  
  01/16/21 
0105 01/15/21 
0335 WBC 15.0* 15.5*  
RBC 2.58* 2.86* HGB 7.2* 8.3* HCT 23.6* 26.1*  
 334 GRANS 82* 82* LYMPH 7* 8*  
EOS 2 1 Cardiac Enzymes No results for input(s): CPK, CKND1, DEQUAN in the last 72 hours.  
 
No lab exists for component: Cordell Flores  
 Coagulation No results for input(s): PTP, INR, APTT, INREXT, INREXT in the last 72 hours. Lipid Panel No results found for: CHOL, CHOLPOCT, CHOLX, CHLST, CHOLV, 724183, HDL, HDLP, LDL, LDLC, DLDLP, 596904, VLDLC, VLDL, TGLX, TRIGL, TRIGP, TGLPOCT, CHHD, CHHDX  
BNP No results for input(s): BNPP in the last 72 hours. Liver Enzymes Recent Labs  
  01/16/21 
0105 TP 6.1* ALB 2.8*  Thyroid Studies No results found for: T4, T3U, TSH, TSHEXT, TSHEXT Procedures/imaging: see electronic medical records for all procedures/Xrays and details which were not copied into this note but were reviewed prior to creation of Plan Mri Foot Rt Wo Cont Result Date: 1/14/2021 Procedure: MRI of the right forefoot without contrast. HISTORY: -From Provider: diabetic ulcer -Additional: Ulcer distal plantar right foot Comparisons: None Technique: Sagittal and coronal T1 and STIR; axial T1 and T2 with fat saturation sequences of the forefoot were obtained. Intravenous gadolinium was not requested or obtained. The study is motion degraded even with repeat attempt, which does limit sensitivity. Subtle diagnostic for clinical question. Findings: 2nd digit: There is heterogeneous soft tissue thickening surrounding the distal aspect of the head of the 2nd metatarsal, with surrounding foci of low signal intensity, suspicious for gas, largest dorsal lateral to the 2nd metatarsal head. There is adjacent heterogeneously T1 hypointense, T2/STIR hyperintense thickening in the subcutaneous fat, extending to the skin margin of the 2nd digit, at the site of suspected dressing. This may correspond to the site of the ulcer, but should be clinically correlated. There is diffuse fatty change in the intrinsic foot musculature, with focally heterogeneously hyperintense STIR signal in the soft tissues surrounding the 2nd metatarsal diaphysis. Indistinct cortical margin of the 2nd metatarsal head, subjacent patchy T1 hypointense STIR and T2 hyperintense marrow signal in the head and distal diaphysis of the 2nd metatarsal. 1st digit: Amputation of the phalanges distal to the 1st metatarsal head. Cartilage loss and subchondral marrow hyperintensity in the medial cuneiform subjacent to the 1st tarsal metatarsal joint and mild spurring at the 1st metatarsal-sesamoid joint. The 1st and 2nd phalanges are absent, suggesting prior amputation. There is thickening of the subcutaneous fat and skin at the plantar aspect of the 2nd and 3rd MTP joints. There is skin puckering at the plantar aspect of the foot between the 3rd and 4th MTP joints.  There is dorsiflexion at the MTP and plantarflexion at the DIP joints of the 3rd-5th digits. No acute associated marrow signal abnormalities are seen Impression: Images significant degraded by motion artifact. 1. Findings suspicious for osteomyelitis in the head and neck of the 2nd metatarsal, with surrounding cellulitis. Recommend correlation with clinical and laboratory findings. 2. Foci of low signal intensity in the soft tissues about the head of the 2nd metatarsal are suspicious for gas, and can be seen in the setting of gas forming infection or recent intervention. 3. Edema/myositis in the fatty replaced intrinsic foot musculature about the 2nd metatarsal 4. Plantar subcutaneous fat and skin thickening at the 1st and 2nd metatarsal heads, which can be related to cellulitis or chronic changes. Skin puckering at the plantar 3rd-4th metatarsal interspace is age indeterminate, and can reflect chronic or acute ulcerative etiology. 5. Presumed amputation of the 1st and 2nd phalanges distal to the metatarsal heads. Degenerative changes at the 1st tarsal metatarsal and 1st metatarsal sesamoid joint. Ct Head Wo Cont Result Date: 1/13/2021 EXAM: CT HEAD, WITHOUT IV CONTRAST INDICATION: Lethargy, headache, bilateral lower extremity swelling COMPARISON: None TECHNIQUE: Multiple axial CT images of the head were obtained extending from the skull base through the vertex. Additional coronal and sagittal reformations were also performed. One or more dose reduction techniques were used on this CT: automated exposure control, adjustment of the mAs and/or kVp according to patient size, and iterative reconstruction techniques. The specific techniques used on this CT exam have been documented in the patient's electronic medical record. Digital Imaging and Communications in Medicine (DICOM) format image data are available to nonaffiliated external healthcare facilities or entities on a secure, media free, reciprocally searchable basis with patient authorization for at least a 12-month period after this study. _______________ FINDINGS: BRAIN AND POSTERIOR FOSSA: There are a few small, probably chronic right and left basal ganglia lacunar infarcts. There is generalized prominence of the ventricular system, associated with proportional widening of the cortical cerebral sulci, compatible with generalized volume loss. Hazy hypoattenuation identified along the periventricular white matter, a nonspecific finding which is most commonly encountered in the setting of chronic microvascular disease. There is no intracranial hemorrhage, mass effect, or midline shift. There are no significant additional areas of abnormal parenchymal attenuation. EXTRA-AXIAL SPACES AND MENINGES: There are no abnormal extra-axial fluid collections. CALVARIUM: No acute osseous abnormality. OTHER: The visualized portions of the paranasal sinuses and mastoid air cells are clear. _______________ IMPRESSION: 1. No CT evidence of an acute intracranial abnormality - in particular, no acute cortical infarct, hemorrhage, or mass effect. 2.  Mild cerebral atrophy/volume loss and periventricular white matter changes, most commonly seen with chronic microvascular disease. Xr Chest [facility] Result Date: 1/13/2021 EXAM: XR CHEST PORT CLINICAL INDICATION/HISTORY: gen weakness -Additional: None COMPARISON: 1/8/2021 TECHNIQUE: Portable frontal view of the chest _______________ FINDINGS: SUPPORT DEVICES: None. HEART AND MEDIASTINUM: Cardiomediastinal silhouette within normal limits. LUNGS AND PLEURAL SPACES: Hypoinflated lungs. No dense consolidation, large effusion or pneumothorax. _______________ IMPRESSION: No acute cardiopulmonary abnormality. Xr Chest [facility] Result Date: 1/8/2021 EXAM: XR CHEST PORT CLINICAL INDICATION/HISTORY: Chest pain -Additional: None COMPARISON: 11/17/2014 TECHNIQUE: Portable frontal view of the chest _______________ FINDINGS: SUPPORT DEVICES: None. HEART AND MEDIASTINUM: Cardiomegaly. LUNGS AND PLEURAL SPACES: No dense consolidation, large effusion or pneumothorax. _______________ IMPRESSION: Cardiomegaly. No acute radiographic abnormality. Duplex Lower Ext Venous Bilat Result Date: 1/13/2021 · No evidence of deep vein thrombosis in the right lower extremity veins assessed. · No evidence of deep vein thrombosis in the left lower extremity veins assessed. Patency of mid to distal femoral vein bilaterally is done by the color flow and doppler signal only Duplex Lower Ext Artery Bilat Result Date: 1/14/2021 No evidence of any significant peripheral arterial disease in the bilateral lower extremity. Triphasic waveforms noted throughout.  
 
 
Blanche Stephens MD

## 2021-01-17 NOTE — PROGRESS NOTES
Progress Note Patient: Carlos Frazier. Sex: male          DOA: 1/13/2021 YOB: 1940      Age:  79 y.o.        LOS:  LOS: 4 days Subjective:  
Patient seen today for follow up. Medications:  
 
Current Facility-Administered Medications:  
  [START ON 1/18/2021] Vancomycin random level due on 1/18/21 at 01:00, 1 Each, Other, ONCE, Alison Albert MD 
  piperacillin-tazobactam (ZOSYN) 2.25 g in 0.9% sodium chloride (MBP/ADV) 50 mL MBP, 2.25 g, IntraVENous, Q6H, Alison Albert MD, Last Rate: 100 mL/hr at 01/17/21 0953, 2.25 g at 01/17/21 7402   [Held by provider] furosemide (LASIX) tablet 20 mg, 20 mg, Oral, DAILY, Royal Homar MD, 20 mg at 01/16/21 8643   oxyCODONE-acetaminophen (PERCOCET) 5-325 mg per tablet 1 Tab, 1 Tab, Oral, Q4H PRN, Abe Kessler MD 
  latanoprost (XALATAN) 0.005 % ophthalmic solution 1 Drop, 1 Drop, Both Eyes, QPM, Royal Homar MD, 1 Drop at 01/16/21 1747 
  insulin lispro (HUMALOG) injection, , SubCUTAneous, AC&HS, Royal Homar MD, Stopped at 01/16/21 1630 
  glucose chewable tablet 16 g, 4 Tab, Oral, PRN, Royal Homar MD 
  glucagon [facility]) injection 1 mg, 1 mg, IntraMUSCular, PRN, Royal Homar MD 
  dextrose 10% infusion 125-250 mL, 125-250 mL, IntraVENous, PRN, Royal Homar MD 
  sodium chloride (NS) flush 5-10 mL, 5-10 mL, IntraVENous, PRN, Shahriar Cornejo DO 
  apixaban [facility]) tablet 2.5 mg, 2.5 mg, Oral, BID, Alison Albert MD, 2.5 mg at 01/17/21 2086   atenoloL (TENORMIN) tablet 50 mg, 50 mg, Oral, QPM, Alison Albert MD, 50 mg at 01/16/21 6394   [Held by provider] potassium chloride (KLOR-CON) tablet 10 mEq, 10 mEq, Oral, BID, Alison Albert MD, 10 mEq at 01/16/21 8746   sodium chloride (NS) flush 5-40 mL, 5-40 mL, IntraVENous, Q8H, Alison Albert MD, 10 mL at 01/17/21 0600   sodium chloride (NS) flush 5-40 mL, 5-40 mL, IntraVENous, PRN, Christiano Albert MD 
  acetaminophen (TYLENOL) tablet 650 mg, 650 mg, Oral, Q6H PRN, 650 mg at 01/16/21 1529 **OR** acetaminophen (TYLENOL) suppository 650 mg, 650 mg, Rectal, Q6H PRN, Christiano Albert MD 
  polyethylene glycol (MIRALAX) packet 17 g, 17 g, Oral, DAILY PRN, Christiano Albert MD 
  promethazine (PHENERGAN) tablet 12.5 mg, 12.5 mg, Oral, Q6H PRN **OR** ondansetron (ZOFRAN) injection 4 mg, 4 mg, IntraVENous, Q6H PRN, Christiano Albert MD 
  Vancomycin - Pharmacy to Dose, 1 Each, Other, Rx Dosing/Monitoring, Christiano Albert MD 
 
Review of Systems Negative for chest pain and shortness of breath Objective:  
  
Visit Vitals BP (!) 154/76 Pulse 89 Temp 97.7 °F (36.5 °C) Resp 16 Ht 6' (1.829 m) Wt 100.7 kg (222 lb) SpO2 100% BMI 30.11 kg/m² Physical Exam: 
Strikethrough bleeding noted. Wound macerated but no sign of infection Labs: 
Recent Results (from the past 24 hour(s)) GLUCOSE, POC Collection Time: 01/16/21 12:15 PM  
Result Value Ref Range Glucose (POC) 209 (H) 70 - 110 mg/dL SARS-COV-2 Collection Time: 01/16/21  3:40 PM  
Result Value Ref Range SARS-CoV-2 PENDING Specimen source Nasopharyngeal    
 Specimen type NP Swab Health status Nasopharyngeal    
GLUCOSE, POC Collection Time: 01/16/21  5:10 PM  
Result Value Ref Range Glucose (POC) 95 70 - 110 mg/dL GLUCOSE, POC Collection Time: 01/16/21  9:11 PM  
Result Value Ref Range Glucose (POC) 139 (H) 70 - 110 mg/dL Roberto Cardinal Collection Time: 01/17/21  1:30 AM  
Result Value Ref Range Vancomycin,trough 24.0 (HH) 10.0 - 20.0 ug/mL Reported dose date [id number] Reported dose time: 0589 Reported dose: 1250 MG UNITS  
GLUCOSE, POC Collection Time: 01/17/21  6:12 AM  
Result Value Ref Range Glucose (POC) 112 (H) 70 - 110 mg/dL MAGNESIUM  
 Collection Time: 01/17/21  6:15 AM  
Result Value Ref Range Magnesium 2.2 1.6 - 2.6 mg/dL METABOLIC PANEL, BASIC Collection Time: 01/17/21  6:15 AM  
Result Value Ref Range Sodium 143 136 - 145 mmol/L Potassium 3.0 (L) 3.5 - 5.5 mmol/L Chloride 108 100 - 111 mmol/L  
 CO2 25 21 - 32 mmol/L Anion gap 10 3.0 - 18 mmol/L Glucose 99 74 - 99 mg/dL BUN 61 (H) 7.0 - 18 MG/DL Creatinine 2.38 (H) 0.6 - 1.3 MG/DL  
 BUN/Creatinine ratio 26 (H) 12 - 20 GFR est AA 32 (L) >60 ml/min/1.73m2 GFR est non-AA 26 (L) >60 ml/min/1.73m2 Calcium 8.2 (L) 8.5 - 10.1 MG/DL Assessment/Plan Principal Problem: 
  Cellulitis and abscess of leg, except foot (1/13/2013) Active Problems: 
  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 Spoke with patient regarding transmetatarsal amputation tomorrow or Tues. Most likely Tuesday bc wound infection still resolving. Dressing changed today.

## 2021-01-18 NOTE — ANESTHESIA POSTPROCEDURE EVALUATION
Post-Anesthesia Evaluation & Assessment Visit Vitals BP (!) 156/93 Pulse 87 Temp 36.4 °C (97.5 °F) Resp 18 Ht 6' (1.829 m) Wt 100.7 kg (222 lb) SpO2 99% BMI 30.11 kg/m² Nausea/Vomiting: no nausea and no vomiting Post-operative hydration adequate. Pain score (VAS): 0 Mental status & Level of consciousness: alert and oriented x 3 Neurological status: moves all extremities, sensation grossly intact Pulmonary status: airway patent, no supplemental oxygen required Complications related to anesthesia: none Patient has met all discharge requirements. Additional comments: 
Procedure(s): RIGHT FOOT IRRIGATION AND DEBRIDEMENT, REMOVAL OF SECOND METATARSAL. general 
 
<BSHSIANPOST> INITIAL Post-op Vital signs:  
Vitals Value Taken Time /73 01/15/21 1630 Temp 36.2 °C (97.2 °F) 01/15/21 1543 Pulse 90 01/15/21 1630 Resp 16 01/15/21 1630 SpO2 100 % 01/15/21 1630

## 2021-01-18 NOTE — PROGRESS NOTES
Chart reviewed plan is for amputation today or tomorrow per podiatry progress note, cm will remain available for d/c planning, can be contacted at [address] [place] [address] if needed.

## 2021-01-18 NOTE — PROGRESS NOTES
Received call from RN, requested to have a consent from patient for surgery vs get clearance for surgery.  The writer told RN, consent is supposed from the provider who will perform the surgery, pt needs to have clearance from cardiologist.

## 2021-01-18 NOTE — PROGRESS NOTES
Assessment completed at this time. Pt denies chest pain or SOB. Pt denies any numbness or tingling to extremities. Fall risk band in place. Pt Denies pain at this time. Pt has 4+ edema and pitting in LLE with scabbing and one weeping sore all open to air. Ace bandage and curlex to RLE. Pt educated on side effects of medication. Pt encouraged to manage pain and call for assistance. Pt left in bed with call light within reach, bed in low position and wheels locked. Will continue to monitor. Pt left in the room no signs of distress. Pt educated on side effects of medication . Pt pain managed by PRN medication per MAR. All Pt's concerns addressed.

## 2021-01-18 NOTE — PROGRESS NOTES
6056 Received bedside shift report from Perez Larkin RN. Report included the following information SBAR, Kardex, Intake/Output, MAR and Recent Results. 1121 Documented call from lab regarding MRSA. Informed Dr. Jung Setting. Contact precautions put in place. CT likely around 1330. 
 
1415 Pt off the floor for CT. 
 
1605 Received consent from (wife) Aggie Hernandez for blood transfusion over telephone. Pt exhibiting some periodic confusion. Secondary nurse Anthony Sarkar RN. 
 
0021 Received telephone order from Dr. Luis Mckeon with readback to remove NPO status and go back to consist carb diet. Helped pt order dinner. 1800 Patient confusion seems to be limited to having side conversations with his wife Hernan Mariscal. But he knew it was January 2021. He can clearly tell you about his life history. He understands his condition. He had questions about his care. I asked him many open ended questions to which he clearly had a verifiable time sequence according to his wife when I talked to her on the phone. But then randomly he would disrobe and talk to his wife in the room who was absent. 2000 Gave bedside shift report to oncoming nurse Vaishnavi Zurita RN. Report included the following information SBAR, Kardex, Intake/Output, MAR and Recent Results.

## 2021-01-18 NOTE — PROGRESS NOTES
Problem: Falls - Risk of 
Goal: *Absence of Falls Description: Document Addy Gila Fall Risk and appropriate interventions in the flowsheet. Outcome: Progressing Towards Goal 
Note: Fall Risk Interventions: 
Mobility Interventions: Communicate number of staff needed for ambulation/transfer Mentation Interventions: Adequate sleep, hydration, pain control Medication Interventions: Patient to call before getting OOB Elimination Interventions: Call light in reach History of Falls Interventions: Door open when patient unattended

## 2021-01-18 NOTE — PROGRESS NOTES
Hospitalist Progress Note-critical care note Patient: Peewee Yanez MRN: [id number]  CSN: [id number] YOB: 1940  Age: 79 y.o. 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 
ICD-9-CM: 401.9  1/13/2013 Yes Pt was admitted for sob and suspected chf exacerbation, also leg cellilitis and possible toe OM Acute CHF (congestive heart failure) ([facility] (1/13/2021) No sob  echo with ef 40 %, continue to hold lasix due to elevated cr  
Cardiologist consult Lymphedema (1/13/2021) No dvt elevated leg Improving Cellulitis Mild improving IV Zosyn/Vanc Pharmacy dosing 
  
    
Diabetic Foot Ulcer with some discharge from amputated toes site Drain and debridement per Dr. Sari Helms, will have transmetatarsal amputation 20th  
Continue local wound care  
 
  
PAF 
eliquis hold, heparin gtt , will restart eliquis after the procedure  atenolol  
  
laura- ckd3 Cr mild improving Continue to  hold lasix and losartan Continue monitoring cr  
 
 DM type II Continue ssi Hypokalemia  
k replacement Delirium AM  
Ct head no acute issue, continue monitoring Add norvasc for bp control , hydralazine Will one unit blood transfusion Subjective : I  Am at the [facility] Rn: wound cx mrsa Disposition :tbd, Review of systems: 
 
General: No fevers or chills. Cardiovascular: No chest pain or pressure. No palpitations. Pulmonary: No shortness of breath. Gastrointestinal: No nausea, vomiting. Vital signs/Intake and Output: 
Visit Vitals BP (!) 198/107 Pulse 91 Temp 97.8 °F (36.6 °C) Resp 16 Ht 6' (1.829 m) Wt 99.7 kg (219 lb 11.2 oz) SpO2 100% BMI 29.80 kg/m² Current Shift:  No intake/output data recorded. Last three shifts:  01/16 1901 - 01/18 0700 In: 210 [P.O.:150; I.V.:60] Out: 2650 [Urine:2650] Physical Exam: 
General: WD, WN. Alert, cooperative, no acute distress HEENT: NC, Atraumatic. PERRLA, anicteric sclerae. Lungs: CTA Bilaterally. No Wheezing/Rhonchi/Rales. Heart:  Regular  rhythm,  No murmur, No Rubs, No Gallops Abdomen: Soft, Non distended, Non tender. +Bowel sounds, Extremities: No c/c. Skin lesion noted on bilateral legs ,improving, rt foot covered wrapped with ace bandage Psych:   Not anxious or agitated. Neurologic:  No acute neurological deficit. Labs: Results:  
   
Chemistry Recent Labs  
  01/18/21 
0132 01/17/21 
8980 01/16/21 
0105 GLU 97 99 164*  143 140  
K 3.4* 3.0* 3.3*  
 108 105 CO2 26 25 23 BUN 61* 61* 60* CREA 2.23* 2.38* 2.81* CA 8.2* 8.2* 7.9* AGAP 7 10 12 BUCR 27* 26* 21* AP  --   --  109 TP  --   --  6.1* ALB  --   --  2.8*  
GLOB  --   --  3.3 AGRAT  --   --  0.8 CBC w/Diff Recent Labs  
  01/17/21 
1430 01/16/21 
0105 WBC  --  15.0*  
RBC  --  2.58* HGB 7.1* 7.2* HCT 22.5* 23.6* PLT  --  317 GRANS  --  82* LYMPH  --  7* EOS  --  2  
  
 Cardiac Enzymes No results for input(s): CPK, CKND1, DEQUAN in the last 72 hours. 
 
No lab exists for component: CKRMB, TROIP  
Coagulation No results for input(s): PTP, INR, APTT, INREXT, INREXT in the last 72 hours.   
Lipid Panel No results found for: CHOL, CHOLPOCT, CHOLX, CHLST, CHOLV, 297723, HDL, HDLP, LDL, LDLC, DLDLP, 597735, VLDLC, VLDL, TGLX, TRIGL, TRIGP, TGLPOCT, CHHD, CHHDX  
BNP No results for input(s): BNPP in the last 72 hours.  
Liver Enzymes Recent Labs  
  01/16/21 
0105  
TP 6.1*  
ALB 2.8*  
  
  
Thyroid Studies No results found for: T4, T3U, TSH, TSHEXT, TSHEXT    
Procedures/imaging: see electronic medical records for all procedures/Xrays and details which were not copied into this note but were reviewed prior to creation of Plan 
 
Mri Foot Rt Wo Cont 
 
Result Date: 1/14/2021 
 Procedure: MRI of the right forefoot without contrast. HISTORY: -From Provider: diabetic ulcer -Additional: Ulcer distal plantar right foot Comparisons: None Technique: Sagittal and coronal T1 and STIR; axial T1 and T2 with fat saturation sequences of the forefoot were obtained. Intravenous gadolinium was not requested or obtained. The study is motion degraded even with repeat attempt, which does limit sensitivity. Subtle diagnostic for clinical question. Findings: 2nd digit: There is heterogeneous soft tissue thickening surrounding the distal aspect of the head of the 2nd metatarsal, with surrounding foci of low signal intensity, suspicious for gas, largest dorsal lateral to the 2nd metatarsal head. There is adjacent heterogeneously T1 hypointense, T2/STIR hyperintense thickening in the subcutaneous fat, extending to the skin margin of the 2nd digit, at the site of suspected dressing. This may correspond to the site of the ulcer, but should be clinically correlated. There is diffuse fatty change in the intrinsic foot musculature, with focally heterogeneously hyperintense STIR signal in the soft tissues surrounding the 2nd metatarsal diaphysis. Indistinct cortical margin of the 2nd metatarsal head, subjacent patchy T1 hypointense STIR and T2 hyperintense marrow signal in the head and distal diaphysis of the 2nd metatarsal. 1st digit: Amputation of the phalanges distal to the 1st metatarsal head. Cartilage loss and subchondral marrow hyperintensity in the medial cuneiform subjacent to the 1st tarsal metatarsal joint and mild spurring at the 1st metatarsal-sesamoid joint. The 1st and 2nd phalanges are absent, suggesting prior amputation. There is thickening of the subcutaneous fat and skin at the plantar aspect of the 2nd and 3rd MTP joints. There is skin puckering at the plantar aspect of the foot between the 3rd and 4th MTP joints.  There is dorsiflexion at the MTP and plantarflexion at the DIP joints of the 3rd-5th digits. No acute associated marrow signal abnormalities are seen Impression: Images significant degraded by motion artifact. 1. Findings suspicious for osteomyelitis in the head and neck of the 2nd metatarsal, with surrounding cellulitis. Recommend correlation with clinical and laboratory findings. 2. Foci of low signal intensity in the soft tissues about the head of the 2nd metatarsal are suspicious for gas, and can be seen in the setting of gas forming infection or recent intervention. 3. Edema/myositis in the fatty replaced intrinsic foot musculature about the 2nd metatarsal 4. Plantar subcutaneous fat and skin thickening at the 1st and 2nd metatarsal heads, which can be related to cellulitis or chronic changes. Skin puckering at the plantar 3rd-4th metatarsal interspace is age indeterminate, and can reflect chronic or acute ulcerative etiology. 5. Presumed amputation of the 1st and 2nd phalanges distal to the metatarsal heads. Degenerative changes at the 1st tarsal metatarsal and 1st metatarsal sesamoid joint. Ct Head Wo Cont Result Date: 1/13/2021 EXAM: CT HEAD, WITHOUT IV CONTRAST INDICATION: Lethargy, headache, bilateral lower extremity swelling COMPARISON: None TECHNIQUE: Multiple axial CT images of the head were obtained extending from the skull base through the vertex. Additional coronal and sagittal reformations were also performed. One or more dose reduction techniques were used on this CT: automated exposure control, adjustment of the mAs and/or kVp according to patient size, and iterative reconstruction techniques. The specific techniques used on this CT exam have been documented in the patient's electronic medical record. Digital Imaging and Communications in Medicine (DICOM) format image data are available to nonaffiliated external healthcare facilities or entities on a secure, media free, reciprocally searchable basis with patient authorization for at least a 12-month period after this study. _______________ FINDINGS: BRAIN AND POSTERIOR FOSSA: There are a few small, probably chronic right and left basal ganglia lacunar infarcts. There is generalized prominence of the ventricular system, associated with proportional widening of the cortical cerebral sulci, compatible with generalized volume loss. Hazy hypoattenuation identified along the periventricular white matter, a nonspecific finding which is most commonly encountered in the setting of chronic microvascular disease. There is no intracranial hemorrhage, mass effect, or midline shift. There are no significant additional areas of abnormal parenchymal attenuation. EXTRA-AXIAL SPACES AND MENINGES: There are no abnormal extra-axial fluid collections. CALVARIUM: No acute osseous abnormality. OTHER: The visualized portions of the paranasal sinuses and mastoid air cells are clear. _______________ IMPRESSION: 1. No CT evidence of an acute intracranial abnormality - in particular, no acute cortical infarct, hemorrhage, or mass effect. 2.  Mild cerebral atrophy/volume loss and periventricular white matter changes, most commonly seen with chronic microvascular disease. Xr Chest [facility] Result Date: 1/13/2021 EXAM: XR CHEST PORT CLINICAL INDICATION/HISTORY: gen weakness -Additional: None COMPARISON: 1/8/2021 TECHNIQUE: Portable frontal view of the chest _______________ FINDINGS: SUPPORT DEVICES: None. HEART AND MEDIASTINUM: Cardiomediastinal silhouette within normal limits. LUNGS AND PLEURAL SPACES: Hypoinflated lungs. No dense consolidation, large effusion or pneumothorax. _______________ IMPRESSION: No acute cardiopulmonary abnormality. Xr Chest [facility] Result Date: 1/8/2021 EXAM: XR CHEST PORT CLINICAL INDICATION/HISTORY: Chest pain -Additional: None COMPARISON: 11/17/2014 TECHNIQUE: Portable frontal view of the chest _______________ FINDINGS: SUPPORT DEVICES: None. HEART AND MEDIASTINUM: Cardiomegaly. LUNGS AND PLEURAL SPACES: No dense consolidation, large effusion or pneumothorax. _______________ IMPRESSION: Cardiomegaly. No acute radiographic abnormality. Duplex Lower Ext Venous Bilat Result Date: 1/13/2021 · No evidence of deep vein thrombosis in the right lower extremity veins assessed. · No evidence of deep vein thrombosis in the left lower extremity veins assessed. Patency of mid to distal femoral vein bilaterally is done by the color flow and doppler signal only Duplex Lower Ext Artery Bilat Result Date: 1/14/2021 No evidence of any significant peripheral arterial disease in the bilateral lower extremity. Triphasic waveforms noted throughout.  
 
 
Maryjane Torres MD

## 2021-01-18 NOTE — PROGRESS NOTES
Problem: Mobility Impaired (Adult and Pediatric) Goal: *Acute Goals and Plan of Care (Insert Text) Description: Physical Therapy Goals Initiated 1/14/2021 and to be accomplished within 7 day(s) 1. Patient will move from supine <> sit with SBA in prep for out of bed activity and change of position. 2.  Patient will perform sit<> stand with CGA/min A/RW in prep for transfers/ambulation. 3.  Patient will transfer from bed <> chair with CGA/min A/RW for time up in chair for completion of ADL activity. 4.  Patient will ambulate 50 feet with CGA/MIN A/RW for improved functional mobility at discharge. 5.  Patient will ascend/descend 3-5 stairs with handrail(s) with minimal assistance/contact guard assist for home re-entry as needed. 1/18/2021 1724 by Beau Epley, PT Note: Physical Therapy Attempt Chart reviewed. Pt unable to participate in physical therapy session due to: 
 
[]  Eating meal 
[]  Nausea 
[]  Dizziness 
[]  Lethargy 
[]  Lab Results [x]  Blood Transfusion 
[]  Dialysis []  Pain 
[x]  Other:  Cancelled surgery for today, in need of blood transfusion. Cont to hold PT for today. Will attempt tomorrow as pt schedule allows.  
 
Donnie Vidal PT

## 2021-01-18 NOTE — PROGRESS NOTES
Patient Name: Naif Michaud. Age: 79 y.o. Sex:  male YOB: 1940 Medical Record Number: [id number] Antimicrobial  Vancomycin Indication SSTI Dose / Interval  
 
 
 
 
Current Antimicrobial Therapy (168h ago, onward) Ordered     Start Stop  
 01/18/21 0232  vancomycin (VANCOCIN) 1,000 mg in 0.9% sodium chloride 250 mL (VIAL-MATE)  1,000 mg,   IntraVENous,   EVERY 24 HOURS    
 01/18/21 0300 --  
 01/15/21 0852  piperacillin-tazobactam (ZOSYN) 2.25 g in 0.9% sodium chloride (MBP/ADV) 50 mL MBP  2.25 g,   IntraVENous,   EVERY 6 HOURS    
 01/15/21 1300 01/20/21 1459 Last Level (if applicable) Lab Results Component Value Date/Time Reported dose: 1250 MG 01/17/2021 01:30 AM  
 Reported dose time: 0769 01/17/2021 01:30 AM  
 Reported dose date [id number] 01/17/2021 01:30 AM  
 
Vancomycin Lab Results Component Value Date/Time Vancomycin,trough 24.0 (HH) 01/17/2021 01:30 AM  
 Vancomycin, random 20.1 01/18/2021 01:32 AM  
  
Gentamicin No results found for: GENP, GENT, GENR] Tobramycin No results found for: TOBP, TOBT, TOBR Amikacin No results found for: Kati Powell, HEAVEN, Jessica Vargas Cultures (7 most recent) GRAM STAIN Date Value Ref Range Status 01/15/2021 OCCASIONAL WBCS SEEN   Preliminary 01/15/2021 NO ORGANISMS SEEN   Preliminary Culture result:  
Date Value Ref Range Status 01/15/2021 NO ANAEROBES ISOLATED   Final  
01/15/2021 CULTURE IN PROGRESS,FURTHER UPDATES TO FOLLOW   Preliminary 01/13/2021 NO GROWTH 4 DAYS   Preliminary 01/13/2021 NO GROWTH 4 DAYS   Preliminary 09/20/2017 NO GROWTH 6 DAYS   Final  
11/20/2014 NO GROWTH 1 DAY   Final  
11/17/2014 NO GROWTH 6 DAYS   Final  
11/17/2014 NO GROWTH 6 DAYS   Final  
  
Renal Overview (72 hr) Recent Labs  
  01/18/21 
0132 01/17/21 
0615 01/16/21 
0105 BUN 61* 61* 60* CREA 2.23* 2.38* 2.81* CrCl (Current): Estimated Creatinine Clearance: 31.9 mL/min (A) (based on SCr of 2.23 mg/dL (H)). Lactic Acid (Sepsis Criteria: >2.0 mmol/L) Lab Results Component Value Date/Time Lactic acid 1.8 2014 04:28 PM  
  
Procalcitonin (0.10-0.49 NG/ML) No results found for: PCT Hepatic Overview (72 hr) Recent Labs  
  21 
0105 01/15/21 
0335 ALT 23 25  120* Temp (24-hr Max) Temp (24hrs), Av.8 °F (36.6 °C), Min:97.7 °F (36.5 °C), Max:97.9 °F (36.6 °C) Hematology Recent Labs  
  21 
1430 21 
0105 01/15/21 
0335 WBC  --  15.0* 15.5* HGB 7.1* 7.2* 8.3* HCT 22.5* 23.6* 26.1*  
PLT  --  317 334 GRANS  --  82* 82* ANEU  --  12.2* 12.8* Assessment Vancomycin random level- 20.1mcg/ml on 21. Patient is supratherapeutic. Goal trough- 10-15mcg/ml Previous regimen: 1250mg IV q24h Plan  Will adjust dose to 1000mg IV q24h Jasmina Koehler, 93 Anali Clark Pharmacist 
[phone]

## 2021-01-18 NOTE — PROGRESS NOTES
Problem: Mobility Impaired (Adult and Pediatric) Goal: *Acute Goals and Plan of Care (Insert Text) Description: Physical Therapy Goals Initiated 1/14/2021 and to be accomplished within 7 day(s) 1. Patient will move from supine <> sit with SBA in prep for out of bed activity and change of position. 2.  Patient will perform sit<> stand with CGA/min A/RW in prep for transfers/ambulation. 3.  Patient will transfer from bed <> chair with CGA/min A/RW for time up in chair for completion of ADL activity. 4.  Patient will ambulate 50 feet with CGA/MIN A/RW for improved functional mobility at discharge. 5.  Patient will ascend/descend 3-5 stairs with handrail(s) with minimal assistance/contact guard assist for home re-entry as needed. 1/17/2021 2326 by Linda Shipley PTA Outcome: Progressing Towards Goal 
  
PHYSICAL THERAPY TREATMENT Patient: Lesli Cast (79 y.o. male) Date: 1/17/2021 Diagnosis: Acute CHF (congestive heart failure) ([facility] [I50.9] Lymphedema [I89.0] Cellulitis and abscess of leg, except foot Procedure(s) (LRB): 
RIGHT FOOT IRRIGATION AND DEBRIDEMENT, REMOVAL OF SECOND METATARSAL (Right) 2 Days Post-Op Precautions: Fall Chart, physical therapy assessment, plan of care and goals were reviewed. ASSESSMENT: 
Pt requiring max/total assist with bed mobility and trnsition to sitting. Sitting balance is poor. Pt with poor insight into deficits, frequently requesting to ambulate to restroom. Repeated reorienting to R foot situation and inability to WB effectively. Placement will definitely be needed at D/c. Will follow through upcoming surgery, for assist with placement. Progression toward goals: 
[]      Improving appropriately and progressing toward goals [x]      Improving slowly and progressing toward goals 
[]      Not making progress toward goals and plan of care will be adjusted PLAN: 
 Patient continues to benefit from skilled intervention to address the above impairments. Continue treatment per established plan of care. Discharge Recommendations:  Anup Stovall Further Equipment Recommendations for Discharge:  TBD SUBJECTIVE:  
Patient stated I have to have surgery to clean up the rest of the infection.  OBJECTIVE DATA SUMMARY:  
Critical Behavior: 
Neurologic State: Alert Orientation Level: Oriented X4 Cognition: Follows commands Safety/Judgement: Awareness of environment, Fall prevention Functional Mobility Training: 
Bed Mobility: 
Rolling: Maximum assistance; Total assistance Supine to Sit: Maximum assistance; Total assistance Sit to Supine: Moderate assistance;Maximum assistance Scooting: Total assistance Transfers: 
Unable to stand or scoot Balance: 
Sitting: Impaired Sitting - Static: Poor (constant support) Sitting - Dynamic: None Pain: 
Pain Scale 1: Numeric (0 - 10) Pain Intensity 1: 0 Pain out: 0 Activity Tolerance:  
Fair Please refer to the flowsheet for vital signs taken during this treatment. After treatment:  
[] Patient left in no apparent distress sitting up in chair 
[x] Patient left in no apparent distress in bed 
[x] Call bell left within reach [x] Nursing notified 
[] Caregiver present [x] Bed alarm activated Duyen Cardoza PTA Time Calculation: 31 mins

## 2021-01-18 NOTE — PROGRESS NOTES
Problem: Mobility Impaired (Adult and Pediatric) Goal: *Acute Goals and Plan of Care (Insert Text) Description: Physical Therapy Goals Initiated 1/14/2021 and to be accomplished within 7 day(s) 1. Patient will move from supine <> sit with SBA in prep for out of bed activity and change of position. 2.  Patient will perform sit<> stand with CGA/min A/RW in prep for transfers/ambulation. 3.  Patient will transfer from bed <> chair with CGA/min A/RW for time up in chair for completion of ADL activity. 4.  Patient will ambulate 50 feet with CGA/MIN A/RW for improved functional mobility at discharge. 5.  Patient will ascend/descend 3-5 stairs with handrail(s) with minimal assistance/contact guard assist for home re-entry as needed. Note: Physical Therapy Attempt Chart reviewed. Held physical therapy session today due to: 
 
[]  Eating meal 
[]  Nausea 
[]  Dizziness 
[]  Lethargy 
[]  Lab Results 
[]  Blood Transfusion 
[]  Dialysis []  Pain 
[x]  Other:  Pt off the floor to CT. Pt is scheduled for R foot transmetarsal amputation later in day. Will attempt tomorrow as pt schedule allows. Will need WB order for R foot after scheduled surgery.  
 
Kb Sagastume PT

## 2021-01-18 NOTE — ROUTINE PROCESS
Bedside and Verbal shift change report given to Kelin Resendiz RN (oncoming nurse) by Ro Ward RN (offgoing nurse). Report included the following information SBAR and Kardex.

## 2021-01-19 NOTE — PROGRESS NOTES
Problem: Falls - Risk of 
Goal: *Absence of Falls Description: Document Yael Dominguez Fall Risk and appropriate interventions in the flowsheet. Outcome: Progressing Towards Goal 
Note: Fall Risk Interventions: 
Mobility Interventions: Assess mobility with egress test 
 
Mentation Interventions: Adequate sleep, hydration, pain control Medication Interventions: Patient to call before getting OOB Elimination Interventions: Bed/chair exit alarm History of Falls Interventions: Bed/chair exit alarm Problem: Patient Education: Go to Patient Education Activity Goal: Patient/Family Education Outcome: Progressing Towards Goal 
  
Problem: Pressure Injury - Risk of 
Goal: *Prevention of pressure injury Description: Document Po Scale and appropriate interventions in the flowsheet. Outcome: Progressing Towards Goal 
Note: Pressure Injury Interventions: 
Sensory Interventions: Assess changes in LOC Moisture Interventions: Absorbent underpads Activity Interventions: Pressure redistribution bed/mattress(bed type) Mobility Interventions: Pressure redistribution bed/mattress (bed type) Nutrition Interventions: Document food/fluid/supplement intake, Offer support with meals,snacks and hydration Friction and Shear Interventions: HOB 30 degrees or less Problem: Patient Education: Go to Patient Education Activity Goal: Patient/Family Education Outcome: Progressing Towards Goal 
  
Problem: Patient Education: Go to Patient Education Activity Goal: Patient/Family Education Outcome: Progressing Towards Goal 
  
Problem: Cellulitis Care Plan (Adult) Goal: *Control of acute pain Outcome: Progressing Towards Goal 
Goal: *Skin integrity maintained Outcome: Progressing Towards Goal 
  
Problem: Patient Education: Go to Patient Education Activity Goal: Patient/Family Education Outcome: Progressing Towards Goal

## 2021-01-19 NOTE — CONSULTS
[facility] Consult Note Patient: Lionel Fraga. MRN: [id number]  SSN: [id number] YOB: 1940  Age: 79 y.o. Sex: male Date of Consultation: 1/18/2021 Referring Physician: Dr. Willian Green. 
Reason for Consultation: Preoperative cardiac clearance HPI: 
I was asked by  for preoperative cardiac clearance. Lionel Fraga. is a 62-year-old pleasant gentleman who is being scheduled for right foot toe amputation. Patient denied any chest pain, shortness of breath, patient have chronic lower extremity edema. Patient also have recent cardiac clearance done with his primary cardiologist for other toe surgery which was done recently. Patient have history of chronic atrial fibrillation on anticoagulation and rate control with atenolol. Patient is on Eliquis that was on hold for right foot surgery. Past Medical History:  
Diagnosis Date  ARF (acute renal failure) ([facility] [address] 6/3/2013  Arrhythmia A-Fib  Arthritis  Atrial fibrillation ([facility] [address]  Cellulitis  Hypertension  Unspecified sleep apnea   
 recently notified doesn't need c-pap Past Surgical History:  
Procedure Laterality Date  HX CATARACT REMOVAL    
 HX ORTHOPAEDIC    
 bilateral knee replacments  HX ORTHOPAEDIC  2017  
 betancourt procedure Current Facility-Administered Medications Medication Dose Route Frequency  vancomycin (VANCOCIN) 1,000 mg in 0.9% sodium chloride 250 mL (VIAL-MATE)  1,000 mg IntraVENous Q24H  
 0.9% sodium chloride infusion 250 mL  250 mL IntraVENous PRN  
 hydrALAZINE (APRESOLINE) tablet 25 mg  25 mg Oral Q8H PRN  
 amLODIPine (NORVASC) tablet 5 mg  5 mg Oral DAILY  heparin (porcine) 25,000 units in 0.45% saline 250 ml infusion  18-36 Units/kg/hr IntraVENous TITRATE  piperacillin-tazobactam (ZOSYN) 2.25 g in 0.9% sodium chloride (MBP/ADV) 50 mL MBP  2.25 g IntraVENous Q6H  
 [Held by provider] furosemide (LASIX) tablet 20 mg  20 mg Oral DAILY  oxyCODONE-acetaminophen (PERCOCET) 5-325 mg per tablet 1 Tab  1 Tab Oral Q4H PRN  
 latanoprost (XALATAN) 0.005 % ophthalmic solution 1 Drop  1 Drop Both Eyes QPM  
 insulin lispro (HUMALOG) injection   SubCUTAneous AC&HS  
 glucose chewable tablet 16 g  4 Tab Oral PRN  
 glucagon (GLUCAGEN) injection 1 mg  1 mg IntraMUSCular PRN  
 dextrose 10% infusion 125-250 mL  125-250 mL IntraVENous PRN  
 sodium chloride (NS) flush 5-10 mL  5-10 mL IntraVENous PRN  
 [Held by provider] apixaban (ELIQUIS) tablet 2.5 mg  2.5 mg Oral BID  atenoloL (TENORMIN) tablet 50 mg  50 mg Oral QPM  
 [Held by provider] potassium chloride (KLOR-CON) tablet 10 mEq  10 mEq Oral BID  sodium chloride (NS) flush 5-40 mL  5-40 mL IntraVENous Q8H  
 sodium chloride (NS) flush 5-40 mL  5-40 mL IntraVENous PRN  
 acetaminophen (TYLENOL) tablet 650 mg  650 mg Oral Q6H PRN Or  
 acetaminophen (TYLENOL) suppository 650 mg  650 mg Rectal Q6H PRN  polyethylene glycol (MIRALAX) packet 17 g  17 g Oral DAILY PRN  promethazine (PHENERGAN) tablet 12.5 mg  12.5 mg Oral Q6H PRN Or  
 ondansetron (ZOFRAN) injection 4 mg  4 mg IntraVENous Q6H PRN  Vancomycin - Pharmacy to Dose  1 Each Other Rx Dosing/Monitoring Allergies and Intolerances: Allergies Allergen Reactions  Ambien [Zolpidem] Other (comments) Hallucinations Family History:  
Family History Problem Relation Age of Onset  Cancer Mother  Heart Disease Father  Cancer Sister  Hypertension Sister Social History: He  reports that he has never smoked. He has never used smokeless tobacco.  He  reports no history of alcohol use. Review of Systems:  
 
Review of Systems Gen: No fever, chills, malaise, weight loss/gain. Heent: No headache, rhinorrhea, epistaxis, ear pain, hearing loss, sinus pain, neck pain/stiffness, sore throat. Heart: No chest pain, palpitations, SHAW, pnd, or orthopnea. Resp: No cough, hemoptysis, wheezing and shortness of breath. GI: No nausea, vomiting, diarrhea, constipation, melena or hematochezia. : No urinary obstruction, dysuria or hematuria. Derm: No rash, new skin lesion or pruritis. Musc/skeletal: no bone or joint complains. Right foot dressing Vasc: + edema, cyanosis or claudication. Endo: No heat/cold intolerance, no polyuria,polydipsia or polyphagia. Neuro: No unilateral weakness, numbness, tingling. No seizures. Heme: No easy bruising or bleeding. Physical:  
Patient Vitals for the past 6 hrs: 
 Temp Pulse Resp BP SpO2  
01/18/21 1934 98.2 °F (36.8 °C) 98 14 (!) 159/132 98 % 01/18/21 1843 98.1 °F (36.7 °C) 90 14 (!) 179/101 100 % 01/18/21 1753 98.1 °F (36.7 °C) 94 14 109/66 95 % 01/18/21 1721 97.8 °F (36.6 °C) 91 16 (!) 198/107 100 % 01/18/21 1540 98 °F (36.7 °C) 89 16 (!) 175/92 100 % Exam:  
General Appearance: Comfortable, not using accessory muscles of respiration. HEENT: ABELINO. HEAD: Atraumatic NECK: No JVD, no thyroidomeglay. LUNGS: Clear bilaterally. HEART: S1+S2 irregularly irregular ABD: Non-tender, BS Audible EXT: + edema, and no cysnosis. PSYCHIATRIC EXAM: Mood is appropriate. MUSCULOSKELETAL: Grossly no joint deformity. NEUROLOGICAL: Motor and sensory sytem intact and Cranial nerves II-XII intact. Review of Data:  
LABS:  
Lab Results Component Value Date/Time WBC 15.0 (H) 01/16/2021 01:05 AM  
 HGB 7.1 (L) 01/17/2021 02:30 PM  
 HCT 22.5 (L) 01/17/2021 02:30 PM  
 PLATELET 029 96/50/6934 01:05 AM  
 
Lab Results Component Value Date/Time  Sodium 144 01/18/2021 01:32 AM  
 Potassium 3.4 (L) 01/18/2021 01:32 AM  
 Chloride 111 01/18/2021 01:32 AM  
 CO2 26 01/18/2021 01:32 AM  
 Glucose 97 01/18/2021 01:32 AM  
 BUN 61 (H) 01/18/2021 01:32 AM  
 Creatinine 2.23 (H) 01/18/2021 01:32 AM  
 
No results found for: CHOL, CHOLX, CHLST, CHOLV, HDL, HDLP, LDL, LDLC, DLDLP, TGLX, TRIGL, TRIGP 
 No components found for: GPT Lab Results Component Value Date/Time Hemoglobin A1c 6.0 (H) 01/14/2021 01:30 AM  
 
 
RADIOLOGY: 
CT Results  (Last 48 hours) 01/18/21 1439  CT HEAD WO CONT Final result Impression:  IMPRESSION:  
   
No acute intracranial abnormalities. Stable chronic findings as above. *    ** * Narrative:  EXAM: CT HEAD WO CONT  
   
CLINICAL INDICATION/HISTORY: confusion on blood thinner COMPARISON: January 13, 2021 TECHNIQUE: Axial CT imaging of the head was performed without intravenous  
contrast. Sagittal and coronal reconstructions are provided. One or more dose  
reduction techniques were used on this CT: automated exposure control,  
adjustment of the mAs and/or kVp according to patient size, and iterative  
reconstruction techniques. The specific techniques used on this CT exam have  
been documented in the patient's electronic medical record. Digital Imaging and  
Communications in Medicine (DICOM) format image data are available to  
nonaffiliated external healthcare facilities or entities on a secure, media  
free, reciprocally searchable basis with patient authorization for at least a  
12-month period after this study  
   
   
_______________ FINDINGS:  
   
BRAIN AND POSTERIOR FOSSA: Generalized age-related cortical atrophy. There is no  
intracranial hemorrhage, mass effect, or midline shift. There are scattered and  
confluent foci of decreased attenuation in the periventricular white matter  
which are nonspecific but most likely sequelae of chronic microvascular disease. EXTRA-AXIAL SPACES AND MENINGES: There are no abnormal extra-axial fluid  
collections. CALVARIUM: Intact. SINUSES: Left sphenoid sinuses under aerated and opacified. OTHER: None.  
   
_______________ CXR Results  (Last 48 hours) None Cardiology Procedures: Results for orders placed or performed during the hospital encounter of 01/13/21 EKG, 12 LEAD, INITIAL Result Value Ref Range Ventricular Rate 103 BPM  
 Atrial Rate 94 BPM  
 QRS Duration 120 ms  
 Q-T Interval 396 ms QTC Calculation (Bezet) 518 ms Calculated R Axis 125 degrees Diagnosis Atrial fibrillation with rapid ventricular response with premature  
ventricular or aberrantly conducted complexes Right bundle branch block Left posterior fascicular block Bifascicular block Cannot rule out Inferior infarct , age undetermined Abnormal ECG When compared with ECG of 08-JAN-2021 10:05, Left posterior fascicular block is now present Minimal criteria for Inferior infarct are now present ST now depressed in Inferior leads T wave inversion now evident in Inferior leads Confirmed by Duy Edgar MD. ([id number]) on 1/13/2021 8:11:26 PM 
  
  
Echo Results  (Last 48 hours) None Cardiolite (Tc-99m Sestamibi) stress test 
 
 
 
Impression / Plan:   
Patient Active Problem List  
Diagnosis Code  Cellulitis and abscess of leg, except foot L03.119, L02.419  
 HTN (hypertension) I10  
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 [address] E11.621, L97.519  Stage 3 chronic kidney disease N18.30 Assessment and plan Preoperative cardiac clearance Chronic atrial fibrillation Hypertension Plan. At this point no evidence of ACS or unstable CHF, Atrial fibrillation is rate controlled EF is low normal 
 On the basis of patient history, physical exam and risk factors,  patient have mildly elevated but acceptable risk for right foot surgery. Continue with current dose of beta-blocker with atenolol No further cardiac testing is recommended at this point. Resume anticoagulation once stable from surgical standpoint Discussed with patient. Signed By: Milka Granados MD   
 January 18, 2021

## 2021-01-19 NOTE — PROGRESS NOTES
1915 
Assumed care of pt  
2034 Shift assessment complete Pt resting quietly with eyes open and chest rising and falling evenly [facility] Reassessment complete Pt resting quietly with eyes open and chest rising and falling quietly [id number] [facility] Reassessment complete Pt resting quietly with eyes open and chest rising and falling quietly 3 [facility] Night Shift Chart Audit Chart Audit completed? YES Bedside and Verbal shift change report given to Lyly Mo (oncoming nurse) by Andrea Bullard RN (offgoing nurse). Report included the following information SBAR, Kardex, ED Summary, Intake/Output, MAR, Recent Results, Med Rec Status and Cardiac Rhythm A fib .

## 2021-01-19 NOTE — PROGRESS NOTES
Problem: Falls - Risk of 
Goal: *Absence of Falls Description: Document Rosen Reason Fall Risk and appropriate interventions in the flowsheet. Outcome: Progressing Towards Goal 
Note: Fall Risk Interventions: 
Mobility Interventions: Assess mobility with egress test, Bed/chair exit alarm, Communicate number of staff needed for ambulation/transfer, Patient to call before getting OOB, Utilize walker, cane, or other assistive device Mentation Interventions: Adequate sleep, hydration, pain control, Bed/chair exit alarm Medication Interventions: Patient to call before getting OOB, Teach patient to arise slowly Elimination Interventions: Bed/chair exit alarm, Call light in reach, Patient to call for help with toileting needs History of Falls Interventions: Bed/chair exit alarm, Door open when patient unattended, Vital signs minimum Q4HRs X 24 hrs (comment for end date) Problem: Patient Education: Go to Patient Education Activity Goal: Patient/Family Education Outcome: Progressing Towards Goal 
  
Problem: Pressure Injury - Risk of 
Goal: *Prevention of pressure injury Description: Document Po Scale and appropriate interventions in the flowsheet. Outcome: Progressing Towards Goal 
Note: Pressure Injury Interventions: 
Sensory Interventions: Assess changes in LOC, Discuss PT/OT consult with provider, Float heels, Keep linens dry and wrinkle-free, Maintain/enhance activity level, Minimize linen layers, Monitor skin under medical devices, Pad between skin to skin, Pressure redistribution bed/mattress (bed type) Moisture Interventions: Absorbent underpads, Check for incontinence Q2 hours and as needed, Maintain skin hydration (lotion/cream), Minimize layers, Limit adult briefs Activity Interventions: Pressure redistribution bed/mattress(bed type), Increase time out of bed, PT/OT evaluation Mobility Interventions: Assess need for specialty bed, HOB 30 degrees or less, Pressure redistribution bed/mattress (bed type), PT/OT evaluation Nutrition Interventions: Document food/fluid/supplement intake Friction and Shear Interventions: HOB 30 degrees or less, Lift sheet, Lift team/patient mobility team, Minimize layers Problem: Patient Education: Go to Patient Education Activity Goal: Patient/Family Education Outcome: Progressing Towards Goal 
  
Problem: Patient Education: Go to Patient Education Activity Goal: Patient/Family Education Outcome: Progressing Towards Goal 
  
Problem: Cellulitis Care Plan (Adult) Goal: *Control of acute pain Outcome: Progressing Towards Goal 
Goal: *Skin integrity maintained Outcome: Progressing Towards Goal 
Goal: *Absence of infection signs and symptoms Outcome: Progressing Towards Goal 
  
Problem: Patient Education: Go to Patient Education Activity Goal: Patient/Family Education Outcome: Progressing Towards Goal 
  
Problem: Hypertension Goal: *Blood pressure within specified parameters Outcome: Progressing Towards Goal 
Goal: *Fluid volume balance Outcome: Progressing Towards Goal 
Goal: *Labs within defined limits Outcome: Progressing Towards Goal 
  
Problem: Patient Education: Go to Patient Education Activity Goal: Patient/Family Education Outcome: Progressing Towards Goal 
  
Problem: Surgical Pathway Day of Surgery Goal: Nutrition/Diet Outcome: Progressing Towards Goal 
Goal: Medications Outcome: Progressing Towards Goal 
Goal: Respiratory Outcome: Progressing Towards Goal 
  
Problem: Risk for Spread of Infection Goal: Prevent transmission of infectious organism to others Description: Prevent the transmission of infectious organisms to other patients, staff members, and visitors. Outcome: Progressing Towards Goal 
  
Problem: Patient Education:  Go to Education Activity Goal: Patient/Family Education Outcome: Progressing Towards Goal

## 2021-01-19 NOTE — PROGRESS NOTES
Progress Note Patient: Jeff Muhammad. Sex: male          DOA: 1/13/2021 YOB: 1940      Age:  79 y.o.        LOS:  LOS: 6 days Subjective:  
Patient seen today for follow up. Medications:  
 
Current Facility-Administered Medications:  
  [START ON 1/20/2021] Vancomycin Trough due 1/20/21 at 06:00 (~ 1 hour prior to 07:00 dose), 1 Each, Other, ONCE, Brooks Albert MD 
  vancomycin (VANCOCIN) 1,000 mg in 0.9% sodium chloride 250 mL (VIAL-MATE), 1,000 mg, IntraVENous, Q24H, Brooks Albert MD, 1,000 mg at 01/19/21 0707 
  0.9% sodium chloride infusion 250 mL, 250 mL, IntraVENous, PRN, Deniz Dyer MD 
  hydrALAZINE (APRESOLINE) tablet 25 mg, 25 mg, Oral, Q8H PRN, Deniz Dyer MD, 25 mg at 01/18/21 2312   amLODIPine (NORVASC) tablet 5 mg, 5 mg, Oral, DAILY, Deniz Dyer MD, 5 mg at 01/19/21 0847 
  heparin (porcine) 25,000 units in 0.45% saline 250 ml infusion, 18-36 Units/kg/hr, IntraVENous, TITRATE, Deniz Dyer MD, Last Rate: 15 mL/hr at 01/19/21 0939, 15 Units/kg/hr at 01/19/21 7949   piperacillin-tazobactam (ZOSYN) 2.25 g in 0.9% sodium chloride (MBP/ADV) 50 mL MBP, 2.25 g, IntraVENous, Q6H, Brooks Albert MD, Last Rate: 100 mL/hr at 01/19/21 1252, 2.25 g at 01/19/21 1252   [Held by provider] furosemide (LASIX) tablet 20 mg, 20 mg, Oral, DAILY, Deniz Dyer MD, 20 mg at 01/16/21 3690   oxyCODONE-acetaminophen (PERCOCET) 5-325 mg per tablet 1 Tab, 1 Tab, Oral, Q4H PRN, Carey Kessler MD 
  latanoprost (XALATAN) 0.005 % ophthalmic solution 1 Drop, 1 Drop, Both Eyes, QPM, Deniz Dyer MD, 1 Drop at 01/17/21 1753   insulin lispro (HUMALOG) injection, , SubCUTAneous, AC&HS, Deniz Dyer MD, 2 Units at 01/19/21 1253   glucose chewable tablet 16 g, 4 Tab, Oral, PRN, Deniz Dyer MD 
  glucagon [facility]) injection 1 mg, 1 mg, IntraMUSCular, PRN, Deniz Dyer MD 
  dextrose 10% infusion 125-250 mL, 125-250 mL, IntraVENous, PRN, Deniz Dyer MD 
   sodium chloride (NS) flush 5-10 mL, 5-10 mL, IntraVENous, PRN, Min Cornejo, DO 
  [Held by provider] apixaban Yaritza Boop) tablet 2.5 mg, 2.5 mg, Oral, BID, Elver Albert MD, 2.5 mg at 01/17/21 2110   atenoloL (TENORMIN) tablet 50 mg, 50 mg, Oral, QPM, Elver Albert MD, 50 mg at 01/18/21 1139   [Held by provider] potassium chloride (KLOR-CON) tablet 10 mEq, 10 mEq, Oral, BID, Elver Albert MD, 10 mEq at 01/16/21 7798   sodium chloride (NS) flush 5-40 mL, 5-40 mL, IntraVENous, Q8H, Elver Albert MD, 10 mL at 01/19/21 0848   sodium chloride (NS) flush 5-40 mL, 5-40 mL, IntraVENous, PRN, Elver Albert MD 
  acetaminophen (TYLENOL) tablet 650 mg, 650 mg, Oral, Q6H PRN, 650 mg at 01/16/21 1529 **OR** acetaminophen (TYLENOL) suppository 650 mg, 650 mg, Rectal, Q6H PRN, Elver Albert MD 
  polyethylene glycol (MIRALAX) packet 17 g, 17 g, Oral, DAILY PRN, Elver Albert MD 
  promethazine (PHENERGAN) tablet 12.5 mg, 12.5 mg, Oral, Q6H PRN **OR** ondansetron (ZOFRAN) injection 4 mg, 4 mg, IntraVENous, Q6H PRN, Elver Albert MD 
  Vancomycin - Pharmacy to Dose, 1 Each, Other, Rx Dosing/Monitoring, Elver Albert MD 
 
Review of Systems Negative for chest pain and shortness of breath Objective:  
  
Visit Vitals BP (!) 151/80 Pulse 77 Temp 97.9 °F (36.6 °C) Resp 17 Ht 6' (1.829 m) Wt 99.7 kg (219 lb 11.2 oz) SpO2 96% BMI 29.80 kg/m² Physical Exam: 
Foot dressing intact Labs: 
Recent Results (from the past 24 hour(s)) CBC WITH AUTOMATED DIFF Collection Time: 01/18/21  9:24 PM  
Result Value Ref Range WBC 15.4 (H) 4.6 - 13.2 K/uL  
 RBC 3.02 (L) 4.70 - 5.50 M/uL HGB 8.7 (L) 13.0 - 16.0 g/dL HCT 28.1 (L) 36.0 - 48.0 % MCV 93.0 74.0 - 97.0 FL  
 MCH 28.8 24.0 - 34.0 PG  
 MCHC 31.0 31.0 - 37.0 g/dL  
 RDW 18.1 (H) 11.6 - 14.5 % PLATELET 323 890 - 088 K/uL MPV 7.6 (L) 9.2 - 11.8 FL  
 NEUTROPHILS 82 (H) 40 - 73 % LYMPHOCYTES 9 (L) 21 - 52 % MONOCYTES 8 3 - 10 % EOSINOPHILS 1 0 - 5 % BASOPHILS 0 0 - 2 %  
 ABS. NEUTROPHILS 12.6 (H) 1.8 - 8.0 K/UL  
 ABS. LYMPHOCYTES 1.4 0.9 - 3.6 K/UL  
 ABS. MONOCYTES 1.2 0.05 - 1.2 K/UL  
 ABS. EOSINOPHILS 0.2 0.0 - 0.4 K/UL  
 ABS. BASOPHILS 0.0 0.0 - 0.1 K/UL  
 RBC COMMENTS ANISOCYTOSIS 2+ 
    
 RBC COMMENTS POLYCHROMASIA 2+ 
    
 RBC COMMENTS OVALOCYTES 1+ 
    
 DF AUTOMATED    
PTT Collection Time: 01/18/21  9:24 PM  
Result Value Ref Range aPTT 42.6 (H) 23.0 - 36.4 SEC GLUCOSE, POC Collection Time: 01/18/21  9:40 PM  
Result Value Ref Range Glucose (POC) 144 (H) 70 - 110 mg/dL MAGNESIUM Collection Time: 01/19/21  3:50 AM  
Result Value Ref Range Magnesium 2.2 1.6 - 2.6 mg/dL METABOLIC PANEL, BASIC Collection Time: 01/19/21  3:50 AM  
Result Value Ref Range Sodium 145 136 - 145 mmol/L Potassium 3.4 (L) 3.5 - 5.5 mmol/L Chloride 111 100 - 111 mmol/L  
 CO2 24 21 - 32 mmol/L Anion gap 10 3.0 - 18 mmol/L Glucose 125 (H) 74 - 99 mg/dL BUN 61 (H) 7.0 - 18 MG/DL Creatinine 2.20 (H) 0.6 - 1.3 MG/DL  
 BUN/Creatinine ratio 28 (H) 12 - 20 GFR est AA 35 (L) >60 ml/min/1.73m2 GFR est non-AA 29 (L) >60 ml/min/1.73m2 Calcium 8.0 (L) 8.5 - 10.1 MG/DL  
CBC WITH AUTOMATED DIFF Collection Time: 01/19/21  3:50 AM  
Result Value Ref Range WBC 16.0 (H) 4.6 - 13.2 K/uL  
 RBC 3.04 (L) 4.70 - 5.50 M/uL HGB 8.7 (L) 13.0 - 16.0 g/dL HCT 28.0 (L) 36.0 - 48.0 % MCV 92.1 74.0 - 97.0 FL  
 MCH 28.6 24.0 - 34.0 PG  
 MCHC 31.1 31.0 - 37.0 g/dL  
 RDW 18.5 (H) 11.6 - 14.5 % PLATELET 632 850 - 821 K/uL MPV 7.8 (L) 9.2 - 11.8 FL  
 NEUTROPHILS 79 (H) 40 - 73 % LYMPHOCYTES 11 (L) 21 - 52 % MONOCYTES 9 3 - 10 % EOSINOPHILS 1 0 - 5 % BASOPHILS 0 0 - 2 %  
 ABS. NEUTROPHILS 12.6 (H) 1.8 - 8.0 K/UL  
 ABS. LYMPHOCYTES 1.8 0.9 - 3.6 K/UL ABS. MONOCYTES 1.4 (H) 0.05 - 1.2 K/UL  
 ABS. EOSINOPHILS 0.2 0.0 - 0.4 K/UL  
 ABS. BASOPHILS 0.0 0.0 - 0.1 K/UL  
 DF AUTOMATED    
PTT Collection Time: 01/19/21  3:50 AM  
Result Value Ref Range aPTT 89.7 (H) 23.0 - 36.4 SEC GLUCOSE, POC Collection Time: 01/19/21  6:02 AM  
Result Value Ref Range Glucose (POC) 131 (H) 70 - 110 mg/dL PTT Collection Time: 01/19/21  7:44 AM  
Result Value Ref Range aPTT >180.0 (HH) 23.0 - 36.4 SEC GLUCOSE, POC Collection Time: 01/19/21 11:02 AM  
Result Value Ref Range Glucose (POC) 178 (H) 70 - 110 mg/dL GLUCOSE, POC Collection Time: 01/19/21  3:47 PM  
Result Value Ref Range Glucose (POC) 121 (H) 70 - 110 mg/dL Assessment/Plan Principal Problem: 
  Cellulitis and abscess of leg, except foot (1/13/2013) Active Problems: 
  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 Recommended TMA with delayed flap closure tomorrow. DW patient at length. He is slightly confused as to time but can accurately understand the described procedure.

## 2021-01-19 NOTE — PROGRESS NOTES
5970 Assumed care of the patient from [address] (offgoing Nurse). Patient is alert and orientedx1. Pt denies any pain or discomfort at this moment. bed in low position, call bell within reach. 1920 Bedside and Verbal shift change report given to [facility] JAMILA  RN(oncoming nurse) by Mulu Crowley RN (offgoing nurse). Report included the following information SBAR, Kardex, Intake/Output, MAR and Cardiac Rhythm .

## 2021-01-19 NOTE — PROGRESS NOTES
Cardiology Progress Note Patient: Rosana Estrada. Sex: male          DOA: 1/13/2021 YOB: 1940      Age:  79 y.o.        LOS:  LOS: 6 days Assessment/Plan Principal Problem: 
  Cellulitis and abscess of leg, except foot (1/13/2013) Active Problems: 
  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 
Denied any cardiac symptoms. CHF A. Fib AFib rate controlled Resume Lasix once renal function is stable Continue with amlodipine and atenolol Anticoagulation after foot surgery when stable Discussed with the patient. Subjective:  
 cc: CHF A. Fib Preop clearance REVIEW OF SYSTEMS:  
 
General: No fevers or chills. Cardiovascular: No chest pain or pressure. No palpitations. No ankle swelling Pulmonary: No SOB, orthopnea, PND Gastrointestinal: No nausea, vomiting or diarrhea Objective:  
  
Visit Vitals BP (!) 151/80 Pulse 77 Temp 97.9 °F (36.6 °C) Resp 17 Ht 6' (1.829 m) Wt 99.7 kg (219 lb 11.2 oz) SpO2 96% BMI 29.80 kg/m² Body mass index is 29.8 kg/m². Physical Exam: 
General Appearance: Comfortable, not using accessory muscles of respiration. NECK: No JVD, no thyroidomeglay. LUNGS: Clear bilaterally. HEART: S1 irregular, variable +S2 audible, ABD: Non-tender, BS Audible PSYCHIATRIC EXAM: Mood is appropriate. Medication: 
Current Facility-Administered Medications Medication Dose Route Frequency  [START ON 1/20/2021] Vancomycin Trough due 1/20/21 at 06:00 (~ 1 hour prior to 07:00 dose)  1 Each Other ONCE  
  vancomycin (VANCOCIN) 1,000 mg in 0.9% sodium chloride 250 mL (VIAL-MATE)  1,000 mg IntraVENous Q24H  
 0.9% sodium chloride infusion 250 mL  250 mL IntraVENous PRN  
 hydrALAZINE (APRESOLINE) tablet 25 mg  25 mg Oral Q8H PRN  
 amLODIPine (NORVASC) tablet 5 mg  5 mg Oral DAILY  heparin (porcine) 25,000 units in 0.45% saline 250 ml infusion  18-36 Units/kg/hr IntraVENous TITRATE  piperacillin-tazobactam (ZOSYN) 2.25 g in 0.9% sodium chloride (MBP/ADV) 50 mL MBP  2.25 g IntraVENous Q6H  
 [Held by provider] furosemide (LASIX) tablet 20 mg  20 mg Oral DAILY  oxyCODONE-acetaminophen (PERCOCET) 5-325 mg per tablet 1 Tab  1 Tab Oral Q4H PRN  
 latanoprost (XALATAN) 0.005 % ophthalmic solution 1 Drop  1 Drop Both Eyes QPM  
 insulin lispro (HUMALOG) injection   SubCUTAneous AC&HS  
 glucose chewable tablet 16 g  4 Tab Oral PRN  
 glucagon (GLUCAGEN) injection 1 mg  1 mg IntraMUSCular PRN  
 dextrose 10% infusion 125-250 mL  125-250 mL IntraVENous PRN  
 sodium chloride (NS) flush 5-10 mL  5-10 mL IntraVENous PRN  
 [Held by provider] apixaban (ELIQUIS) tablet 2.5 mg  2.5 mg Oral BID  atenoloL (TENORMIN) tablet 50 mg  50 mg Oral QPM  
 [Held by provider] potassium chloride (KLOR-CON) tablet 10 mEq  10 mEq Oral BID  sodium chloride (NS) flush 5-40 mL  5-40 mL IntraVENous Q8H  
 sodium chloride (NS) flush 5-40 mL  5-40 mL IntraVENous PRN  
 acetaminophen (TYLENOL) tablet 650 mg  650 mg Oral Q6H PRN Or  
 acetaminophen (TYLENOL) suppository 650 mg  650 mg Rectal Q6H PRN  polyethylene glycol (MIRALAX) packet 17 g  17 g Oral DAILY PRN  promethazine (PHENERGAN) tablet 12.5 mg  12.5 mg Oral Q6H PRN Or  
 ondansetron (ZOFRAN) injection 4 mg  4 mg IntraVENous Q6H PRN  Vancomycin - Pharmacy to Dose  1 Each Other Rx Dosing/Monitoring Lab/Data Reviewed: 
Procedures/imaging: see electronic medical records for all procedures/Xrays and details which were not copied into this note but were reviewed prior to creation of Plan All lab results for the last 24 hours reviewed. Recent Labs  
  01/19/21 
0350 01/18/21 
2124 01/17/21 
1430 WBC 16.0* 15.4*  --   
HGB 8.7* 8.7* 7.1*  
HCT 28.0* 28.1* 22.5*  
 318  --   
 
Recent Labs  
  01/19/21 
0350 01/18/21 
0132 01/17/21 
0615  144 143  
K 3.4* 3.4* 3.0*  
 111 108 CO2 24 26 25 * 97 99 BUN 61* 61* 61* CREA 2.20* 2.23* 2.38* CA 8.0* 8.2* 8.2*  
 
 
RADIOLOGY: 
CT Results  (Last 48 hours) 01/18/21 1439  CT HEAD WO CONT Final result Impression:  IMPRESSION:  
   
No acute intracranial abnormalities. Stable chronic findings as above. *    ** * Narrative:  EXAM: CT HEAD WO CONT  
   
CLINICAL INDICATION/HISTORY: confusion on blood thinner COMPARISON: January 13, 2021 TECHNIQUE: Axial CT imaging of the head was performed without intravenous  
contrast. Sagittal and coronal reconstructions are provided. One or more dose  
reduction techniques were used on this CT: automated exposure control,  
adjustment of the mAs and/or kVp according to patient size, and iterative  
reconstruction techniques. The specific techniques used on this CT exam have  
been documented in the patient's electronic medical record. Digital Imaging and  
Communications in Medicine (DICOM) format image data are available to  
nonaffiliated external healthcare facilities or entities on a secure, media  
free, reciprocally searchable basis with patient authorization for at least a  
12-month period after this study  
   
   
_______________ FINDINGS:  
   
BRAIN AND POSTERIOR FOSSA: Generalized age-related cortical atrophy. There is no  
intracranial hemorrhage, mass effect, or midline shift. There are scattered and  
confluent foci of decreased attenuation in the periventricular white matter which are nonspecific but most likely sequelae of chronic microvascular disease. EXTRA-AXIAL SPACES AND MENINGES: There are no abnormal extra-axial fluid  
collections. CALVARIUM: Intact. SINUSES: Left sphenoid sinuses under aerated and opacified. OTHER: None.  
   
_______________ CXR Results  (Last 48 hours) None Cardiology Procedures:  
Results for orders placed or performed during the hospital encounter of 01/13/21 EKG, 12 LEAD, INITIAL Result Value Ref Range Ventricular Rate 103 BPM  
 Atrial Rate 94 BPM  
 QRS Duration 120 ms  
 Q-T Interval 396 ms QTC Calculation (Bezet) 518 ms Calculated R Axis 125 degrees Diagnosis Atrial fibrillation with rapid ventricular response with premature  
ventricular or aberrantly conducted complexes Right bundle branch block Left posterior fascicular block Bifascicular block Cannot rule out Inferior infarct , age undetermined Abnormal ECG When compared with ECG of 08-JAN-2021 10:05, Left posterior fascicular block is now present Minimal criteria for Inferior infarct are now present ST now depressed in Inferior leads T wave inversion now evident in Inferior leads Confirmed by Yulia Redman MD. ([id number]) on 1/13/2021 8:11:26 PM 
  
  
Echo Results  (Last 48 hours) None Cardiolite (Tc-99m Sestamibi) stress test 
 
Signed By: Evangelina London MD   
 January 19, 2021

## 2021-01-19 NOTE — PROGRESS NOTES
Problem: Mobility Impaired (Adult and Pediatric) Goal: *Acute Goals and Plan of Care (Insert Text) Description: Physical Therapy Goals Initiated 1/14/2021 and to be accomplished within 7 day(s) 1. Patient will move from supine <> sit with SBA in prep for out of bed activity and change of position. 2.  Patient will perform sit<> stand with CGA/min A/RW in prep for transfers/ambulation. 3.  Patient will transfer from bed <> chair with CGA/min A/RW for time up in chair for completion of ADL activity. 4.  Patient will ambulate 50 feet with CGA/MIN A/RW for improved functional mobility at discharge. 5.  Patient will ascend/descend 3-5 stairs with handrail(s) with minimal assistance/contact guard assist for home re-entry as needed. Outcome: Progressing Towards Goal 
 
PHYSICAL THERAPY TREATMENT Patient: Gadiel Alejandra. (79 y.o. male) Date: 1/19/2021 Diagnosis: Acute CHF (congestive heart failure) ([facility] [address] [I50.9] Lymphedema [I89.0] Cellulitis and abscess of leg, except foot Procedure(s) (LRB): 
RIGHT FOOT IRRIGATION AND DEBRIDEMENT, REMOVAL OF SECOND METATARSAL (Right) 4 Days Post-Op Precautions: Fall ASSESSMENT: 
Pt alert but confused,disoriented, but cooperative. Requested to go to bathroom on PT arrival.  Educated pt in inability to ambulate due to weakness of LE's at this time and NWB'g R foot. Acknowledged understanding but continued to make request. Pt given urinal and able to void. Able to participate with therapeutic ex as noted below for strengthening UE's and LE's with vc/tc. Pt for surgery today as it was cancelled yesterday. Progression toward goals:  
[]      Improving appropriately and progressing toward goals [x]      Improving slowly and progressing toward goals 
[]      Not making progress toward goals and plan of care will be adjusted PLAN: 
 Patient continues to benefit from skilled intervention to address the above impairments. Continue treatment per established plan of care. Discharge Recommendations:  Anup Rogeluel Further Equipment Recommendations for Discharge:  N/A  
 
SUBJECTIVE:  
Patient stated I slept real good last night.  OBJECTIVE DATA SUMMARY:  
Critical Behavior: 
Neurologic State: Alert, Confused Orientation Level: Oriented to person, Disoriented to place, Disoriented to situation, Disoriented to time Cognition: Follows commands, Poor safety awareness Safety/Judgement: Lack of insight into deficits, Decreased awareness of environment, Decreased awareness of need for assistance Functional Mobility Training: 
Bed Mobility: 
Scooting: Maximum assistance; Total assistance Therapeutic Exercises:  
 
EXERCISE Sets Reps Active Active Assist  
Passive Self ROM Comments BUE Punchups 1 10  [x] [] [] []   
BUE Overhead Flex 1 10  [x] [] [] []   
BUE PNF 1 10 [x] [] [] [] Vc/tc QS/GS 2 3 [x] [] [] [] V/tc  
   [] [] [] []   
Pain: 
Pain level pre-treatment: 0/10 Pain level post-treatment: 0/10 Pain Intervention(s): Medication (see MAR); Rest, Ice, Repositioning Response to intervention: Nurse notified, See doc flow Activity Tolerance:  
Fair Please refer to the flowsheet for vital signs taken during this treatment. After treatment:  
[] Patient left in no apparent distress sitting up in chair 
[x] Patient left in no apparent distress in bed 
[x] Call bell left within reach 
[] Nursing notified 
[] Caregiver present [x] Bed alarm activated 
[] SCDs applied COMMUNICATION/EDUCATION:  
[x]         Role of Physical Therapy in the acute care setting. []         Fall prevention education was provided and the patient/caregiver indicated understanding. []         Patient/family have participated as able in working toward goals and plan of care. []         Patient/family agree to work toward stated goals and plan of care. []         Patient understands intent and goals of therapy, but is neutral about his/her participation. []         Patient is unable to participate in stated goals/plan of care: ongoing with therapy staff. 
[]         Other: 
 
   
Radha Miranda, PT Time Calculation: 35 mins

## 2021-01-19 NOTE — PROGRESS NOTES
Hospitalist Progress Note-critical care note Patient: Jeff Muhammad. MRN: [id number]  CSN: [id number] YOB: 1940  Age: 79 y.o. 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 
ICD-9-CM: 401.9  1/13/2013 Yes Pt was admitted for sob and suspected chf exacerbation, also leg cellilitis and possible toe OM Acute CHF (congestive heart failure) ([facility] (1/13/2021) No sob  echo with ef 40 %,  
Cardiologist on board Lymphedema (1/13/2021) No dvt elevated leg Improving Cellulitis Mild improving IV Zosyn/Vanc-Pharmacy dosing 
  
    
Diabetic Foot Ulcer with some discharge from amputated toes site Drain and debridement per Dr. Krysten Ellis, will have transmetatarsal amputation 20th  
Continue local wound care Wound cx mrsa   
 
  
PAF 
eliquis hold, heparin gtt , will restart eliquis after the procedure Rate controlled per atenolol Replace K  
  
laura- ckd3 Cr stable Continue to  hold lasix and losartan Continue monitoring cr  
 
 DM type II Continue ssi Hypokalemia  
k replacement Delirium resolved Subjective : I  Am ready for the surgery Rn: stable Disposition :tbd, Review of systems: 
 
General: No fevers or chills. Cardiovascular: No chest pain or pressure. No palpitations. Pulmonary: No shortness of breath. Gastrointestinal: No nausea, vomiting. Vital signs/Intake and Output: 
Visit Vitals /75 Pulse 85 Temp 97.7 °F (36.5 °C) Resp 16 Ht 6' (1.829 m) Wt 99.7 kg (219 lb 11.2 oz) SpO2 99% BMI 29.80 kg/m² Current Shift:  No intake/output data recorded. Last three shifts:  01/17 1901 - 01/19 0700 In: 287.5 [P.O.:50] Out: 400 [Urine:400] Physical Exam: 
General: WD, WN. Alert, cooperative, no acute distress HEENT: NC, Atraumatic. PERRLA, anicteric sclerae. Lungs: CTA Bilaterally. No Wheezing/Rhonchi/Rales. Heart:  Regular  rhythm,  No murmur, No Rubs, No Gallops Abdomen: Soft, Non distended, Non tender. +Bowel sounds, Extremities: No c/c. Skin lesion noted on bilateral legs ,improving, rt foot covered wrapped with ace bandage Psych:   Not anxious or agitated. Neurologic:  No acute neurological deficit. Labs: Results:  
   
Chemistry Recent Labs  
  01/19/21 
0350 01/18/21 
0132 01/17/21 
8100 * 97 99  144 143  
K 3.4* 3.4* 3.0*  
 111 108 CO2 24 26 25 BUN 61* 61* 61* CREA 2.20* 2.23* 2.38* CA 8.0* 8.2* 8.2* AGAP 10 7 10 BUCR 28* 27* 26* CBC w/Diff Recent Labs  
  01/19/21 
0350 01/18/21 
2124 01/17/21 
1430 WBC 16.0* 15.4*  --   
RBC 3.04* 3.02*  --   
HGB 8.7* 8.7* 7.1*  
HCT 28.0* 28.1* 22.5*  
 318  --   
GRANS 79* 82*  --   
LYMPH 11* 9*  --   
EOS 1 1  --   
  
Cardiac Enzymes No results for input(s): CPK, CKND1, DEQUAN in the last 72 hours. No lab exists for component: Emile Edwards Coagulation Recent Labs  
  01/19/21 
0744 01/19/21 
0350 APTT >180.0* 89.7*  
   
 Lipid Panel No results found for: CHOL, CHOLPOCT, CHOLX, CHLST, CHOLV, 612882, HDL, HDLP, LDL, LDLC, DLDLP, 910747, VLDLC, VLDL, TGLX, TRIGL, TRIGP, TGLPOCT, CHHD, CHHDX  
BNP No results for input(s): BNPP in the last 72 hours. Liver Enzymes No results for input(s): TP, ALB, TBIL, AP in the last 72 hours. No lab exists for component: SGOT, GPT, DBIL Thyroid Studies No results found for: T4, T3U, TSH, TSHEXT, TSHEXT Procedures/imaging: see electronic medical records for all procedures/Xrays and details which were not copied into this note but were reviewed prior to creation of Plan Mri Foot Rt Wo Cont Result Date: 1/14/2021 Procedure: MRI of the right forefoot without contrast. HISTORY: -From Provider: diabetic ulcer -Additional: Ulcer distal plantar right foot Comparisons: None Technique: Sagittal and coronal T1 and STIR; axial T1 and T2 with fat saturation sequences of the forefoot were obtained. Intravenous gadolinium was not requested or obtained. The study is motion degraded even with repeat attempt, which does limit sensitivity. Subtle diagnostic for clinical question. Findings: 2nd digit: There is heterogeneous soft tissue thickening surrounding the distal aspect of the head of the 2nd metatarsal, with surrounding foci of low signal intensity, suspicious for gas, largest dorsal lateral to the 2nd metatarsal head. There is adjacent heterogeneously T1 hypointense, T2/STIR hyperintense thickening in the subcutaneous fat, extending to the skin margin of the 2nd digit, at the site of suspected dressing. This may correspond to the site of the ulcer, but should be clinically correlated. There is diffuse fatty change in the intrinsic foot musculature, with focally heterogeneously hyperintense STIR signal in the soft tissues surrounding the 2nd metatarsal diaphysis. Indistinct cortical margin of the 2nd metatarsal head, subjacent patchy T1 hypointense STIR and T2 hyperintense marrow signal in the head and distal diaphysis of the 2nd metatarsal. 1st digit: Amputation of the phalanges distal to the 1st metatarsal head. Cartilage loss and subchondral marrow hyperintensity in the medial cuneiform subjacent to the 1st tarsal metatarsal joint and mild spurring at the 1st metatarsal-sesamoid joint. The 1st and 2nd phalanges are absent, suggesting prior amputation. There is thickening of the subcutaneous fat and skin at the plantar aspect of the 2nd and 3rd MTP joints. There is skin puckering at the plantar aspect of the foot between the 3rd and 4th MTP joints.  There is dorsiflexion at the MTP and plantarflexion at the DIP joints of the 3rd-5th digits. No acute associated marrow signal abnormalities are seen Impression: Images significant degraded by motion artifact. 1. Findings suspicious for osteomyelitis in the head and neck of the 2nd metatarsal, with surrounding cellulitis. Recommend correlation with clinical and laboratory findings. 2. Foci of low signal intensity in the soft tissues about the head of the 2nd metatarsal are suspicious for gas, and can be seen in the setting of gas forming infection or recent intervention. 3. Edema/myositis in the fatty replaced intrinsic foot musculature about the 2nd metatarsal 4. Plantar subcutaneous fat and skin thickening at the 1st and 2nd metatarsal heads, which can be related to cellulitis or chronic changes. Skin puckering at the plantar 3rd-4th metatarsal interspace is age indeterminate, and can reflect chronic or acute ulcerative etiology. 5. Presumed amputation of the 1st and 2nd phalanges distal to the metatarsal heads. Degenerative changes at the 1st tarsal metatarsal and 1st metatarsal sesamoid joint. Ct Head Wo Cont Result Date: 1/13/2021 EXAM: CT HEAD, WITHOUT IV CONTRAST INDICATION: Lethargy, headache, bilateral lower extremity swelling COMPARISON: None TECHNIQUE: Multiple axial CT images of the head were obtained extending from the skull base through the vertex. Additional coronal and sagittal reformations were also performed. One or more dose reduction techniques were used on this CT: automated exposure control, adjustment of the mAs and/or kVp according to patient size, and iterative reconstruction techniques. The specific techniques used on this CT exam have been documented in the patient's electronic medical record. Digital Imaging and Communications in Medicine (DICOM) format image data are available to nonaffiliated external healthcare facilities or entities on a secure, media free, reciprocally searchable basis with patient authorization for at least a 12-month period after this study. _______________ FINDINGS: BRAIN AND POSTERIOR FOSSA: There are a few small, probably chronic right and left basal ganglia lacunar infarcts. There is generalized prominence of the ventricular system, associated with proportional widening of the cortical cerebral sulci, compatible with generalized volume loss. Hazy hypoattenuation identified along the periventricular white matter, a nonspecific finding which is most commonly encountered in the setting of chronic microvascular disease. There is no intracranial hemorrhage, mass effect, or midline shift. There are no significant additional areas of abnormal parenchymal attenuation. EXTRA-AXIAL SPACES AND MENINGES: There are no abnormal extra-axial fluid collections. CALVARIUM: No acute osseous abnormality. OTHER: The visualized portions of the paranasal sinuses and mastoid air cells are clear. _______________ IMPRESSION: 1. No CT evidence of an acute intracranial abnormality - in particular, no acute cortical infarct, hemorrhage, or mass effect. 2.  Mild cerebral atrophy/volume loss and periventricular white matter changes, most commonly seen with chronic microvascular disease. Xr Chest [facility] Result Date: 1/13/2021 EXAM: XR CHEST PORT CLINICAL INDICATION/HISTORY: gen weakness -Additional: None COMPARISON: 1/8/2021 TECHNIQUE: Portable frontal view of the chest _______________ FINDINGS: SUPPORT DEVICES: None. HEART AND MEDIASTINUM: Cardiomediastinal silhouette within normal limits. LUNGS AND PLEURAL SPACES: Hypoinflated lungs. No dense consolidation, large effusion or pneumothorax. _______________ IMPRESSION: No acute cardiopulmonary abnormality. Xr Chest [facility] Result Date: 1/8/2021 EXAM: XR CHEST PORT CLINICAL INDICATION/HISTORY: Chest pain -Additional: None COMPARISON: 11/17/2014 TECHNIQUE: Portable frontal view of the chest _______________ FINDINGS: SUPPORT DEVICES: None. HEART AND MEDIASTINUM: Cardiomegaly. LUNGS AND PLEURAL SPACES: No dense consolidation, large effusion or pneumothorax. _______________ IMPRESSION: Cardiomegaly. No acute radiographic abnormality. Duplex Lower Ext Venous Bilat Result Date: 1/13/2021 · No evidence of deep vein thrombosis in the right lower extremity veins assessed. · No evidence of deep vein thrombosis in the left lower extremity veins assessed. Patency of mid to distal femoral vein bilaterally is done by the color flow and doppler signal only Duplex Lower Ext Artery Bilat Result Date: 1/14/2021 No evidence of any significant peripheral arterial disease in the bilateral lower extremity. Triphasic waveforms noted throughout.  
 
 
Lawrence Tabor MD

## 2021-01-19 NOTE — PROGRESS NOTES
Physician Progress Note Clara Sahni 
CSN #:                  [id number] :                       1940 ADMIT DATE:       2021 9:56 AM 
[address] [place] DATE: 
RESPONDING 
PROVIDER #:        Kelsi Carl MD 
 
 
 
 
QUERY TEXT: 
 
Dear Hospitalist, Pt admitted with and has CHF documented. If possible, please document in progress notes and discharge summary further specificity regarding the type and acuity of CHF: 
 
The medical record reflects the following: 
 
Risk Factors: Advanced age, HTN, CKD 3, DM, LE edema, lymphedema Clinical Indicators: 
> Echo with EF 40 %, Low normal systolic function. Unable to assess diastolic function 
> ED provider documented- \"Chest x-ray showing no pulmonary edema however patient with significantly elevated BNP and with his lymphedema is consistent with acute CHF\" 
> Exacerbation of CHF: presented to the ED with c/o increased weakness and swelling in bilateral lower extremities. Treatment: receiving Lasix, Cardiology, Echocardiogram, HCTZ Thank you, Deanne Salmeron RN, BSN, [address] [place] [address] 
[phone] Options provided: 
-- Acute on Chronic Systolic CHF/HFrEF 
-- Acute on Chronic Diastolic CHF/HFpEF 
-- Acute on Chronic Systolic and Diastolic CHF 
-- Acute Systolic CHF/HFrEF 
-- Acute Diastolic CHF/HFpEF 
-- Acute Systolic and Diastolic CHF 
-- Chronic Systolic CHF/HFrEF 
-- Chronic Diastolic CHF/HFpEF 
-- Chronic Systolic and Diastolic CHF 
-- Other - I will add my own diagnosis -- Disagree - Not applicable / Not valid -- Disagree - Clinically unable to determine / Unknown 
-- Refer to Clinical Documentation Reviewer PROVIDER RESPONSE TEXT: 
 
This patient is in acute on chronic diastolic CHF/HFpEF.  
 
Query created by: Guanako Ren on 2021 3:51 PM 
 
 
Electronically signed by:  Kelsi Carl MD 2021 4:54 PM

## 2021-01-20 NOTE — PROGRESS NOTES
Hospitalist Progress Note Patient: Sander Garrison MRN: [id number]  CSN: [id number] YOB: 1940  Age: 79 y.o. Sex: male DOA: 1/13/2021 LOS:  LOS: 7 days Chief Complaint: 
 
Diabetic foot infection Assessment/Plan  
 
  
Pt was admitted for sob and suspected chf exacerbation, also leg and foot infection 
  
Acute CHF, systolic 
echo with ef 40 %,  
Cardiologist on board  
  
Lymphedema No DVT, on lasix 
  
Cellulitis of legs and foot 
improved IV Zosyn/Vanc-Pharmacy dosing MRSA on culture 
  
Diabetic Foot Ulcer with some discharge from amputated toes site For transmetatarsal amputation right foot today Continue local wound care PAF 
eliquis on hold, heparin gtt , will restart eliquis after surgery Rate controlled per atenolol Hypokalemia-3 runs IV Kcl, daily BMP 
  
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 
  
DM type II with renal disease Continue ssi Delirium resolved Further plans as we see how he does after surgery and whether he will need extended IV abx course Disposition : 
Patient Active Problem List  
Diagnosis Code  Cellulitis and abscess of leg, except foot L03.119, L02.419  
 HTN (hypertension) I10  
 Glaucoma H40.9  Unspecified sleep apnea G47.30  ARF (acute renal failure) ([facility]) N17.9  Chronic venous insufficiency I87.2  Cellulitis of leg, right L03.115  Lymphedema I89.0  Acute CHF (congestive heart failure) ([facility]) I50.9  Hypokalemia E87.6  Amputation of right great toe [facility]) V86.598G  Diabetic ulcer of right foot associated with type 2 diabetes mellitus ([facility] [address] E11.621, L97.519  Stage 3 chronic kidney disease N18.30 Subjective: 
 
I feel ok Just waiting to have surgery Did not eat this am 
Denies pain, SOB, , nausea Review of systems: 
 
Constitutional: denies fevers, chills Respiratory: denies SOB, cough Cardiovascular: denies chest pain Gastrointestinal: denies nausea, vomiting, diarrhea Vital signs/Intake and Output: 
Visit Vitals BP (!) 168/91 Pulse 81 Temp 97.7 °F (36.5 °C) Resp 16 Ht 6' (1.829 m) Wt 99.7 kg (219 lb 11.2 oz) SpO2 100% BMI 29.80 kg/m² Current Shift:  No intake/output data recorded. Last three shifts:  01/18 1901 - 01/20 0700 In: 626.8 [I.V.:389.3] Out: - Exam: 
 
General: elderly WM, aaox3, NAD 
CVS:Regular rate and rhythm, no M/R/G, S1/S2 heard, no thrill Lungs:Clear to auscultation bilaterally, no wheezes, rhonchi, or rales Abdomen: Soft, Nontender, No distention, Normal Bowel sounds, No hepatomegaly Extremities: lymphedema LE BL, redness improved, stasis edema LE BL, right foot dressed Neuro:grossly normal , follows commands Psych:appropriate Labs: Results:  
   
Chemistry Recent Labs  
  01/20/21 
0544 01/19/21 
0350 01/18/21 
0132 * 125* 97  145 144  
K 3.2* 3.4* 3.4*  
* 111 111 CO2 23 24 26 BUN 71* 61* 61* CREA 2.47* 2.20* 2.23* CA 8.8 8.0* 8.2* AGAP 9 10 7 BUCR 29* 28* 27* CBC w/Diff Recent Labs  
  01/20/21 
0544 01/19/21 
0350 01/18/21 
2124 WBC 15.7* 16.0* 15.4*  
RBC 3.03* 3.04* 3.02* HGB 9.0* 8.7* 8.7* HCT 28.2* 28.0* 28.1*  
 312 318 GRANS 75* 79* 82* LYMPH 11* 11* 9* EOS 5 1 1 Cardiac Enzymes No results for input(s): CPK, CKND1, DEQUAN in the last 72 hours. No lab exists for component: Georgean Del Coagulation Recent Labs  
  01/20/21 
0545 01/19/21 
2312 APTT 115.6* 101.2* Lipid Panel No results found for: CHOL, CHOLPOCT, CHOLX, CHLST, CHOLV, 608366, HDL, HDLP, LDL, LDLC, DLDLP, 601831, VLDLC, VLDL, TGLX, TRIGL, TRIGP, TGLPOCT, CHHD, CHHDX  
BNP No results for input(s): BNPP in the last 72 hours. Liver Enzymes No results for input(s): TP, ALB, TBIL, AP in the last 72 hours. No lab exists for component: SGOT, GPT, DBIL Thyroid Studies No results found for: T4, T3U, TSH, TSHEXT    
 Procedures/imaging: see electronic medical records for all procedures/Xrays and details which were not copied into this note but were reviewed prior to creation of Plan Lori Villa MD

## 2021-01-20 NOTE — PROGRESS NOTES
Care manager met with patient at bedside - patient is pending surgery today and will wait for PT recommendation for further discharge plans.

## 2021-01-20 NOTE — PROGRESS NOTES
Bedside and Verbal shift change report given to Tor Pacheco RN (oncoming nurse) by CLYDE Berry RN (offgoing nurse). Report included the following information SBAR, Kardex, Intake/Output, MAR and Recent Results.

## 2021-01-20 NOTE — PERIOP NOTES
TRANSFER - OUT REPORT: 
 
Verbal report given to Karina Avila RN (name) on Obey Hough.  being transferred to [facility](unit) for routine post - op Report consisted of patients Situation, Background, Assessment and  
Recommendations(SBAR). Information from the following report(s) SBAR, Kardex, OR Summary, MAR and Cardiac Rhythm a fib was reviewed with the receiving nurse. Lines:  
Peripheral IV 01/14/21 Right Other(comment) (Active) Site Assessment Clean, dry, & intact 01/20/21 1725 Phlebitis Assessment 0 01/20/21 1725 Infiltration Assessment 0 01/20/21 1725 Dressing Status Clean, dry, & intact 01/20/21 1725 Dressing Type Tape;Transparent 01/20/21 1725 Hub Color/Line Status Capped 01/20/21 1725 Action Taken Open ports on tubing capped 01/20/21 0420 Alcohol Cap Used Yes 01/20/21 1224 Peripheral IV 01/20/21 Right Wrist (Active) Site Assessment Clean, dry, & intact 01/20/21 1725 Phlebitis Assessment 0 01/20/21 1725 Infiltration Assessment 0 01/20/21 1725 Dressing Status Clean, dry, & intact 01/20/21 1725 Dressing Type Tape;Transparent 01/20/21 1725 Hub Color/Line Status Blue; Infusing;Patent 01/20/21 1725 Alcohol Cap Used Yes 01/20/21 1224 Opportunity for questions and clarification was provided. Patient transported with: 
 O2 @ 2 liters Registered Nurse Tech

## 2021-01-20 NOTE — ANESTHESIA PREPROCEDURE EVALUATION
Relevant Problems RESPIRATORY SYSTEM  
(+) Unspecified sleep apnea CARDIOVASCULAR  
(+) Acute CHF (congestive heart failure) (HCC)  
(+) HTN (hypertension) RENAL FAILURE  
(+) ARF (acute renal failure) (HCC)  
(+) Stage 3 chronic kidney disease Anesthetic History No history of anesthetic complications Review of Systems / Medical History Patient summary reviewed, nursing notes reviewed and pertinent labs reviewed Pulmonary Sleep apnea Neuro/Psych Cardiovascular Hypertension CHF Dysrhythmias : atrial fibrillation Comments: On eliquis and ASA Admitted with CHF Echo shows EF of 45-50% GI/Hepatic/Renal 
  
 
 
 
 
 
 Endo/Other Diabetes Arthritis Other Findings Physical Exam 
 
Airway Mallampati: II 
TM Distance: 4 - 6 cm Neck ROM: normal range of motion Cardiovascular Rhythm: irregular Rate: abnormal 
 
 
 
 Dental 
 
Dentition: Caps/crowns Pulmonary Breath sounds clear to auscultation Abdominal 
GI exam deferred Other Findings Comments: Bilateral leg edema- significant Anesthetic Plan ASA: 4 Anesthesia type: general 
 
 
 
 
Induction: Intravenous Anesthetic plan and risks discussed with: Patient

## 2021-01-20 NOTE — ROUTINE PROCESS
TRANSFER - OUT REPORT: 
 
Verbal report given to Janeth Alvarado RN (name) on Michel Fraction.  being transferred to OR (unit) for routine progression of care Report consisted of patients Situation, Background, Assessment and  
Recommendations(SBAR). Information from the following report(s) SBAR and Kardex was reviewed with the receiving nurse. Lines:  
Peripheral IV 01/14/21 Right Other(comment) (Active) Site Assessment Clean, dry, & intact 01/20/21 0420 Phlebitis Assessment 0 01/20/21 0420 Infiltration Assessment 0 01/20/21 0420 Dressing Status Clean, dry, & intact 01/20/21 0420 Dressing Type Transparent;Tape 01/20/21 0420 Hub Color/Line Status Pink; Infusing 01/20/21 0420 Action Taken Open ports on tubing capped 01/20/21 0420 Alcohol Cap Used Yes 01/20/21 0420 Opportunity for questions and clarification was provided. Patient transported with: 
 [place]

## 2021-01-20 NOTE — PERIOP NOTES
Got anesthesia consent over phone from spouse due to notes in system stating patient not oriented. Once patient came down to holding patient was oriented x 4 upon assessment. Called spouse and told her that he was able to sign consent and Dr. Crystal Hurtado will call after surgery

## 2021-01-20 NOTE — PROGRESS NOTES
Comprehensive Nutrition Assessment Type and Reason for Visit: (P) Initial, RD nutrition re-screen/LOS Nutrition Recommendations/Plan: Diet: Adv diet per MD; Avoid prolonged NPO diet order as it does not meet estimated needs Nutrition Assessment:  (P) pt admitted w/ acute CHF, lymphedema, cellulitis, MRSA, Diabetic Foot Ulcer with some discharge from amputated toes site-schedule for surgery, PAF, RITA on CKD 3, hypokalemia, delirium resolved Estimated Daily Nutrient Needs: 
Energy (kcal): (P) 2427; Weight Used for Energy Requirements: (P) Current Protein (g): (P) 81-97; Weight Used for Protein Requirements: (P) Ideal(1.0-1.2) Fluid (ml/day): (P) 2427; Method Used for Fluid Requirements: (P) 1 ml/kcal 
 
 
Nutrition Related Findings:  (P) Labs: K-3.2 BUN-71 GFR est AA-31 Meds: lasix, humalog, miralax, KCl; noted edema; +BM 1/16/21; currently NPO for surgery Wounds: (P) Diabetic ulcer, Surgical incision Current Nutrition Therapies: DIET NPO Anthropometric Measures: 
Height:  (P) 6' 0.01\" (182.9 cm) Current Body Wt:  (P) 99.3 kg (219 lb) Admission Body Wt:      
Usual Body Wt:  (P) 123.4 kg (272 lb)(11/14) Ideal Body Wt:  (P) 178 lbs:  (P) 123 % Adjusted Body Weight:   ; Weight Adjustment for:    
Adjusted BMI:      
BMI Category:  (P) Overweight (BMI 25.0-29. 9) Nutrition Diagnosis: (P) Inadequate oral intake related to (P) acute injury/trauma as evidenced by (P) NPO or clear liquid status due to medical condition Nutrition Interventions:  
Food and/or Nutrient Delivery: (P) Start oral diet Nutrition Education and Counseling: (P) Education needed Coordination of Nutrition Care: (P) Continue to monitor while inpatient, Interdisciplinary rounds Goals: (P) Adv diet per MD in the next 5-7days Nutrition Monitoring and Evaluation:  
Behavioral-Environmental Outcomes:   
Food/Nutrient Intake Outcomes: (P) Diet advancement/tolerance Physical Signs/Symptoms Outcomes: (P) Biochemical data, Skin, Weight, GI status, Hemodynamic status Discharge Planning:   
(P) Too soon to determine Electronically signed by Ethan Roman on 1/20/2021 at 9:14 AM

## 2021-01-20 NOTE — PROGRESS NOTES
Pharmacy Dosing Services: Vancomycin Consult for Vancomycin Dosing by Pharmacy by Dr. Nikki Albarado Consult provided for this 79y.o. year old male , for indication of Cellulitis  / possible OM of toe Day of Therapy 7 Scr = 2.47   CrCl = 28.7 ml/min    WBC = 15.7 Vancomycin Trough = 27.3   (1/20/21 at 05:45) Above therapeutic range:  15 - 20 Hold Vancomycin dose on 1/20/21. Vancomycin Random Level ordered for 1/21/21 at 12:00 Subsequent dosing to be determined based on result of random level and renal function. Pharmacy to follow daily and will make changes to dose and/or frequency based on clinical status. Ht Readings from Last 1 Encounters:  
01/20/21 182.9 cm (72.01\") Wt Readings from Last 1 Encounters:  
01/18/21 99.7 kg (219 lb 11.2 oz) Previous Regimen Vancomycin 1000 mg IV q24h Last Level Random = 20.1  (1/18/21) Other Current Antibiotics Zosyn Serum Creatinine Lab Results Component Value Date/Time Creatinine 2.47 (H) 01/20/2021 05:44 AM  
  
Creatinine Clearance Estimated Creatinine Clearance: 28.7 mL/min (A) (based on SCr of 2.47 mg/dL (H)). BUN Lab Results Component Value Date/Time BUN 71 (H) 01/20/2021 05:44 AM  
  
WBC Lab Results Component Value Date/Time WBC 15.7 (H) 01/20/2021 05:44 AM  
  
H/H Lab Results Component Value Date/Time HGB 9.0 (L) 01/20/2021 05:44 AM  
  
Platelets Lab Results Component Value Date/Time PLATELET 674 58/59/4249 05:44 AM  
  
Temp 97.7 °F (36.5 °C) Pharmacist Natalia Kong, 21 [facility]

## 2021-01-20 NOTE — INTERVAL H&P NOTE
Update History & Physical 
 
The Patient's History and Physical of January 13, 2021 was reviewed with the patient and I examined the patient. There was no change with the exception that infection is now under control. The surgical site was confirmed by the patient and me. Plan:  The risk, benefits, expected outcome, and alternative to the recommended procedure have been discussed with the patient. Patient understands and wants to proceed with the procedure.  
 
Electronically signed by Alison Gomez DPM on 1/20/2021 at 3:53 PM

## 2021-01-20 NOTE — BRIEF OP NOTE
Brief Postoperative Note Patient: Jonathan Sheffield. YOB: 1940 MRN: [id number] Date of Procedure: 1/20/2021 Pre-Op Diagnosis: CELLULITIS Post-Op Diagnosis: Same as preoperative diagnosis. Procedure(s): 
TRANSMETATARSAL AMPUTATION RIGHT FOOT, Rotational flap > 20cm, Ulcer excision (PT IN ROOM #333) Surgeon(s): 
Eryn Wilcox DPM 
 
Surgical Assistant: Surg Asst-1: Latoya Shelton Anesthesia: General  
 
Estimated Blood Loss (mL): Minimal 
 
Complications: None Specimens:  
ID Type Source Tests Collected by Time Destination 1 : RIGHT FOOT Preservative Foot, Right  [facility]39 Pathology Implants: * No implants in log * Drains: * No LDAs found * Findings: Nercotic bone Electronically Signed by Chichi Goldman DPM on 1/20/2021 at 4:44 PM

## 2021-01-20 NOTE — PROGRESS NOTES
1354: Pt c/o being hungry, scheduled for surgery at 16:00 today, wants to hold off on PT today. Will follow up tomorrow, will need to be re-evaluated for PT services.

## 2021-01-20 NOTE — PROGRESS NOTES
Problem: Falls - Risk of 
Goal: *Absence of Falls Description: Document Addy Uriostegui Fall Risk and appropriate interventions in the flowsheet. Outcome: Progressing Towards Goal 
Note: Fall Risk Interventions: 
Mobility Interventions: PT Consult for mobility concerns Mentation Interventions: Door open when patient unattended Medication Interventions: Bed/chair exit alarm, Patient to call before getting OOB, Teach patient to arise slowly Elimination Interventions: Call light in reach History of Falls Interventions: Door open when patient unattended Problem: Patient Education: Go to Patient Education Activity Goal: Patient/Family Education Outcome: Progressing Towards Goal 
  
Problem: Pressure Injury - Risk of 
Goal: *Prevention of pressure injury Description: Document Op Scale and appropriate interventions in the flowsheet. Outcome: Progressing Towards Goal 
Note: Pressure Injury Interventions: 
Sensory Interventions: Assess changes in LOC Moisture Interventions: Offer toileting Q_hr Activity Interventions: PT/OT evaluation, Pressure redistribution bed/mattress(bed type) Mobility Interventions: Pressure redistribution bed/mattress (bed type) Nutrition Interventions: Document food/fluid/supplement intake Friction and Shear Interventions: HOB 30 degrees or less Problem: Patient Education: Go to Patient Education Activity Goal: Patient/Family Education Outcome: Progressing Towards Goal 
  
Problem: Patient Education: Go to Patient Education Activity Goal: Patient/Family Education Outcome: Progressing Towards Goal 
  
Problem: Cellulitis Care Plan (Adult) Goal: *Control of acute pain Outcome: Progressing Towards Goal 
Goal: *Skin integrity maintained Outcome: Progressing Towards Goal 
Goal: *Absence of infection signs and symptoms Outcome: Progressing Towards Goal 
  
Problem: Patient Education: Go to Patient Education Activity Goal: Patient/Family Education Outcome: Progressing Towards Goal 
  
Problem: Hypertension Goal: *Blood pressure within specified parameters Outcome: Progressing Towards Goal 
Goal: *Fluid volume balance Outcome: Progressing Towards Goal 
Goal: *Labs within defined limits Outcome: Progressing Towards Goal 
  
Problem: Patient Education: Go to Patient Education Activity Goal: Patient/Family Education Outcome: Progressing Towards Goal 
  
Problem: Surgical Pathway Day of Surgery Goal: Nutrition/Diet Outcome: Progressing Towards Goal 
Goal: Medications Outcome: Progressing Towards Goal 
Goal: Respiratory Outcome: Progressing Towards Goal 
  
Problem: Risk for Spread of Infection Goal: Prevent transmission of infectious organism to others Description: Prevent the transmission of infectious organisms to other patients, staff members, and visitors. Outcome: Progressing Towards Goal 
  
Problem: Patient Education:  Go to Education Activity Goal: Patient/Family Education Outcome: Progressing Towards Goal

## 2021-01-21 NOTE — PROGRESS NOTES
Progress Note Patient: Phillip Herr MRN: [id number]  SSN: [id number] YOB: 1940  Age: 79 y.o. Sex: male 1 Day Post-Op status post Procedure(s) (LRB): 
TRANSMETATARSAL AMPUTATION RIGHT FOOT (PT IN ROOM #333) (Right) Admit Date: 2021 Admit Diagnosis: Acute CHF (congestive heart failure) ([facility] [address] [I50.9] Lymphedema [I89.0] Subjective:   
 
Pt doing fair. Is having hard time adjusting in bed and difficulty getting comfortable but no pain in the foot. No SOB. No Chest Pain. No Nausea or Vomiting. No problems eating or voiding. Objective:  
  
 
Temp (24hrs), Av.7 °F (36.5 °C), Min:97.5 °F (36.4 °C), Max:97.9 °F (36.6 °C) Body mass index is 29.79 kg/m². Patient Vitals for the past 12 hrs: 
 BP Temp Pulse Resp SpO2  
21 0248 (!) 143/72 97.9 °F (36.6 °C) 81 20 97 % Recent Labs  
  21 
0400 HGB 7.2* HCT 23.7*   
K 3.5 * CO2 24 BUN 63* CREA 2.34* GLU 99 Physical Exam: 
Vital Signs are Stable. No Acute Distress. Alert and Oriented. Negative Homans sign. Dressing has mild drainage distally. Assessment/Plan: 1. Patient doing fair. He is asking for help to sit in the chair. Explained to pt he is a fall risk and he is unable to ambulate on his own at this time. He is having no pain in his foot. Lifted head of bed for pt's comfort. 2. Mild drainage on bandage. May change outer ace wrap layer if any increased drainage. Continue to elevate. Signed By: Sherryle Civil, PA-C 2021

## 2021-01-21 NOTE — ROUTINE PROCESS
Bedside and Verbal shift change report given to Lisa Rutledge RN (oncoming nurse) by Francesca Hernandez RN (offgoing nurse). Report included the following information SBAR and Kardex.

## 2021-01-21 NOTE — PROGRESS NOTES
0700:Assumed care of pt. Pt is resting no sign of distress. 
1030: pt resting no sign of distress. Call light within reach. 
1245: Pt resting no sign of distress. Call light within reach. 
1706: Blood transfusion started. Pt educated.  Pt vitals are within normal limits. No signs of distress. 
1830: pt tolerated blood transfusion well. 
Bedside shift change report given to Bernice JIMENEZ RN (oncoming nurse) by FELI Alvarenga RN (offgoing nurse). Report included the following information SBAR, Kardex, Intake/Output and MAR.

## 2021-01-21 NOTE — PROGRESS NOTES
Problem: Mobility Impaired (Adult and Pediatric) Goal: *Acute Goals and Plan of Care (Insert Text) Description: Physical Therapy Goals Initiated 1/14/2021 and to be accomplished within 7 day(s) 1. Patient will move from supine <> sit with SBA in prep for out of bed activity and change of position. 2.  Patient will perform sit<> stand with CGA/min A/RW in prep for transfers/ambulation. 3.  Patient will transfer from bed <> chair with CGA/min A/RW for time up in chair for completion of ADL activity. 4.  Patient will ambulate 50 feet with CGA/MIN A/RW for improved functional mobility at discharge. 5.  Patient will ascend/descend 3-5 stairs with handrail(s) with minimal assistance/contact guard assist for home re-entry as needed. Physical Therapy Goals Updated 1/21/2021 and to be accomplished within 7 day(s) 1. Patient will move from supine <> sit with CGA in prep for out of bed activity and change of position. 2.  Patient will perform sit<> stand with min/modA/RW in prep for transfers/ambulation. 3.  Patient will transfer from bed <> chair with min/modA/RW for time up in chair for completion of ADL activity. 4.  Patient will ambulate 20 feet with MIN A/RW for improved functional mobility at discharge. Note: PHYSICAL THERAPY REEVALUATION AND TREATMENT Patient: Elena Patel (79 y.o. male) Date: 1/21/2021 Diagnosis: [facility] [I50.9] Lymphedema [I89.0] Cellulitis and abscess of leg, except foot Procedure(s) (LRB): 
TRANSMETATARSAL AMPUTATION RIGHT FOOT (PT IN ROOM #333) (Right) 1 Day Post-Op Precautions: Fall, NWB R LE 
PLOF: Pt was independent with household mobility with RW and community mobility motorized wc PTA.    
 
ASSESSMENT: 
 Pt no s/p R TMA, re-evaluation performed. Pt continues to present with decr strength and ROM, decr balance, decr activity tolerance resulting in overall deficits in functional mobility. Pt supine in bed on PT entry, denied pain in R foot. Pt with limited AROM to LEs, unable to perform SLR, minimal knee flex/ext noted B. Pt requesting to scoot up in bed, maxA required to scoot up and laterally in bed in order to improve pt position and comfort. Pt would benefit from continued therapy in order to incr functional mobility and decr need for assistance. Recommend SNF on discharge. Progression toward goals:  
[]      Improving appropriately and progressing toward goals [x]      Improving slowly and progressing toward goals 
[]      Not making progress toward goals and plan of care will be adjusted PLAN: 
Patient continues to benefit from skilled intervention to address the above impairments. Continue treatment per established plan of care. Discharge Recommendations:  Anup Stovall Further Equipment Recommendations for Discharge:  TBD at next level of care SUBJECTIVE:  
Patient stated i'm doing okay.  OBJECTIVE DATA SUMMARY:  
Critical Behavior: 
Neurologic State: Appropriate for age Orientation Level: Oriented X4 Cognition: Follows commands Safety/Judgement: Lack of insight into deficits, Decreased awareness of environment, Decreased awareness of need for assistance Functional Mobility Training: 
Bed Mobility: 
Scooting: Maximum assistance(vc, tc) Range Of Motion: 
 AROM: Generally decreased, functional 
 PROM: Generally decreased, functional 
Ambulation/Gait Training: 
Right Side Weight Bearing: (s/p R TMA, no weight bearing status ordered, treated NWB) Pain: 
Pain level pre-treatment: 0/10 Pain level post-treatment: 0/10 Pain Intervention(s): Medication (see MAR); Rest, Ice, Repositioning Response to intervention: Nurse notified, See doc flow Activity Tolerance: Pt with decr activity tolerance, requiring maxA and vc for bed mobility. Please refer to the flowsheet for vital signs taken during this treatment. After treatment:  
[] Patient left in no apparent distress sitting up in chair 
[x] Patient left in no apparent distress in bed 
[x] Call bell left within reach [x] Nursing notified 
[] Caregiver present 
[] Bed alarm activated 
[] SCDs applied COMMUNICATION/EDUCATION:  
[x]         Role of Physical Therapy in the acute care setting. [x]         Fall prevention education was provided and the patient/caregiver indicated understanding. [x]         Patient/family have participated as able in working toward goals and plan of care. [x]         Patient/family agree to work toward stated goals and plan of care. []         Patient understands intent and goals of therapy, but is neutral about his/her participation. []         Patient is unable to participate in stated goals/plan of care: ongoing with therapy staff. 
[]         Other: Zeina Mitchell Time Calculation: 32 mins

## 2021-01-21 NOTE — OP NOTES
[facility] 
OPERATIVE REPORT Name:  Toby Pepe 
MR#:   [id number] :  1940 ACCOUNT #:  [de-identified] DATE OF SERVICE:  2021 PREOPERATIVE DIAGNOSES: 
1.   Osteomyelitis, right foot. 2.   Chronic ulceration, right foot. 3.   Status post second metatarsal head resection, right foot. POSTOPERATIVE DIAGNOSES: 
1.   Osteomyelitis, right foot. 2.   Chronic ulceration, right foot. 3.   Status post second metatarsal resection, right foot. PROCEDURE PERFORMED: 
1. Excision of ulcer, right foot. 2.   Transmetatarsal amputation, right foot. 3.   Full-thickness rotational flap, greater than 20 sq cm, right foot. SURGEON:  Mavis Wright DPM 
 
ASSISTANT:  Cristel Gomez ANESTHESIA:  General. 
 
COMPLICATIONS:  None. SPECIMENS REMOVED:  Foot IMPLANTS:  None. ESTIMATED BLOOD LOSS:  None INDICATIONS:  This is an unfortunate 43-year-old male who has had history of osteomyelitis and chronic ulcerations. He has undergone amputations in the past.  At this point, he developed a plantar ulceration of the second metatarsal head, for which he underwent resection. At this point, he is here today for transmetatarsal amputation and rotational flap wound closure. Complications include delayed healing, nonhealing, continued pain and discomfort, ulceration, and infection. No guarantees were given. Informed consent was obtained. PREPARATION OF PROCEDURE:  The patient was taken to the operating room and placed on the table in supine position. After the induction of IV sedation and administration of a general anesthetic, a well-padded pneumatic midcalf tourniquet was applied. The patient's right lower extremity was then prepped and draped in the usual sterile manner. The blood was then exsanguinated with the use of an Esmarch bandage and the pneumatic midcalf tourniquet was inflated to 250 mmHg.  
 
PROCEDURE: 
 1. Excision of ulceration, right foot: At this point, a circumferential incision was made around the plantar aspect of the right foot. The incision was deepened down to the level of the subcutaneous tissue, taking care to Bovie retract or ligate all neurovascular structures as necessary. The ulcer was excised in its entirety, down to good bleeding subcutaneous tissue and muscle. 2.  Transmetatarsal amputation, right foot:  Attention was now directed to the distal aspect of the right foot, after which point, transmetatarsal amputation was carried out in the usual fishmouth incision technique. I performed subperiosteal dissection with the use of a Key elevator and 15-blade, after which point, transmetatarsal amputation was carried out, maintaining the anatomic parabola. The foot was disarticulated and sent to Pathology for gross and microscopic examination. 3. Full-thickness rotational flap, greater than 20 sq cm, right:  At this point, there was a fairly large deficit left behind from the previous open ulceration and surgical correction. I needed to be able to close the wound as best I could, so I extended the incision more proximally on the medial aspect of the foot. I performed a subperiosteal dissection and I was able to isolate the abductor hallucis muscle belly and rotate it medially up and over the first metatarsal.  I was able to free some of the lateral soft tissue as well by isolating the abductor digiti minimi. I then flapped the wound dorsally but rotated it in such a fashion so as to close the large deficit created by the previous open ulceration with surgical intervention. The surgical site was copiously irrigated and the subcutaneous tissue was then repaired with 3-0 Vicryl. The skin was then repaired with the use of 3-0 Prolene and 3-0 nylon. Surgical site was then dressed with Xeroform, 4x4s, Andressa, and Ace bandage dressing. The patient was then transported from the OR to the PACU with vital signs stable and vascular status intact. He will be followed up as an inpatient. Fernando Morel DPM 
 
 
[id number]/[id number]_P 
D:  01/20/2021 16:50 
T:  01/21/2021 3:11 
JOB #:  [id number]

## 2021-01-21 NOTE — PROGRESS NOTES
Care manager rounded on patient; patient wanting to get OOB but reinforced info from AUGUSTINE Mccain - no recommendations yet for PT or weight bearing status. Patient reiterated desire to go home with [facility]. Care management to continue to follow for discharge needs.

## 2021-01-21 NOTE — PROGRESS NOTES
2030-alert and oriented x 4. Lungs CTA on 1LNC. BS active x 4 quads. Ace wrap in place to right foot/LE, CDI. Right leg with +3 pitting edema and left leg red with +4 pitting edema. Open areas, scabs, noted to left LE. On tele box 5 showing A-fib with controlled rate. Offered food, but patient refused. He said he only wanted water. Denies pain. Shift summary-patient alert and oriented to self, situation, place, and sometimes time. IV ATB given with no adverse effects. Ace wrap remains CDI.

## 2021-01-21 NOTE — ROUTINE PROCESS
Bedside and Verbal shift change report given to SVETLANA Mcfarland RN (oncoming nurse) by Roxie Moya RN (offgoing nurse). Report included the following information SBAR, Kardex, Intake/Output and MAR.

## 2021-01-21 NOTE — PROGRESS NOTES
Pharmacy Dosing Services: Vancomycin Consult for Vancomycin Dosing by Pharmacy by Dr. Erin Dumont Consult provided for this 79y.o. year old male , for indication of SSTI Day of Therapy 8 Scr = 2.34   CrCl = 30.3 ml/min Vancomycin was on HOLD Vancomycin Random Level = 19 (1/21/21 at 12:50) Continue therapy with:  Vancomycin 750 mg IV once, scheduled for 1/21/21 at 18:00 Subsequent dosing to be determined after assessment of renal function on 1/22/21 Goal therapeutic trough:  15 - 20 Pharmacy to follow daily and will make changes to dose and/or frequency based on clinical status Pharmacist [address], [address] [place] [address]

## 2021-01-21 NOTE — ROUTINE PROCESS
Bedside and Verbal shift change report given to RITIKA Pennington RN (oncoming nurse) by Marko Strauss RN (offgoing nurse). Report included the following information SBAR, Kardex, Intake/Output and MAR.

## 2021-01-21 NOTE — PROGRESS NOTES
Hospitalist Progress Note-critical care note Patient: Rosana Vanegas MRN: [id number]  CSN: [id number] YOB: 1940  Age: 79 y.o. Sex: male DOA: 1/13/2021 LOS:  LOS: 8 days Chief complaint: Hypokalemia, toe amputated, diabetes foot ulcer DM CHF cellulitis Assessment/Plan Hospital Problems  Date Reviewed: 1/13/2021 Codes Class Noted POA Hypokalemia ICD-10-CM: E87.6 ICD-9-CM: 276.8  1/14/2021 Unknown Amputation of right great toe [facility]) ICD-10-CM: T99.277D ICD-9-CM: 895.0  1/14/2021 Unknown Diabetic ulcer of right foot associated with type 2 diabetes mellitus ([facility] [address] ICD-10-CM: E11.621, P82.687 ICD-9-CM: 250.80, 707.15  1/14/2021 Unknown Stage 3 chronic kidney disease ICD-10-CM: N18.30 ICD-9-CM: 585.3  1/14/2021 Unknown Lymphedema ICD-10-CM: I89.0 ICD-9-CM: 457.1  1/13/2021 Unknown Acute CHF (congestive heart failure) (HCC) ICD-10-CM: I50.9 ICD-9-CM: 428.0  1/13/2021 Unknown * (Principal) Cellulitis and abscess of leg, except foot ICD-10-CM: L03.119, L02.419 ICD-9-CM: 682.6  1/13/2013 Yes HTN (hypertension) ICD-10-CM: I10 
ICD-9-CM: 401.9  1/13/2013 Yes Pt was admitted for sob and suspected chf exacerbation, also leg cellilitis and possible toe OM Diabetic Foot Ulcer with some discharge from amputated toes site Drain and debridement per Dr. Dayna Hogue, 
 followed by  
1 Excision of ulcer, right foot. 2.   Transmetatarsal amputation, right foot. 3.   Full-thickness rotational flap, greater than 20 sq cm, right foot. Like need long term iv abx Will have id on board tomorrow Continue local wound care Wound cx mrsa Acute CHF (congestive heart failure) ([facility] [place] [address] (1/13/2021) No sob  echo with ef 40 %,  
Cardiologist on board Lymphedema (1/13/2021) No dvt elevated leg Improving Cellulitis Mild improving IV Zosyn/Vanc-Pharmacy dosing 
  
    
   
PAF 
 eliquis hold, heparin gtt , Will restart eliquis if podiatrist is ok Rate controlled per atenolol Replace K  
  
laura- ckd3 Cr stable, stable now Continue to  hold lasix and losartan Continue monitoring cr  
 
DM type II Continue ssi Hypokalemia  
k replacement Delirium resolved Will give one unit blood to keep at 8 due to chf  
Subjective : I  Feel good Rn: stable Disposition :tbd, Review of systems: 
 
General: No fevers or chills. Cardiovascular: No chest pain or pressure. No palpitations. Pulmonary: No shortness of breath. Gastrointestinal: No nausea, vomiting. Vital signs/Intake and Output: 
Visit Vitals BP (!) 140/78 Pulse 88 Temp 97.4 °F (36.3 °C) Resp 14 Ht 6' 0.01\" (1.829 m) Wt 99.7 kg (219 lb 11.2 oz) SpO2 100% BMI 29.79 kg/m² Current Shift:  No intake/output data recorded. Last three shifts:  01/19 1901 - 01/21 0700 In: 389.3 [I.V.:389.3] Out: 100 [Urine:100] Physical Exam: 
General: WD, WN. Alert, cooperative, no acute distress HEENT: NC, Atraumatic. PERRLA, anicteric sclerae. Lungs: CTA Bilaterally. No Wheezing/Rhonchi/Rales. Heart:  Regular  rhythm,  No murmur, No Rubs, No Gallops Abdomen: Soft, Non distended, Non tender. +Bowel sounds, Extremities: No c/c. Skin lesion noted on bilateral legs ,improving, rt foot covered wrapped with ace bandage Psych:   Not anxious or agitated. Neurologic:  No acute neurological deficit. Labs: Results:  
   
Chemistry Recent Labs  
  01/21/21 
0400 01/20/21 
0544 01/19/21 
0350 GLU 99 115* 125*  144 145  
K 3.5 3.2* 3.4*  
* 112* 111 CO2 24 23 24 BUN 63* 71* 61* CREA 2.34* 2.47* 2.20* CA 7.8* 8.8 8.0* AGAP 8 9 10 BUCR 27* 29* 28* CBC w/Diff Recent Labs  
  01/21/21 
0400 01/20/21 
0544 01/19/21 
0350 WBC 14.0* 15.7* 16.0*  
RBC 2.50* 3.03* 3.04* HGB 7.2* 9.0* 8.7* HCT 23.7* 28.2* 28.0*  
 279 312 GRANS 77* 75* 79* LYMPH 9* 11* 11* EOS 4 5 1 Cardiac Enzymes No results for input(s): CPK, CKND1, DEQUAN in the last 72 hours. No lab exists for component: Debria Clock Coagulation Recent Labs  
  01/21/21 
0400 01/20/21 
0545 APTT 36.2 115.6* Lipid Panel No results found for: CHOL, CHOLPOCT, CHOLX, CHLST, CHOLV, 936426, HDL, HDLP, LDL, LDLC, DLDLP, 975940, VLDLC, VLDL, TGLX, TRIGL, TRIGP, TGLPOCT, CHHD, CHHDX  
BNP No results for input(s): BNPP in the last 72 hours. Liver Enzymes No results for input(s): TP, ALB, TBIL, AP in the last 72 hours. No lab exists for component: SGOT, GPT, DBIL Thyroid Studies No results found for: T4, T3U, TSH, TSHEXT, TSHEXT Procedures/imaging: see electronic medical records for all procedures/Xrays and details which were not copied into this note but were reviewed prior to creation of Plan Mri Foot Rt Wo Cont Result Date: 1/14/2021 Procedure: MRI of the right forefoot without contrast. HISTORY: -From Provider: diabetic ulcer -Additional: Ulcer distal plantar right foot Comparisons: None Technique: Sagittal and coronal T1 and STIR; axial T1 and T2 with fat saturation sequences of the forefoot were obtained. Intravenous gadolinium was not requested or obtained. The study is motion degraded even with repeat attempt, which does limit sensitivity. Subtle diagnostic for clinical question. Findings: 2nd digit: There is heterogeneous soft tissue thickening surrounding the distal aspect of the head of the 2nd metatarsal, with surrounding foci of low signal intensity, suspicious for gas, largest dorsal lateral to the 2nd metatarsal head. There is adjacent heterogeneously T1 hypointense, T2/STIR hyperintense thickening in the subcutaneous fat, extending to the skin margin of the 2nd digit, at the site of suspected dressing. This may correspond to the site of the ulcer, but should be clinically correlated. There is diffuse fatty change in the intrinsic foot musculature, with focally heterogeneously hyperintense STIR signal in the soft tissues surrounding the 2nd metatarsal diaphysis. Indistinct cortical margin of the 2nd metatarsal head, subjacent patchy T1 hypointense STIR and T2 hyperintense marrow signal in the head and distal diaphysis of the 2nd metatarsal. 1st digit: Amputation of the phalanges distal to the 1st metatarsal head. Cartilage loss and subchondral marrow hyperintensity in the medial cuneiform subjacent to the 1st tarsal metatarsal joint and mild spurring at the 1st metatarsal-sesamoid joint. The 1st and 2nd phalanges are absent, suggesting prior amputation. There is thickening of the subcutaneous fat and skin at the plantar aspect of the 2nd and 3rd MTP joints. There is skin puckering at the plantar aspect of the foot between the 3rd and 4th MTP joints.  There is dorsiflexion at the MTP and plantarflexion at the DIP joints of the 3rd-5th digits. No acute associated marrow signal abnormalities are seen Impression: Images significant degraded by motion artifact. 1. Findings suspicious for osteomyelitis in the head and neck of the 2nd metatarsal, with surrounding cellulitis. Recommend correlation with clinical and laboratory findings. 2. Foci of low signal intensity in the soft tissues about the head of the 2nd metatarsal are suspicious for gas, and can be seen in the setting of gas forming infection or recent intervention. 3. Edema/myositis in the fatty replaced intrinsic foot musculature about the 2nd metatarsal 4. Plantar subcutaneous fat and skin thickening at the 1st and 2nd metatarsal heads, which can be related to cellulitis or chronic changes. Skin puckering at the plantar 3rd-4th metatarsal interspace is age indeterminate, and can reflect chronic or acute ulcerative etiology. 5. Presumed amputation of the 1st and 2nd phalanges distal to the metatarsal heads. Degenerative changes at the 1st tarsal metatarsal and 1st metatarsal sesamoid joint. Ct Head Wo Cont Result Date: 1/13/2021 EXAM: CT HEAD, WITHOUT IV CONTRAST INDICATION: Lethargy, headache, bilateral lower extremity swelling COMPARISON: None TECHNIQUE: Multiple axial CT images of the head were obtained extending from the skull base through the vertex. Additional coronal and sagittal reformations were also performed. One or more dose reduction techniques were used on this CT: automated exposure control, adjustment of the mAs and/or kVp according to patient size, and iterative reconstruction techniques. The specific techniques used on this CT exam have been documented in the patient's electronic medical record. Digital Imaging and Communications in Medicine (DICOM) format image data are available to nonaffiliated external healthcare facilities or entities on a secure, media free, reciprocally searchable basis with patient authorization for at least a 12-month period after this study. _______________ FINDINGS: BRAIN AND POSTERIOR FOSSA: There are a few small, probably chronic right and left basal ganglia lacunar infarcts. There is generalized prominence of the ventricular system, associated with proportional widening of the cortical cerebral sulci, compatible with generalized volume loss. Hazy hypoattenuation identified along the periventricular white matter, a nonspecific finding which is most commonly encountered in the setting of chronic microvascular disease. There is no intracranial hemorrhage, mass effect, or midline shift. There are no significant additional areas of abnormal parenchymal attenuation. EXTRA-AXIAL SPACES AND MENINGES: There are no abnormal extra-axial fluid collections. CALVARIUM: No acute osseous abnormality. OTHER: The visualized portions of the paranasal sinuses and mastoid air cells are clear. _______________ IMPRESSION: 1. No CT evidence of an acute intracranial abnormality - in particular, no acute cortical infarct, hemorrhage, or mass effect. 2.  Mild cerebral atrophy/volume loss and periventricular white matter changes, most commonly seen with chronic microvascular disease. Xr Chest [facility] Result Date: 1/13/2021 EXAM: XR CHEST PORT CLINICAL INDICATION/HISTORY: gen weakness -Additional: None COMPARISON: 1/8/2021 TECHNIQUE: Portable frontal view of the chest _______________ FINDINGS: SUPPORT DEVICES: None. HEART AND MEDIASTINUM: Cardiomediastinal silhouette within normal limits. LUNGS AND PLEURAL SPACES: Hypoinflated lungs. No dense consolidation, large effusion or pneumothorax. _______________ IMPRESSION: No acute cardiopulmonary abnormality. Xr Chest [facility] Result Date: 1/8/2021 EXAM: XR CHEST PORT CLINICAL INDICATION/HISTORY: Chest pain -Additional: None COMPARISON: 11/17/2014 TECHNIQUE: Portable frontal view of the chest _______________ FINDINGS: SUPPORT DEVICES: None. HEART AND MEDIASTINUM: Cardiomegaly. LUNGS AND PLEURAL SPACES: No dense consolidation, large effusion or pneumothorax. _______________ IMPRESSION: Cardiomegaly. No acute radiographic abnormality. Duplex Lower Ext Venous Bilat Result Date: 1/13/2021 · No evidence of deep vein thrombosis in the right lower extremity veins assessed. · No evidence of deep vein thrombosis in the left lower extremity veins assessed. Patency of mid to distal femoral vein bilaterally is done by the color flow and doppler signal only Duplex Lower Ext Artery Bilat Result Date: 1/14/2021 No evidence of any significant peripheral arterial disease in the bilateral lower extremity. Triphasic waveforms noted throughout.  
 
 
Blanche Stephens MD

## 2021-01-22 PROBLEM — M86.671 CHRONIC OSTEOMYELITIS OF RIGHT FOOT (HCC): Status: ACTIVE | Noted: 2021-01-01

## 2021-01-22 PROBLEM — A49.02 MRSA INFECTION: Status: ACTIVE | Noted: 2021-01-01

## 2021-01-22 NOTE — PROGRESS NOTES
1934 - Bedside report received from ANNABEL WARE RN. Patient in bed. Pain 0/10. Blood infusing at 125, pt jazmin. Well. 
 
2020 - Patient in bed at this time. IV to RA and L FA x2  intact and patent.  + CMS. General Edema from waist down, bev area all swollen. Pt A & O x 2, confused, thinks he is at home. LS clear, dim, on 2L NC. Abdomen soft, NT and ND. + BS to all 4 quadrants. Denies nausea. Pain 0/10. Call light within reach. 2100-Blood transfusion done, no s/s of rxn. Per report, no need for a H & H after, just the AM CBC. Pt had an uneventful shift. No issues/concerns at this time. Call bell within reach Pt very sore with any T $ P or any movement.

## 2021-01-22 NOTE — PROGRESS NOTES
1355 - chart reviewed for possible tunneled central line. Pt received eliquis @0901hrs. Per IR protocol pt must eliquis hold for full 48hours. Will plan for Monday 1/25. Unable to reach pt nurse at this time.

## 2021-01-22 NOTE — ROUTINE PROCESS
Bedside and Verbal shift change report given to Justyn Barcenas RN by Elda Castaneda. Report included the following information SBAR, Kardex, OR Summary, Intake/Output and MAR.

## 2021-01-22 NOTE — PROGRESS NOTES
DC Plan: SNF vs HH Met with patient to discuss that he has been accepted to [facility], [facility]; will need updated PCR covid test in order to place; patient verbalized that he wants to go home; if he is to go home, will need long term line and IV abx arranged. Care management will continue to follow based on safe discharge plan.

## 2021-01-22 NOTE — CONSULTS
[facility] 
(A Division of [address] [place] [address]) Consultation Note Date of Admission: 1/13/2021    Date of Consultation: 1/22/2021 Referred by: Yajaira Chavez MD 
 
Reason for Referral: R foot OM Current Antimicrobials:    Prior Antimicrobials: 1. Vanco IV (1/13-) #9 2. Pip/tzb IV (1/13-) #9 Assessment: Rec / Plan:  
Extensive R foot MRSA OM/infection deep - DOS 1/20 1/22:  CRP pending Has had excellent surgical control of deep tissue; now need to consolidate post-op ABx (vancomycin) for weeks. No recovery of any other than MRSA, and with the extensive removal of infected tissue, can likely consolidate at this point to monotherapy against MRSA. ->POD #2 Needs tunneled line due to CKD Reset abx days to last surgery\ Vancomycin at current dosing (in flux) Termination date - 1ETF82 Weekly labs (qMondays) - CBC with diff, BMP, vanco level for PHARM to dose, and quantitative CRP. I will look in on Monday for final recs (but this is good start). CKD stage IV/III   
DMT2 good control A1c 6% Chronic Lymphedema HTN Microbiology:  1/15 - OR (+) MRSA 
   1/13 - BCx x 2 (-) Lines / Catheters: peripherals HPI: 
CC:  \"I feel better\" Mr Luda Hobsb is an 79y WM with underlying CKD/lymphedema who presented 1/13 with several week h/o progressive R foot infection for which he went to OR 1/15 for initial I&D and then more complete TMA 1/20 with skin flap closure of initial wound. Bleh. Only MRSA has grown. He has been on vanco + pip/tzb since admission. Kidneys a little worse. He feels fine today. Talkative. Retired  with extensive [address] travel Active Hospital Problems Diagnosis Date Noted  Hypokalemia 01/14/2021  Amputation of right great toe ([facility] 01/14/2021  Diabetic ulcer of right foot associated with type 2 diabetes mellitus ([facility] 01/14/2021  Stage 3 chronic kidney disease 01/14/2021  Lymphedema 01/13/2021  Acute CHF (congestive heart failure) ([facility] 01/13/2021  Cellulitis and abscess of leg, except foot 01/13/2013  
 HTN (hypertension) 01/13/2013 Past Medical History:  
Diagnosis Date  ARF (acute renal failure) ([facility] 6/3/2013  Arrhythmia A-Fib  Arthritis  Atrial fibrillation ([facility]  Cellulitis  Hypertension  Unspecified sleep apnea   
 recently notified doesn't need c-pap Past Surgical History:  
Procedure Laterality Date  HX CATARACT REMOVAL    
 HX ORTHOPAEDIC    
 bilateral knee replacments  HX ORTHOPAEDIC  2017  
 betancourt procedure Family History Problem Relation Age of Onset  Cancer Mother  Heart Disease Father  Cancer Sister  Hypertension Sister Social History Socioeconomic History  Marital status:  Spouse name: Not on file  Number of children: Not on file  Years of education: Not on file  Highest education level: Not on file Occupational History  Not on file Social Needs  Financial resource strain: Not on file  Food insecurity Worry: Not on file Inability: Not on file  Transportation needs Medical: Not on file Non-medical: Not on file Tobacco Use  Smoking status: Never Smoker  Smokeless tobacco: Never Used Substance and Sexual Activity  Alcohol use: No  
 Drug use: No  
 Sexual activity: Not on file Lifestyle  Physical activity Days per week: Not on file Minutes per session: Not on file  Stress: Not on file Relationships  Social connections Talks on phone: Not on file Gets together: Not on file Attends Synagogue service: Not on file Active member of club or organization: Not on file Attends meetings of clubs or organizations: Not on file Relationship status: Not on file  Intimate partner violence Fear of current or ex partner: Not on file Emotionally abused: Not on file Physically abused: Not on file Forced sexual activity: Not on file Other Topics Concern  Not on file Social History Narrative  Not on file Allergies: 
Ambien [zolpidem] Medications: 
Current Facility-Administered Medications Medication Dose Route Frequency  [START ON 1/23/2021] Vancomycin Random level due 1/23/21 with am labs   1 Each Other ONCE  
 0.9% sodium chloride infusion 250 mL  250 mL IntraVENous PRN  
 HYDROmorphone (PF) (DILAUDID) injection 1 mg  1 mg IntraMUSCular Q4H PRN  
 0.9% sodium chloride infusion 250 mL  250 mL IntraVENous PRN  
 hydrALAZINE (APRESOLINE) tablet 25 mg  25 mg Oral Q8H PRN  
 amLODIPine (NORVASC) tablet 5 mg  5 mg Oral DAILY  [Held by provider] furosemide (LASIX) tablet 20 mg  20 mg Oral DAILY  oxyCODONE-acetaminophen (PERCOCET) 5-325 mg per tablet 1 Tab  1 Tab Oral Q4H PRN  
 latanoprost (XALATAN) 0.005 % ophthalmic solution 1 Drop  1 Drop Both Eyes QPM  
 insulin lispro (HUMALOG) injection   SubCUTAneous AC&HS  
 glucose chewable tablet 16 g  4 Tab Oral PRN  
 glucagon (GLUCAGEN) injection 1 mg  1 mg IntraMUSCular PRN  
 dextrose 10% infusion 125-250 mL  125-250 mL IntraVENous PRN  
 sodium chloride (NS) flush 5-10 mL  5-10 mL IntraVENous PRN  
 apixaban (ELIQUIS) tablet 2.5 mg  2.5 mg Oral BID  atenoloL (TENORMIN) tablet 50 mg  50 mg Oral QPM  
 [Held by provider] potassium chloride (KLOR-CON) tablet 10 mEq  10 mEq Oral BID  sodium chloride (NS) flush 5-40 mL  5-40 mL IntraVENous Q8H  
 sodium chloride (NS) flush 5-40 mL  5-40 mL IntraVENous PRN  
 acetaminophen (TYLENOL) tablet 650 mg  650 mg Oral Q6H PRN Or  
 acetaminophen (TYLENOL) suppository 650 mg  650 mg Rectal Q6H PRN  polyethylene glycol (MIRALAX) packet 17 g  17 g Oral DAILY PRN  promethazine (PHENERGAN) tablet 12.5 mg  12.5 mg Oral Q6H PRN  Or  
 ondansetron (ZOFRAN) injection 4 mg  4 mg IntraVENous Q6H PRN  
  Vancomycin - Pharmacy to Dose  1 Each Other Rx Dosing/Monitoring ROS: 
Constitutional: positive for fatigue and malaise, negative for fevers, chills and sweats Respiratory: negative for cough or dyspnea on exertion Cardiovascular: negative for chest pain Gastrointestinal: negative for nausea, vomiting, diarrhea and abdominal pain Physical Exam: HPI: 
Temp (24hrs), Av.2 °F (36.2 °C), Min:97 °F (36.1 °C), Max:97.6 °F (36.4 °C) Visit Vitals /66 (BP 1 Location: Right arm, BP Patient Position: At rest) Pulse 83 Temp 97.1 °F (36.2 °C) Resp 14 Ht 6' 0.01\" (1.829 m) Wt 99.7 kg (219 lb 11.2 oz) SpO2 100% BMI 29.79 kg/m² General: Well developed, well nourished 79 y.o. WHITE OR  male in no acute distress. ENT: ENT exam normal, no neck nodes or sinus tenderness Head: normocephalic, without obvious abnormality Mouth:  mucous membranes moist, pharynx normal without lesions Neck: supple, symmetrical, trachea midline Cardio:  Iregular rate and rhythm, S1, S2 normal, faint I/II holosystolic M precordium; no click, rub or gallop Chest: inspection normal - no chest wall deformities or tenderness, respiratory effort normal 
Lungs: clear to auscultation, no wheezes or rales and unlabored breathing Abdomen: soft, non-tender. Bowel sounds normal. No masses, no organomegaly. Extremities:  bilat lymphedema (L>R); R foot wrapped. Neuro: Grossly normal  
 
 
Lab results: 
 
Chemistry Recent Labs  
  21 
0400 21 
0544 * 99 115*  145 144  
K 3.5 3.5 3.2*  
* 113* 112* CO2 21 24 23 BUN 68* 63* 71* CREA 2.62* 2.34* 2.47* CA 8.3* 7.8* 8.8 AGAP 10 8 9 BUCR 26* 27* 29* CBC w/ Diff Recent Labs  
  21 
0400 21 
0544 WBC 13.8* 14.0* 15.7*  
RBC 3.09* 2.50* 3.03* HGB 8.9* 7.2* 9.0*  
HCT 28.9* 23.7* 28.2*  
 330 279 GRANS 72 77* 75* LYMPH 15* 9* 11* EOS 4 4 5 Microbiology All Micro Results Procedure Component Value Units Date/Time CULTURE, BLOOD [[id number]] Collected: 01/13/21 2100 Order Status: Completed Specimen: Blood Updated: 01/19/21 0715 Special Requests: NO SPECIAL REQUESTS Culture result: NO GROWTH 6 DAYS     
 CULTURE, BLOOD [[id number]] Collected: 01/13/21 2110 Order Status: Completed Specimen: Blood Updated: 01/19/21 0715 Special Requests: NO SPECIAL REQUESTS Culture result: NO GROWTH 6 DAYS     
 CULTURE, Colin Hacking STAIN [[id number]]  (Abnormal)  (Susceptibility) Collected: 01/15/21 1520 Order Status: Completed Specimen: Wound Drainage Updated: 01/18/21 1003 Special Requests: NO SPECIAL REQUESTS     
  GRAM STAIN OCCASIONAL WBCS SEEN     
   NO ORGANISMS SEEN Culture result: FEW * METHICILLIN RESISTANT STAPHYLOCOCCUS AUREUS *  
     
 CULTURE, ANAEROBIC [[id number]] Collected: 01/15/21 1520 Order Status: Completed Specimen: Foot, Right Updated: 01/17/21 1432 Special Requests: NO SPECIAL REQUESTS Culture result: NO ANAEROBES ISOLATED Jerome Bell MD 
Cell [phone] [facility] 1/22/2021  
12:28 PM

## 2021-01-22 NOTE — ANESTHESIA POSTPROCEDURE EVALUATION
Post-Anesthesia Evaluation and Assessment Cardiovascular Function/Vital Signs Visit Vitals /87 (BP 1 Location: Left arm, BP Patient Position: At rest) Pulse 84 Temp 36.4 °C (97.5 °F) Resp 16 Ht 6' 0.01\" (1.829 m) Wt 99.7 kg (219 lb 11.2 oz) SpO2 100% BMI 29.79 kg/m² Patient is status post Procedure(s): 
TRANSMETATARSAL AMPUTATION RIGHT FOOT (PT IN ROOM #333). Nausea/Vomiting: Controlled. Postoperative hydration reviewed and adequate. Pain: 
Pain Scale 1: Numeric (0 - 10) (01/21/21 0248) Pain Intensity 1: 0 (01/21/21 0248) Managed. Neurological Status:  
Neuro (WDL): Exceptions to WDL (01/20/21 1725) At baseline. Mental Status and Level of Consciousness: Baseline and appropriate for discharge. Pulmonary Status:  
O2 Device: Nasal cannula (01/21/21 2020) Adequate oxygenation and airway patent. Complications related to anesthesia: None Post-anesthesia assessment completed. No concerns. Patient has met all discharge requirements. Signed By: Kathy Mcgrath MD  
 January 22, 2021

## 2021-01-22 NOTE — PROGRESS NOTES
Hospitalist Progress Note-critical care note Patient: Buffy Knight MRN: [id number]  CSN: [id number] YOB: 1940  Age: 79 y.o. Sex: male DOA: 1/13/2021 LOS:  LOS: 9 days Chief complaint: Hypokalemia, toe amputated, diabetes foot ulcer DM CHF cellulitis Assessment/Plan Hospital Problems  Date Reviewed: 1/13/2021 Codes Class Noted POA Chronic osteomyelitis of right foot ([facility] ICD-10-CM: T13.732 ICD-9-CM: 730.17  1/22/2021 Unknown MRSA infection ICD-10-CM: A49.02 
ICD-9-CM: 041.12  1/22/2021 Unknown Hypokalemia ICD-10-CM: E87.6 ICD-9-CM: 276.8  1/14/2021 Unknown Amputation of right great toe [facility]) ICD-10-CM: G40.004I ICD-9-CM: 895.0  1/14/2021 Unknown Diabetic ulcer of right foot associated with type 2 diabetes mellitus ([facility] ICD-10-CM: E11.621, K95.290 ICD-9-CM: 250.80, 707.15  1/14/2021 Unknown Stage 3 chronic kidney disease ICD-10-CM: N18.30 ICD-9-CM: 585.3  1/14/2021 Unknown Lymphedema ICD-10-CM: I89.0 ICD-9-CM: 457.1  1/13/2021 Unknown Acute CHF (congestive heart failure) ([facility]) ICD-10-CM: I50.9 ICD-9-CM: 428.0  1/13/2021 Unknown * (Principal) Cellulitis and abscess of leg, except foot ICD-10-CM: L03.119, L02.419 ICD-9-CM: 682.6  1/13/2013 Yes HTN (hypertension) ICD-10-CM: I10 
ICD-9-CM: 401.9  1/13/2013 Yes Pt was admitted for sob and suspected chf exacerbation, also leg cellilitis and OM,post surgery need long term iv abx Diabetic Foot Ulcer with some discharge from amputated toes site Drain and debridement per Dr. Minal Levine, 
 followed by Excision of ulcer, right foot. Transmetatarsal amputation, right foot. Full-thickness rotational flap, greater than 20 sq cm, right foot. Continue local wound care Wound cx mrsa Appreciated Dr. Rosen Single on board, need long term iv abx Will hold eliquis for tunnel catheter for abx Acute CHF (congestive heart failure) ([facility] (1/13/2021) No sob  echo with ef 40 %,  
Cardiologist on board Lymphedema (1/13/2021) No dvt elevated leg Improving Cellulitis Mild improving Continue vanc  
     
   
PAF 
eliquis hold, heparin gtt -restart  
  
laura- ckd3 Cr stable, stable now Continue to  hold lasix and losartan Continue monitoring cr  
 
DM type II Continue ssi Delirium resolved Subjective : I want to go home, Rn: stable PT recommend to go to snf, will have CM on board for placement Disposition :tbd, Review of systems: 
 
General: No fevers or chills. Cardiovascular: No chest pain or pressure. No palpitations. Pulmonary: No shortness of breath. Gastrointestinal: No nausea, vomiting. Vital signs/Intake and Output: 
Visit Vitals /66 (BP 1 Location: Right arm, BP Patient Position: At rest) Pulse 83 Temp 97.1 °F (36.2 °C) Resp 14 Ht 6' 0.01\" (1.829 m) Wt 99.7 kg (219 lb 11.2 oz) SpO2 100% BMI 29.79 kg/m² Current Shift:  No intake/output data recorded. Last three shifts:  01/20 1901 - 01/22 0700 In: 310 Out: 350 [Urine:350] Physical Exam: 
General: WD, WN. Alert, cooperative, no acute distress HEENT: NC, Atraumatic. PERRLA, anicteric sclerae. Lungs: CTA Bilaterally. No Wheezing/Rhonchi/Rales. Heart:  Regular  rhythm,  No murmur, No Rubs, No Gallops Abdomen: Soft, Non distended, Non tender. +Bowel sounds, Extremities: No c/c. Skin lesion noted on bilateral legs ,improving, rt foot covered wrapped with ace bandage Psych:   Not anxious or agitated. Neurologic:  No acute neurological deficit. Labs: Results:  
   
Chemistry Recent Labs  
  01/22/21 
0215 01/21/21 
0400 01/20/21 
0544 * 99 115*  145 144  
K 3.5 3.5 3.2*  
* 113* 112* CO2 21 24 23 BUN 68* 63* 71* CREA 2.62* 2.34* 2.47* CA 8.3* 7.8* 8.8 AGAP 10 8 9 BUCR 26* 27* 29* CBC w/Diff Recent Labs  
  01/22/21 0215 01/21/21 
0400 01/20/21 
0544 WBC 13.8* 14.0* 15.7*  
RBC 3.09* 2.50* 3.03* HGB 8.9* 7.2* 9.0*  
HCT 28.9* 23.7* 28.2*  
 330 279 GRANS 72 77* 75* LYMPH 15* 9* 11* EOS 4 4 5 Cardiac Enzymes No results for input(s): CPK, CKND1, DEQUAN in the last 72 hours. No lab exists for component: Kristin Pimentel Coagulation Recent Labs  
  01/21/21 
0400 01/20/21 
0545 APTT 36.2 115.6* Lipid Panel No results found for: CHOL, CHOLPOCT, CHOLX, CHLST, CHOLV, 401236, HDL, HDLP, LDL, LDLC, DLDLP, 642926, VLDLC, VLDL, TGLX, TRIGL, TRIGP, TGLPOCT, CHHD, CHHDX  
BNP No results for input(s): BNPP in the last 72 hours. Liver Enzymes No results for input(s): TP, ALB, TBIL, AP in the last 72 hours. No lab exists for component: SGOT, GPT, DBIL Thyroid Studies No results found for: T4, T3U, TSH, TSHEXT, TSHEXT Procedures/imaging: see electronic medical records for all procedures/Xrays and details which were not copied into this note but were reviewed prior to creation of Plan Mri Foot Rt Wo Cont Result Date: 1/14/2021 Procedure: MRI of the right forefoot without contrast. HISTORY: -From Provider: diabetic ulcer -Additional: Ulcer distal plantar right foot Comparisons: None Technique: Sagittal and coronal T1 and STIR; axial T1 and T2 with fat saturation sequences of the forefoot were obtained. Intravenous gadolinium was not requested or obtained. The study is motion degraded even with repeat attempt, which does limit sensitivity. Subtle diagnostic for clinical question. Findings: 2nd digit: There is heterogeneous soft tissue thickening surrounding the distal aspect of the head of the 2nd metatarsal, with surrounding foci of low signal intensity, suspicious for gas, largest dorsal lateral to the 2nd metatarsal head. There is adjacent heterogeneously T1 hypointense, T2/STIR hyperintense thickening in the subcutaneous fat, extending to the skin margin of the 2nd digit, at the site of suspected dressing. This may correspond to the site of the ulcer, but should be clinically correlated. There is diffuse fatty change in the intrinsic foot musculature, with focally heterogeneously hyperintense STIR signal in the soft tissues surrounding the 2nd metatarsal diaphysis. Indistinct cortical margin of the 2nd metatarsal head, subjacent patchy T1 hypointense STIR and T2 hyperintense marrow signal in the head and distal diaphysis of the 2nd metatarsal. 1st digit: Amputation of the phalanges distal to the 1st metatarsal head. Cartilage loss and subchondral marrow hyperintensity in the medial cuneiform subjacent to the 1st tarsal metatarsal joint and mild spurring at the 1st metatarsal-sesamoid joint. The 1st and 2nd phalanges are absent, suggesting prior amputation. There is thickening of the subcutaneous fat and skin at the plantar aspect of the 2nd and 3rd MTP joints. There is skin puckering at the plantar aspect of the foot between the 3rd and 4th MTP joints.  There is dorsiflexion at the MTP and plantarflexion at the DIP joints of the 3rd-5th digits. No acute associated marrow signal abnormalities are seen Impression: Images significant degraded by motion artifact. 1. Findings suspicious for osteomyelitis in the head and neck of the 2nd metatarsal, with surrounding cellulitis. Recommend correlation with clinical and laboratory findings. 2. Foci of low signal intensity in the soft tissues about the head of the 2nd metatarsal are suspicious for gas, and can be seen in the setting of gas forming infection or recent intervention. 3. Edema/myositis in the fatty replaced intrinsic foot musculature about the 2nd metatarsal 4. Plantar subcutaneous fat and skin thickening at the 1st and 2nd metatarsal heads, which can be related to cellulitis or chronic changes. Skin puckering at the plantar 3rd-4th metatarsal interspace is age indeterminate, and can reflect chronic or acute ulcerative etiology. 5. Presumed amputation of the 1st and 2nd phalanges distal to the metatarsal heads. Degenerative changes at the 1st tarsal metatarsal and 1st metatarsal sesamoid joint. Ct Head Wo Cont Result Date: 1/13/2021 EXAM: CT HEAD, WITHOUT IV CONTRAST INDICATION: Lethargy, headache, bilateral lower extremity swelling COMPARISON: None TECHNIQUE: Multiple axial CT images of the head were obtained extending from the skull base through the vertex. Additional coronal and sagittal reformations were also performed. One or more dose reduction techniques were used on this CT: automated exposure control, adjustment of the mAs and/or kVp according to patient size, and iterative reconstruction techniques. The specific techniques used on this CT exam have been documented in the patient's electronic medical record. Digital Imaging and Communications in Medicine (DICOM) format image data are available to nonaffiliated external healthcare facilities or entities on a secure, media free, reciprocally searchable basis with patient authorization for at least a 12-month period after this study. _______________ FINDINGS: BRAIN AND POSTERIOR FOSSA: There are a few small, probably chronic right and left basal ganglia lacunar infarcts. There is generalized prominence of the ventricular system, associated with proportional widening of the cortical cerebral sulci, compatible with generalized volume loss. Hazy hypoattenuation identified along the periventricular white matter, a nonspecific finding which is most commonly encountered in the setting of chronic microvascular disease. There is no intracranial hemorrhage, mass effect, or midline shift. There are no significant additional areas of abnormal parenchymal attenuation. EXTRA-AXIAL SPACES AND MENINGES: There are no abnormal extra-axial fluid collections. CALVARIUM: No acute osseous abnormality. OTHER: The visualized portions of the paranasal sinuses and mastoid air cells are clear. _______________ IMPRESSION: 1. No CT evidence of an acute intracranial abnormality - in particular, no acute cortical infarct, hemorrhage, or mass effect. 2.  Mild cerebral atrophy/volume loss and periventricular white matter changes, most commonly seen with chronic microvascular disease. Xr Chest [facility] Result Date: 1/13/2021 EXAM: XR CHEST PORT CLINICAL INDICATION/HISTORY: gen weakness -Additional: None COMPARISON: 1/8/2021 TECHNIQUE: Portable frontal view of the chest _______________ FINDINGS: SUPPORT DEVICES: None. HEART AND MEDIASTINUM: Cardiomediastinal silhouette within normal limits. LUNGS AND PLEURAL SPACES: Hypoinflated lungs. No dense consolidation, large effusion or pneumothorax. _______________ IMPRESSION: No acute cardiopulmonary abnormality. Xr Chest [facility] Result Date: 1/8/2021 EXAM: XR CHEST PORT CLINICAL INDICATION/HISTORY: Chest pain -Additional: None COMPARISON: 11/17/2014 TECHNIQUE: Portable frontal view of the chest _______________ FINDINGS: SUPPORT DEVICES: None. HEART AND MEDIASTINUM: Cardiomegaly. LUNGS AND PLEURAL SPACES: No dense consolidation, large effusion or pneumothorax. _______________ IMPRESSION: Cardiomegaly. No acute radiographic abnormality. Duplex Lower Ext Venous Bilat Result Date: 1/13/2021 · No evidence of deep vein thrombosis in the right lower extremity veins assessed. · No evidence of deep vein thrombosis in the left lower extremity veins assessed. Patency of mid to distal femoral vein bilaterally is done by the color flow and doppler signal only Duplex Lower Ext Artery Bilat Result Date: 1/14/2021 No evidence of any significant peripheral arterial disease in the bilateral lower extremity. Triphasic waveforms noted throughout.  
 
 
Buffy Cruz MD

## 2021-01-22 NOTE — PROGRESS NOTES
Problem: Mobility Impaired (Adult and Pediatric) Goal: *Acute Goals and Plan of Care (Insert Text) Description: Physical Therapy Goals Initiated 1/14/2021 and to be accomplished within 7 day(s) 1. Patient will move from supine <> sit with SBA in prep for out of bed activity and change of position. 2.  Patient will perform sit<> stand with CGA/min A/RW in prep for transfers/ambulation. 3.  Patient will transfer from bed <> chair with CGA/min A/RW for time up in chair for completion of ADL activity. 4.  Patient will ambulate 50 feet with CGA/MIN A/RW for improved functional mobility at discharge. 5.  Patient will ascend/descend 3-5 stairs with handrail(s) with minimal assistance/contact guard assist for home re-entry as needed. Physical Therapy Goals Updated 1/21/2021 and to be accomplished within 7 day(s) 1. Patient will move from supine <> sit with CGA in prep for out of bed activity and change of position. 2.  Patient will perform sit<> stand with min/modA/RW in prep for transfers/ambulation. 3.  Patient will transfer from bed <> chair with min/modA/RW for time up in chair for completion of ADL activity. 4.  Patient will ambulate 20 feet with MIN A/RW for improved functional mobility at discharge. Outcome: Progressing Towards Goal 
 
PHYSICAL THERAPY TREATMENT Patient: Carlos Frazire. (79 y.o. male) Date: 1/22/2021 Diagnosis: [facility] [I50.9] Lymphedema [I89.0] Cellulitis and abscess of leg, except foot Procedure(s) (LRB): 
TRANSMETATARSAL AMPUTATION RIGHT FOOT (PT IN ROOM #333) (Right) 2 Days Post-Op Precautions: Fall Chart, physical therapy assessment, plan of care and goals were reviewed.  
 
ASSESSMENT: 
 Pt is very verbal about wishes to transfer to bedside chair and also ambulate to restroom. Total assistance Is required to roll or transition to EOB. Pt reaching with arms and moving thoracic spine. Pt with poor/no movement at lumbar and LEs. Pt demonstrates poor LE strength at EOB and is not able to move against gravity for any movement. Mr. Burma Boast seems very confused and lacks insight into deficits. While trying to perform any activity, pt acknowledges inability, but still reverts to asking if he can get to chair or walk to bathroom. Mechanical lift is needed for safe exit of bed. Placement is recommended. Progression toward goals: 
[]      Improving appropriately and progressing toward goals [x]      Improving slowly and progressing toward goals 
[]      Not making progress toward goals and plan of care will be adjusted PLAN: 
Patient continues to benefit from skilled intervention to address the above impairments. Continue treatment per established plan of care. Discharge Recommendations:  Anup Stovall Further Equipment Recommendations for Discharge:  hospital bed, mechanical lift, and rolling walker SUBJECTIVE:  
Patient stated If I can get to this.  OBJECTIVE DATA SUMMARY:  
Critical Behavior: 
Neurologic State: Appropriate for age, Alert Orientation Level: Oriented X4 Cognition: Follows commands Safety/Judgement: Lack of insight into deficits, Decreased awareness of environment, Decreased awareness of need for assistance Functional Mobility Training: 
Bed Mobility: 
Rolling: Total assistance Supine to Sit: Total assistance Sit to Supine: Total assistance Scooting: Total assistance Balance: 
Sitting: Impaired Sitting - Static: Poor (constant support) Sitting - Dynamic: Poor (constant support) Pain: 
Pain in: 8 Bilateral LEs Pain out: 8 Activity Tolerance:  
Poor Please refer to the flowsheet for vital signs taken during this treatment. After treatment: [] Patient left in no apparent distress sitting up in chair 
[x] Patient left in no apparent distress in bed 
[x] Call bell left within reach [x] Nursing notified 
[] Caregiver present 
[] Bed alarm activated Frank Herman PTA Time Calculation: 47 mins

## 2021-01-22 NOTE — PROGRESS NOTES
Bedside and verbal shift change report given to Tristian Lerma, RN (on coming nurse) by Joseph Amor RN (off going nurse). Report included the following information SBAR, Kardex, OR Summary, Intake/Output and MAR. 
 
1512 Started Heparin drip. Shift summary: MD put hold on Eliquis for 2 days. Update on coming nurse. Noted no signs of distress. 1926 Bedside and verbal shift change report given by Tristian Lerma RN (off going nurse) to Anum Cuellar RN(on coming nurse). Report included the following information SBAR, Kardex, OR Summary, Intake/Output and MAR.

## 2021-01-23 NOTE — PROGRESS NOTES
Problem: Falls - Risk of 
Goal: *Absence of Falls Description: Document Addy Uriostegui Fall Risk and appropriate interventions in the flowsheet. 1/23/2021 1127 by Isis De Anda RN Outcome: Progressing Towards Goal 
Note: Fall Risk Interventions: 
Mobility Interventions: Communicate number of staff needed for ambulation/transfer Mentation Interventions: Adequate sleep, hydration, pain control Medication Interventions: Patient to call before getting OOB Elimination Interventions: Call light in reach History of Falls Interventions: Door open when patient unattended 1/23/2021 1051 by Issi De Anda RN Outcome: Progressing Towards Goal 
Note: Fall Risk Interventions: 
Mobility Interventions: Communicate number of staff needed for ambulation/transfer Mentation Interventions: Adequate sleep, hydration, pain control Medication Interventions: Patient to call before getting OOB Elimination Interventions: Call light in reach History of Falls Interventions: Door open when patient unattended Problem: Patient Education: Go to Patient Education Activity Goal: Patient/Family Education 1/23/2021 1127 by Isis De Anda RN Outcome: Progressing Towards Goal 
1/23/2021 1051 by Isis De Anda RN Outcome: Progressing Towards Goal 
  
Problem: Pressure Injury - Risk of 
Goal: *Prevention of pressure injury Description: Document Po Scale and appropriate interventions in the flowsheet. 1/23/2021 1127 by Isis De Anda RN Outcome: Progressing Towards Goal 
Note: Pressure Injury Interventions: 
Sensory Interventions: Assess changes in LOC Moisture Interventions: Absorbent underpads Activity Interventions: Pressure redistribution bed/mattress(bed type) Mobility Interventions: HOB 30 degrees or less Nutrition Interventions: Document food/fluid/supplement intake, Offer support with meals,snacks and hydration Friction and Shear Interventions: Apply protective barrier, creams and emollients 1/23/2021 1051 by June Ellis RN Outcome: Progressing Towards Goal 
Note: Pressure Injury Interventions: 
Sensory Interventions: Assess changes in LOC Moisture Interventions: Absorbent underpads Activity Interventions: Pressure redistribution bed/mattress(bed type) Mobility Interventions: HOB 30 degrees or less Nutrition Interventions: Document food/fluid/supplement intake, Offer support with meals,snacks and hydration Friction and Shear Interventions: Apply protective barrier, creams and emollients Problem: Patient Education: Go to Patient Education Activity Goal: Patient/Family Education 1/23/2021 1127 by June Ellis RN Outcome: Progressing Towards Goal 
1/23/2021 1051 by June Ellis RN Outcome: Progressing Towards Goal 
  
Problem: Patient Education: Go to Patient Education Activity Goal: Patient/Family Education 1/23/2021 1127 by June Ellis RN Outcome: Progressing Towards Goal 
1/23/2021 1051 by June Ellis RN Outcome: Progressing Towards Goal 
  
Problem: Cellulitis Care Plan (Adult) Goal: *Control of acute pain 1/23/2021 1127 by June Ellis RN Outcome: Progressing Towards Goal 
1/23/2021 1051 by June Ellis RN Outcome: Progressing Towards Goal 
Goal: *Skin integrity maintained 1/23/2021 1127 by June Ellis RN Outcome: Progressing Towards Goal 
1/23/2021 1051 by June Ellis RN Outcome: Progressing Towards Goal 
Goal: *Absence of infection signs and symptoms 1/23/2021 1127 by June Ellis RN Outcome: Progressing Towards Goal 
1/23/2021 1051 by June Ellis RN Outcome: Progressing Towards Goal 
  
Problem: Patient Education: Go to Patient Education Activity Goal: Patient/Family Education 1/23/2021 1127 by June Ellis RN Outcome: Progressing Towards Goal 
1/23/2021 1051 by June Ellis RN Outcome: Progressing Towards Goal 
  
Problem: Hypertension Goal: *Blood pressure within specified parameters 1/23/2021 1127 by Romi Nettles RN Outcome: Progressing Towards Goal 
1/23/2021 1051 by Romi Nettles RN Outcome: Progressing Towards Goal 
Goal: *Fluid volume balance 1/23/2021 1127 by Romi Nettles RN Outcome: Progressing Towards Goal 
1/23/2021 1051 by Romi Nettles RN Outcome: Progressing Towards Goal 
Goal: *Labs within defined limits 1/23/2021 1127 by Romi Nettles RN Outcome: Progressing Towards Goal 
1/23/2021 1051 by Romi Nettles RN Outcome: Progressing Towards Goal 
  
Problem: Patient Education: Go to Patient Education Activity Goal: Patient/Family Education 1/23/2021 1127 by Romi Nettles RN Outcome: Progressing Towards Goal 
1/23/2021 1051 by Romi Nettles RN Outcome: Progressing Towards Goal 
  
Problem: Surgical Pathway Day of Surgery Goal: Nutrition/Diet 1/23/2021 1127 by Romi Nettles RN Outcome: Progressing Towards Goal 
1/23/2021 1051 by Romi Nettles RN Outcome: Progressing Towards Goal 
Goal: Medications 1/23/2021 1127 by Romi Nettles RN Outcome: Progressing Towards Goal 
1/23/2021 1051 by Romi Nettles RN Outcome: Progressing Towards Goal 
Goal: Respiratory Outcome: Progressing Towards Goal

## 2021-01-23 NOTE — PROGRESS NOTES
Problem: Falls - Risk of 
Goal: *Absence of Falls Description: Document Jennifer Blood Fall Risk and appropriate interventions in the flowsheet. Outcome: Progressing Towards Goal 
Note: Fall Risk Interventions: 
Mobility Interventions: Communicate number of staff needed for ambulation/transfer Mentation Interventions: Adequate sleep, hydration, pain control, Door open when patient unattended Medication Interventions: Patient to call before getting OOB Elimination Interventions: Call light in reach, Patient to call for help with toileting needs History of Falls Interventions: Door open when patient unattended, Investigate reason for fall Problem: Patient Education: Go to Patient Education Activity Goal: Patient/Family Education Outcome: Progressing Towards Goal 
  
Problem: Pressure Injury - Risk of 
Goal: *Prevention of pressure injury Description: Document Po Scale and appropriate interventions in the flowsheet. Outcome: Progressing Towards Goal 
Note: Pressure Injury Interventions: 
Sensory Interventions: Assess changes in LOC, Check visual cues for pain Moisture Interventions: Absorbent underpads, Limit adult briefs Activity Interventions: Pressure redistribution bed/mattress(bed type) Mobility Interventions: HOB 30 degrees or less, Pressure redistribution bed/mattress (bed type) Nutrition Interventions: Document food/fluid/supplement intake Friction and Shear Interventions: Apply protective barrier, creams and emollients, Lift team/patient mobility team, Minimize layers Problem: Patient Education: Go to Patient Education Activity Goal: Patient/Family Education Outcome: Progressing Towards Goal 
  
Problem: Patient Education: Go to Patient Education Activity Goal: Patient/Family Education Outcome: Progressing Towards Goal 
  
Problem: Cellulitis Care Plan (Adult) Goal: *Control of acute pain Outcome: Progressing Towards Goal 
 Goal: *Skin integrity maintained Outcome: Progressing Towards Goal 
Goal: *Absence of infection signs and symptoms Outcome: Progressing Towards Goal 
  
Problem: Patient Education: Go to Patient Education Activity Goal: Patient/Family Education Outcome: Progressing Towards Goal 
  
Problem: Hypertension Goal: *Blood pressure within specified parameters Outcome: Progressing Towards Goal 
Goal: *Fluid volume balance Outcome: Progressing Towards Goal 
Goal: *Labs within defined limits Outcome: Progressing Towards Goal 
  
Problem: Patient Education: Go to Patient Education Activity Goal: Patient/Family Education Outcome: Progressing Towards Goal 
  
Problem: Risk for Spread of Infection Goal: Prevent transmission of infectious organism to others Description: Prevent the transmission of infectious organisms to other patients, staff members, and visitors. Outcome: Progressing Towards Goal 
  
Problem: Patient Education:  Go to Education Activity Goal: Patient/Family Education Outcome: Progressing Towards Goal

## 2021-01-23 NOTE — PROGRESS NOTES
1808 [facility] care of the patient from [address] [facility] [address] (offgoing Nurse). Patient is alert and oriented. Heparin drip verified with offgoing nurse. Pt denies any pain or discomfort at this moment. bed in low position, call bell within reach. 1925 Bedside and Verbal shift change report given to Rojelio Toney (oncoming nurse) by Erich Severin RN (offgoing nurse). Report included the following information SBAR, Kardex, MAR, Recent Results and Cardiac Rhythm .

## 2021-01-23 NOTE — PROGRESS NOTES
Vancomycin random level 21.7 @ 0145 on 01/23/2021 No Vancomycin ordered for today Order random level

## 2021-01-23 NOTE — ROUTINE PROCESS
Bedside and Verbal shift change report given to HEYDI Kingston RN (oncoming nurse) by RITIKA Escalante RN (offgoing nurse). Report included the following information SBAR, Kardex, Intake/Output, MAR and Recent Results. Introduced self to pt, updated whiteboard. Verified heparin gtt with RITIKA Escalante RN. NAD noted. Call bell within reach. 2229- aPTT 119.1, decreased heparin gtt to 16units/kg/hr. Next aPTT due at 0400.  
 
0019- Pt c/o generalized pain 3/10, requesting PRN tylenol. PRN tylenol given. 5604- Pain resolved, pt is resting quietly in room with chest rising and falling. Bedside and Verbal shift change report given to HUMBERTO Dodson RN (oncoming nurse) by Francoise Healy. Krista Kingston RN (offgoing nurse). Report included the following information SBAR, Kardex, Intake/Output, MAR and Recent Results.

## 2021-01-23 NOTE — PROGRESS NOTES
Problem: Mobility Impaired (Adult and Pediatric) Goal: *Acute Goals and Plan of Care (Insert Text) Description: Physical Therapy Goals Initiated 1/14/2021 and to be accomplished within 7 day(s) 1. Patient will move from supine <> sit with SBA in prep for out of bed activity and change of position. 2.  Patient will perform sit<> stand with CGA/min A/RW in prep for transfers/ambulation. 3.  Patient will transfer from bed <> chair with CGA/min A/RW for time up in chair for completion of ADL activity. 4.  Patient will ambulate 50 feet with CGA/MIN A/RW for improved functional mobility at discharge. 5.  Patient will ascend/descend 3-5 stairs with handrail(s) with minimal assistance/contact guard assist for home re-entry as needed. Physical Therapy Goals Updated 1/21/2021 and to be accomplished within 7 day(s) 1. Patient will move from supine <> sit with CGA in prep for out of bed activity and change of position. 2.  Patient will perform sit<> stand with min/modA/RW in prep for transfers/ambulation. 3.  Patient will transfer from bed <> chair with min/modA/RW for time up in chair for completion of ADL activity. 4.  Patient will ambulate 20 feet with MIN A/RW for improved functional mobility at discharge. Outcome: Not Progressing Towards Goal 
 PHYSICAL THERAPY TREATMENT Patient: Denver Hue. (79 y.o. male) Date: 1/23/2021 Diagnosis: [facility] [I50.9] Lymphedema [I89.0] Cellulitis and abscess of leg, except foot Procedure(s) (LRB): 
TRANSMETATARSAL AMPUTATION RIGHT FOOT (PT IN ROOM #333) (Right) 3 Days Post-Op Precautions: Fall PLOF:  
 
ASSESSMENT: 
 Pt is not progressing. Will move UEs but no attempt to moves trunk or LEs and is resistant with passive movement. Gown and multiple bed pads are saturated in urine, gown changed. Attempted rolling 4 times with no luck. Adjusted pt into middle of the bed 3x but pt continues to go back to leaning against the R rail and diagonal in bed. (B)LEs and genitals are very edematous, LEs painful to any slight movement and touch. Utilized [place] bed to slide pt up to [facility]. Unsuccessful in getting pt cleaned up. Notified CNA. Progression toward goals:  
[]      Improving appropriately and progressing toward goals 
[]      Improving slowly and progressing toward goals [x]      Not making progress toward goals and plan of care will be adjusted PLAN: 
Patient continues to benefit from skilled intervention to address the above impairments. Continue treatment per established plan of care. Discharge Recommendations:  LTC, hospice? Further Equipment Recommendations for Discharge:  hospital bed and mechanical lift SUBJECTIVE:  
Patient stated I need the urinal. (2x during session) OBJECTIVE DATA SUMMARY:  
Critical Behavior: 
Neurologic State: Appropriate for age Orientation Level: Oriented X4 Cognition: Follows commands Safety/Judgement: Lack of insight into deficits, Decreased awareness of environment, Decreased awareness of need for assistance Pain: 
Pain level pre-treatment: 0/10 Pain level post-treatment: 10/10 (when touched) Pain Intervention(s): Medication (see MAR); Rest, Ice, Repositioning Response to intervention: Nurse notified, See doc flow Activity Tolerance:  
none Please refer to the flowsheet for vital signs taken during this treatment. After treatment:  
[] Patient left in no apparent distress sitting up in chair 
[x] Patient left in no apparent distress in bed 
[x] Call bell left within reach [x] CNA notified 
[] Caregiver present 
[] Bed alarm activated 
[] SCDs applied COMMUNICATION/EDUCATION:  
[x]         Role of Physical Therapy in the acute care setting. []         Fall prevention education was provided and the patient/caregiver indicated understanding. []         Patient/family have participated as able in working toward goals and plan of care. []         Patient/family agree to work toward stated goals and plan of care. []         Patient understands intent and goals of therapy, but is neutral about his/her participation. [x]         Patient is unable to participate in stated goals/plan of care: ongoing with therapy staff. 
[]         Other: 
 
   
Ada Rai PTA Time Calculation: 29 mins

## 2021-01-24 NOTE — PROGRESS NOTES
Hospitalist Progress Note 
 
Patient: Abhya Shelby Jr. MRN: [id number]  CSN: [id number]   
YOB: 1940  Age: 80 y.o.  Sex: male   
DOA: 1/13/2021 LOS:  LOS: 11 days     
   
 
 
 
Assessment/Plan  
 
Patient Active Problem List  
Diagnosis Code  
• Cellulitis and abscess of leg, except foot L03.119, L02.419  
• HTN (hypertension) I10  
• Glaucoma H40.9  
• Unspecified sleep apnea G47.30  
• ARF (acute renal failure) ([facility]) N17.9  
• Chronic venous insufficiency I87.2  
• Cellulitis of leg, right L03.115  
• Lymphedema I89.0  
• Acute CHF (congestive heart failure) ([facility]) I50.9  
• Hypokalemia E87.6  
• Amputation of right great toe ([facility]) S98.111A  
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  
• Stage 3 chronic kidney disease N18.30  
• Chronic osteomyelitis of right foot ([facility]) M86.671  
• MRSA infection A49.02  
  
 
 
 
79 yo male admitted for sob and chf exacerbation, also leg cellilitis and OM,a/p surgery need long term iv abx  
  
  
Diabetic Foot Ulcer with some discharge from amputated toes site  
Drain and debridement per Dr. Dumont, 
Excision of ulcer, right foot. 
Transmetatarsal amputation, right foot. 
Full-thickness rotational flap,  right foot. 
Continue local wound care 
Wound cx mrsa  
Appreciated Dr. Haro on board, need long term iv abx  
Will hold eliquis for tunnel catheter for abx  
  
Acute CHF   
No sob  echo with ef 40 %,  
Lasix on hold  
  
Severe Lymphedema 
No dvt elevated leg  
Lasix on hold 
  
Cellulitis  
Mild improving  
     
   
PAF 
eliquis hold, heparin gtt -  
  
laura- ckd3 
Cr stable, stable now   
Continue to  hold lasix and losartan  
Continue monitoring cr  
  
DM type II  
Continue ssi  
  
Delirium resolved  
 
Disposition : awaiting placement 
 
Review of systems 
General: No fevers or chills. 
Cardiovascular: No chest pain or pressure. No palpitations.  
Pulmonary: No shortness of breath.  
 Gastrointestinal: No nausea, vomiting. Physical Exam: 
General: Awake, cooperative, no acute distress   
HEENT: NC, Atraumatic. PERRLA, anicteric sclerae. Lungs: CTA Bilaterally. No Wheezing/Rhonchi/Rales. Heart:  S1 S2,  No murmur, No Rubs, No Gallops Abdomen: Soft, Non distended, Non tender.  +Bowel sounds, Extremities: Bilateral severe edema, right foot with dressing. Psych:   Not anxious or agitated. Neurologic:  No acute neurological deficit. Vital signs/Intake and Output: 
Visit Vitals BP (!) 152/64 Pulse 81 Temp 97.4 °F (36.3 °C) Resp 16 Ht 6' 0.01\" (1.829 m) Wt 99.8 kg (220 lb) SpO2 100% BMI 29.83 kg/m² Current Shift:  01/24 0701 - 01/24 1900 In: -  
Out: 200 [Urine:200] Last three shifts:  01/22 1901 - 01/24 0700 In: 480 [P.O.:480] Out: 1030 [Urine:980] Labs: Results:  
   
Chemistry Recent Labs  
  01/24/21 
0205 01/23/21 
0145 01/22/21 
0215 * 96 106*  142 144  
K 3.3* 3.4* 3.5  112* 113* CO2 22 23 21 BUN 69* 69* 68* CREA 2.54* 2.75* 2.62* CA 7.9* 8.6 8.3* AGAP 9 7 10 BUCR 27* 25* 26* CBC w/Diff Recent Labs  
  01/24/21 
0205 01/23/21 
0145 01/22/21 
1400 WBC 12.6 13.6* 13.4*  
RBC 2.84* 3.04* 3.17* HGB 8.3* 8.9* 9.4* HCT 26.8* 28.8* 29.9*  
 289 327 GRANS 84* 75* 78* LYMPH 10* 10* 9* EOS 0 4 3 Cardiac Enzymes No results for input(s): CPK, CKND1, DEQUAN in the last 72 hours. No lab exists for component: Carlotta Homans Coagulation Recent Labs  
  01/24/21 
0205 01/23/21 
1700 APTT 61.1* 157.9* Lipid Panel No results found for: CHOL, CHOLPOCT, CHOLX, CHLST, CHOLV, 431206, HDL, HDLP, LDL, LDLC, DLDLP, 182053, VLDLC, VLDL, TGLX, TRIGL, TRIGP, TGLPOCT, CHHD, CHHDX  
BNP No results for input(s): BNPP in the last 72 hours. Liver Enzymes No results for input(s): TP, ALB, TBIL, AP in the last 72 hours.  
 
No lab exists for component: SGOT, GPT, DBIL  
 Thyroid Studies No results found for: T4, T3U, TSH, TSHEXT, TSHEXT Procedures/imaging: see electronic medical records for all procedures/Xrays and details which were not copied into this note but were reviewed prior to creation of Plan

## 2021-01-24 NOTE — PROGRESS NOTES
Problem: Falls - Risk of 
Goal: *Absence of Falls Description: Document [place] Ce[place] Fall Risk and appropriate interventions in the flowsheet. Outcome: Progressing Towards Goal 
Note: Fall Risk Interventions: 
Mobility Interventions: Communicate number of staff needed for ambulation/transfer Mentation Interventions: Adequate sleep, hydration, pain control Medication Interventions: Patient to call before getting OOB Elimination Interventions: Call light in reach History of Falls Interventions: Door open when patient unattended Problem: Patient Education: Go to Patient Education Activity Goal: Patient/Family Education Outcome: Progressing Towards Goal 
  
Problem: Pressure Injury - Risk of 
Goal: *Prevention of pressure injury Description: Document Po Scale and appropriate interventions in the flowsheet. Outcome: Progressing Towards Goal 
Note: Pressure Injury Interventions: 
Sensory Interventions: Assess changes in LOC Moisture Interventions: Absorbent underpads Activity Interventions: Pressure redistribution bed/mattress(bed type) Mobility Interventions: HOB 30 degrees or less Nutrition Interventions: Document food/fluid/supplement intake, Offer support with meals,snacks and hydration Friction and Shear Interventions: Apply protective barrier, creams and emollients Problem: Patient Education: Go to Patient Education Activity Goal: Patient/Family Education Outcome: Progressing Towards Goal 
  
Problem: Patient Education: Go to Patient Education Activity Goal: Patient/Family Education Outcome: Progressing Towards Goal 
  
Problem: Cellulitis Care Plan (Adult) Goal: *Control of acute pain Outcome: Progressing Towards Goal 
Goal: *Skin integrity maintained Outcome: Progressing Towards Goal 
Goal: *Absence of infection signs and symptoms Outcome: Progressing Towards Goal 
  
Problem: Patient Education: Go to Patient Education Activity Goal: Patient/Family Education Outcome: Progressing Towards Goal 
  
Problem: Hypertension Goal: *Blood pressure within specified parameters Outcome: Progressing Towards Goal 
Goal: *Fluid volume balance Outcome: Progressing Towards Goal 
Goal: *Labs within defined limits Outcome: Progressing Towards Goal 
  
Problem: Patient Education: Go to Patient Education Activity Goal: Patient/Family Education Outcome: Progressing Towards Goal 
  
Problem: Surgical Pathway Day of Surgery Goal: Nutrition/Diet Outcome: Progressing Towards Goal 
Goal: Medications Outcome: Progressing Towards Goal 
Goal: Respiratory Outcome: Progressing Towards Goal 
  
Problem: Risk for Spread of Infection Goal: Prevent transmission of infectious organism to others Description: Prevent the transmission of infectious organisms to other patients, staff members, and visitors. Outcome: Progressing Towards Goal 
  
Problem: Patient Education:  Go to Education Activity Goal: Patient/Family Education Outcome: Progressing Towards Goal

## 2021-01-24 NOTE — ROUTINE PROCESS
Bedside and Verbal shift change report given to Zayra Borrero RN (oncoming nurse) by Corinne Nielsen RN (offgoing nurse). Report included the following information SBAR, Kardex, Intake/Output, MAR, Recent Results, and Cardiac Rhythm afib.

## 2021-01-24 NOTE — PROGRESS NOTES
[id number]: The pt rested well overnight with no complaints. No new clinical issues. Night shift chart check completed

## 2021-01-24 NOTE — PROGRESS NOTES
Problem: Falls - Risk of 
Goal: *Absence of Falls Description: Document   Fall Risk and appropriate interventions in the flowsheet. Outcome: Progressing Towards Goal 
Note: Fall Risk Interventions: 
Mobility Interventions: Communicate number of staff needed for ambulation/transfer Mentation Interventions: Adequate sleep, hydration, pain control Medication Interventions: Patient to call before getting OOB Elimination Interventions: Call light in reach History of Falls Interventions: Door open when patient unattended Problem: Patient Education: Go to Patient Education Activity Goal: Patient/Family Education Outcome: Progressing Towards Goal 
  
Problem: Pressure Injury - Risk of 
Goal: *Prevention of pressure injury Description: Document Po Scale and appropriate interventions in the flowsheet. Outcome: Progressing Towards Goal 
Note: Pressure Injury Interventions: 
Sensory Interventions: Assess changes in LOC Moisture Interventions: Absorbent underpads Activity Interventions: Pressure redistribution bed/mattress(bed type) Mobility Interventions: HOB 30 degrees or less Nutrition Interventions: Document food/fluid/supplement intake, Offer support with meals,snacks and hydration Friction and Shear Interventions: Apply protective barrier, creams and emollients Problem: Patient Education: Go to Patient Education Activity Goal: Patient/Family Education Outcome: Progressing Towards Goal 
  
Problem: Patient Education: Go to Patient Education Activity Goal: Patient/Family Education Outcome: Progressing Towards Goal 
  
Problem: Cellulitis Care Plan (Adult) Goal: *Control of acute pain Outcome: Progressing Towards Goal 
Goal: *Skin integrity maintained Outcome: Progressing Towards Goal 
Goal: *Absence of infection signs and symptoms Outcome: Progressing Towards Goal 
  
Problem: Patient Education: Go to Patient Education Activity Goal: Patient/Family Education Outcome: Progressing Towards Goal 
  
Problem: Hypertension Goal: *Blood pressure within specified parameters Outcome: Progressing Towards Goal 
Goal: *Labs within defined limits Outcome: Progressing Towards Goal 
  
Problem: Patient Education: Go to Patient Education Activity Goal: Patient/Family Education Outcome: Progressing Towards Goal 
  
Problem: Patient Education:  Go to Education Activity Goal: Patient/Family Education Outcome: Progressing Towards Goal

## 2021-01-24 NOTE — PROGRESS NOTES
Vancomycin random level = 15.9 @ 0205 on 01/24/2021    vancomycin 750 mg IV x1 at 1000 on 01/24/2021

## 2021-01-24 NOTE — PROGRESS NOTES
[facility] from [address] (offgoing Nurse). Patient is alert and oriented. Pt denies any pain or discomfort at this moment. bed in low position, call bell within reach. 1921 Bedside and Verbal shift change report given to [address] [place] [address] (oncoming nurse) by Carmen Mayfield RN (offgoing nurse). Report included the following information SBAR, Kardex, MAR, Recent Results and Cardiac Rhythm .

## 2021-01-24 NOTE — PROGRESS NOTES
Problem: Mobility Impaired (Adult and Pediatric) Goal: *Acute Goals and Plan of Care (Insert Text) Description: Physical Therapy Goals Initiated 1/14/2021 and to be accomplished within 7 day(s) 1. Patient will move from supine <> sit with SBA in prep for out of bed activity and change of position. 2.  Patient will perform sit<> stand with CGA/min A/RW in prep for transfers/ambulation. 3.  Patient will transfer from bed <> chair with CGA/min A/RW for time up in chair for completion of ADL activity. 4.  Patient will ambulate 50 feet with CGA/MIN A/RW for improved functional mobility at discharge. 5.  Patient will ascend/descend 3-5 stairs with handrail(s) with minimal assistance/contact guard assist for home re-entry as needed. Physical Therapy Goals Updated 1/21/2021 and to be accomplished within 7 day(s) 1. Patient will move from supine <> sit with CGA in prep for out of bed activity and change of position. 2.  Patient will perform sit<> stand with min/modA/RW in prep for transfers/ambulation. 3.  Patient will transfer from bed <> chair with min/modA/RW for time up in chair for completion of ADL activity. 4.  Patient will ambulate 20 feet with MIN A/RW for improved functional mobility at discharge. Note: PHYSICAL THERAPY TREATMENT Patient: Denver Hue. (79 y.o. male) Date: 1/24/2021 Diagnosis: [facility] [I50.9] Lymphedema [I89.0] Cellulitis and abscess of leg, except foot Procedure(s) (LRB): 
TRANSMETATARSAL AMPUTATION RIGHT FOOT (PT IN ROOM #333) (Right) 4 Days Post-Op Precautions: Fall PLOF: pt reports using rollator PTA.  
 
ASSESSMENT: 
 Pt with significantly decr insight into deficits. Pt asking this PT when he will go home. Discussed with that PT is recommending SNF, and need for modifications/equipment for home if pt wishes to return home. When asked how the pt would get up the stairs to enter his home, pt responded that his son-in-law would carry him. Pt attempted to perform supine  > sit by moving LEs off bed then pushing up to sit. Pt unable to move LEs off the bed at this time. Pt then admitting that he was weaker and more deconditioned than he realized, but still insisting he would be able to function at home. Provided pt with therapeutic exercises for LE strength and ROM as described below. Pt requesting that this PT provide update to his wife on his status and current recommendations. Discussed the above on the phone with pt and his wife. Pt is not safe to return home at this time, pt would need hospital bed and mechanical lift in order to safely return home. Pt appears to be more motivated to participate in therapy, requesting to attempt to sit EOB tomorrow. Will attempt another session and determine if pt is benefiting from skilled physical therapy. Progression toward goals:  
[]      Improving appropriately and progressing toward goals [x]      Improving slowly and progressing toward goals 
[]      Not making progress toward goals and plan of care will be adjusted PLAN: 
Patient continues to benefit from skilled intervention to address the above impairments. Continue treatment per established plan of care. Discharge Recommendations:  Anup Stovall Further Equipment Recommendations for Discharge:  mechanical lift, hospital bed SUBJECTIVE:  
Patient stated I want to go home.  OBJECTIVE DATA SUMMARY:  
Critical Behavior: 
Neurologic State: Appropriate for age Orientation Level: Oriented X4 Cognition: Follows commands Safety/Judgement: Lack of insight into deficits, Decreased awareness of environment, Decreased awareness of need for assistance Functional Mobility Training: 
Bed Mobility: 
Supine to Sit: (pt unable to perform) Therapeutic Exercises:  
 
 
EXERCISE Sets Reps Active Active Assist  
Passive Self ROM Comments Ankle Pumps 1 10  [x] [] [] [] Quad Sets/Glut Sets 1 10  [x] [] [] [] Hold for 3 secs Pain: 
Pain level pre-treatment: 0/10 Pain level post-treatment: 0/10 Pain Intervention(s): Medication (see MAR); Rest, Ice, Repositioning Response to intervention: Nurse notified, See doc flow Activity Tolerance: Pt with decr tolerance, unable to mobilize to EOB at this time, performed therapeutic exercise, but pt with decr AROM available in LEs. Please refer to the flowsheet for vital signs taken during this treatment. After treatment:  
[] Patient left in no apparent distress sitting up in chair 
[x] Patient left in no apparent distress in bed 
[x] Call bell left within reach [x] Nursing notified 
[] Caregiver present 
[] Bed alarm activated 
[] SCDs applied COMMUNICATION/EDUCATION:  
[x]         Role of Physical Therapy in the acute care setting. [x]         Fall prevention education was provided and the patient/caregiver indicated understanding. [x]         Patient/family have participated as able in working toward goals and plan of care. [x]         Patient/family agree to work toward stated goals and plan of care. []         Patient understands intent and goals of therapy, but is neutral about his/her participation. []         Patient is unable to participate in stated goals/plan of care: ongoing with therapy staff. 
[]         Other: Alfie Gasca Time Calculation: 65 mins

## 2021-01-24 NOTE — PROGRESS NOTES
Hospitalist Progress Note Patient: Naif Burden MRN: [id number]  CSN: [id number] YOB: 1940  Age: 79 y.o. Sex: male DOA: 1/13/2021 LOS:  LOS: 10 days Assessment/Plan Patient Active Problem List  
Diagnosis Code  Cellulitis and abscess of leg, except foot L03.119, L02.419  
 HTN (hypertension) I10  
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  
  
 
 
 
78 yo male admitted for sob and chf exacerbation, also leg cellilitis and OM,a/p surgery need long term iv abx  
  
  
Diabetic Foot Ulcer with some discharge from amputated toes site Drain and debridement per Dr. Rozina Thakur, Excision of ulcer, right foot. Transmetatarsal amputation, right foot. Full-thickness rotational flap,  right foot. Continue local wound care Wound cx mrsa Appreciated Dr. Ashlyn Resendez on board, need long term iv abx Will hold eliquis for tunnel catheter for abx  
  
Acute CHF No sob  echo with ef 40 %,  
Cardiologist on board  
  
Severe Lymphedema No dvt elevated leg Improving  
  
Cellulitis Mild improving  
     
   
PAF 
eliquis hold, heparin gtt -  
  
laura- ckd3 Cr stable, stable now Continue to  hold lasix and losartan Continue monitoring cr  
  
DM type II Continue ssi  
  
Delirium resolved Disposition : awaiting placement Review of systems General: No fevers or chills. Cardiovascular: No chest pain or pressure. No palpitations. Pulmonary: No shortness of breath. Gastrointestinal: No nausea, vomiting. Physical Exam: 
General: Awake, cooperative, no acute distress   
HEENT: NC, Atraumatic. PERRLA, anicteric sclerae. Lungs: CTA Bilaterally. No Wheezing/Rhonchi/Rales. Heart:  S1 S2,  No murmur, No Rubs, No Gallops Abdomen: Soft, Non distended, Non tender.  +Bowel sounds, Extremities: Bilateral severe edema, right foot with dressing. Psych:   Not anxious or agitated. Neurologic:  No acute neurological deficit. Vital signs/Intake and Output: 
Visit Vitals BP (!) 149/62 Pulse 84 Temp 97.2 °F (36.2 °C) Resp 16 Ht 6' 0.01\" (1.829 m) Wt 99.8 kg (220 lb) SpO2 100% BMI 29.83 kg/m² Current Shift:  No intake/output data recorded. Last three shifts:  01/22 0701 - 01/23 1900 In: 480 [P.O.:480] Out: 500 [Urine:500] Labs: Results:  
   
Chemistry Recent Labs  
  01/23/21 
0145 01/22/21 
0215 01/21/21 
0400 GLU 96 106* 99  144 145  
K 3.4* 3.5 3.5 * 113* 113* CO2 23 21 24 BUN 69* 68* 63* CREA 2.75* 2.62* 2.34* CA 8.6 8.3* 7.8* AGAP 7 10 8 BUCR 25* 26* 27* CBC w/Diff Recent Labs  
  01/23/21 
0145 01/22/21 
1400 01/22/21 
0215 WBC 13.6* 13.4* 13.8*  
RBC 3.04* 3.17* 3.09* HGB 8.9* 9.4* 8.9* HCT 28.8* 29.9* 28.9*  
 327 319 GRANS 75* 78* 72 LYMPH 10* 9* 15* EOS 4 3 4 Cardiac Enzymes No results for input(s): CPK, CKND1, DEQUAN in the last 72 hours. No lab exists for component: Luzma Perez Coagulation Recent Labs  
  01/23/21 
1700 01/23/21 
1351 APTT 157.9* 140.0* Lipid Panel No results found for: CHOL, CHOLPOCT, CHOLX, CHLST, CHOLV, 649800, HDL, HDLP, LDL, LDLC, DLDLP, 130564, VLDLC, VLDL, TGLX, TRIGL, TRIGP, TGLPOCT, CHHD, CHHDX  
BNP No results for input(s): BNPP in the last 72 hours. Liver Enzymes No results for input(s): TP, ALB, TBIL, AP in the last 72 hours.  
 
No lab exists for component: SGOT, GPT, DBIL  
 Thyroid Studies No results found for: T4, T3U, TSH, TSHEXT Procedures/imaging: see electronic medical records for all procedures/Xrays and details which were not copied into this note but were reviewed prior to creation of Plan

## 2021-01-25 NOTE — PROGRESS NOTES
Spoke with RN about TNL CVC request. Starling Folk will be placed tomorrow due to IR availability. Please stop heparin drip tomorrow morning and hold all other anticoagulants prior to procedure.

## 2021-01-25 NOTE — PROGRESS NOTES
Chart reviewed plan is for PICC placement for long term abx therapy, at this time [facility] has been [facility] to [facility] , spouse requesting [facility], referral placed at this time.

## 2021-01-25 NOTE — CONSULTS
[facility] 
(A Division of [address] [place] [address]) Follow-up Note Date of Admission: 1/13/2021       Date of Note:  1/25/2021 Summary: Mr Galen Senior is an 79y WM with underlying CKD/lymphedema who presented 1/13 with several week h/o progressive R foot infection for which he went to OR 1/15 for initial I&D and then more complete TMA 1/20 with skin flap closure of initial wound. Bleh. Only MRSA has grown. He has been on vanco + pip/tzb since admission. Kidneys a little worse. He feels fine today. Talkative. 
  
Retired  with extensive [address] travel DOS - 1/15 and 1/20 CC:  \"I'm ready to get out of here\" Interval History: 
Weekend events reviewed/tracked from [facility]. Awaiting the tunneled line (tomorrow) then should be ready to go to [facility]. Current Antimicrobials:    Prior Antimicrobials: 1. Vanco IV (1/13-) #12 1. Pip/tzb IV (1/13-22) Assessment: Rec / Plan:  
Extensive R foot MRSA OM/infection deep - DOS 1/20 1/25:  CRP 114mg/L Teed up. Out tomorrow on the balance of his IV vancomycin, and then I'll likely put on life-long suppressive MRSA therapy ->POD #5 Vancomycin at current dosing (in flux) Termination date - [id number] Weekly labs (qMondays) - CBC with diff, BMP, vanco level for PHARM to dose, and quantitative CRP. I can see in [address] [place] [address] in 4-5wks CKD stage IV/III   
DMT2 good control A1c 6% Chronic Lymphedema HTN Microbiology:                1/15 - OR (+) MRSA 
                                       1/13 - BCx x 2 (-) 
  
  
Lines / Catheters:         peripherals Patient Active Problem List  
Diagnosis Code  Cellulitis and abscess of leg, except foot L03.119, L02.419  
 HTN (hypertension) I10  
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  
 
 
Current Facility-Administered Medications Medication Dose Route Frequency  Vancomycin Held Today- trough = 17.9 mcg/ml  1 Each Other ONCE  
 heparin (porcine) 25,000 units in 0.45% saline 250 ml infusion  18-36 Units/kg/hr IntraVENous TITRATE  
 0.9% sodium chloride infusion 250 mL  250 mL IntraVENous PRN  
 HYDROmorphone (PF) (DILAUDID) injection 1 mg  1 mg IntraMUSCular Q4H PRN  
 0.9% sodium chloride infusion 250 mL  250 mL IntraVENous PRN  
 hydrALAZINE (APRESOLINE) tablet 25 mg  25 mg Oral Q8H PRN  
 amLODIPine (NORVASC) tablet 5 mg  5 mg Oral DAILY  [Held by provider] furosemide (LASIX) tablet 20 mg  20 mg Oral DAILY  oxyCODONE-acetaminophen (PERCOCET) 5-325 mg per tablet 1 Tab  1 Tab Oral Q4H PRN  
 latanoprost (XALATAN) 0.005 % ophthalmic solution 1 Drop  1 Drop Both Eyes QPM  
 insulin lispro (HUMALOG) injection   SubCUTAneous AC&HS  
 glucose chewable tablet 16 g  4 Tab Oral PRN  
 glucagon (GLUCAGEN) injection 1 mg  1 mg IntraMUSCular PRN  
 dextrose 10% infusion 125-250 mL  125-250 mL IntraVENous PRN  
 sodium chloride (NS) flush 5-10 mL  5-10 mL IntraVENous PRN  
 [Held by provider] apixaban (ELIQUIS) tablet 2.5 mg  2.5 mg Oral BID  atenoloL (TENORMIN) tablet 50 mg  50 mg Oral QPM  
 potassium chloride (KLOR-CON) tablet 10 mEq  10 mEq Oral BID  sodium chloride (NS) flush 5-40 mL  5-40 mL IntraVENous Q8H  
 sodium chloride (NS) flush 5-40 mL  5-40 mL IntraVENous PRN  
 acetaminophen (TYLENOL) tablet 650 mg  650 mg Oral Q6H PRN  Or  
 acetaminophen (TYLENOL) suppository 650 mg  650 mg Rectal Q6H PRN  
  polyethylene glycol (MIRALAX) packet 17 g  17 g Oral DAILY PRN  promethazine (PHENERGAN) tablet 12.5 mg  12.5 mg Oral Q6H PRN Or  
 ondansetron (ZOFRAN) injection 4 mg  4 mg IntraVENous Q6H PRN  Vancomycin - Pharmacy to Dose  1 Each Other Rx Dosing/Monitoring Review of Systems - General ROS: negative for - chills, fever or night sweats Respiratory ROS: no cough, shortness of breath, or wheezing Cardiovascular ROS: no chest pain or dyspnea on exertion Gastrointestinal ROS: no abdominal pain, change in bowel habits, or black or bloody stools Objective: 
 
Visit Vitals /60 Pulse 86 Temp 97.3 °F (36.3 °C) Resp 14 Ht 6' 0.01\" (1.829 m) Wt 103.4 kg (228 lb) SpO2 98% BMI 30.92 kg/m² Temp (24hrs), Av.4 °F (36.3 °C), Min:97.3 °F (36.3 °C), Max:97.4 °F (36.3 °C) GEN: WDWN WM in NAD HEENT: anicteric CHEST: CTA CVS:RRR 
ABD: NT 
EXT: lymphedematous L>R; R foot wrapped Lab results: 
 
Chemistry Recent Labs  
  21 
0500 21 
0205 21 
0145 * 105* 96  142 142  
K 3.6 3.3* 3.4*  
* 111 112* CO2 23 22 23 BUN 66* 69* 69* CREA 2.44* 2.54* 2.75* CA 7.8* 7.9* 8.6 AGAP 8 9 7 BUCR 27* 27* 25* CBC w/ Diff Recent Labs  
  21 
0500 21 
0205 21 
0145 WBC 12.2 12.6 13.6*  
RBC 3.04* 2.84* 3.04* HGB 9.0* 8.3* 8.9* HCT 28.8* 26.8* 28.8*  
 294 289 GRANS 76* 84* 75* LYMPH 9* 10* 10* EOS 3 0 4 Microbiology All Micro Results Procedure Component Value Units Date/Time CULTURE, BLOOD [[id number]] Collected: 21 2100 Order Status: Completed Specimen: Blood Updated: 21 Special Requests: NO SPECIAL REQUESTS Culture result: NO GROWTH 6 DAYS     
 CULTURE, BLOOD [[id number]] Collected: 210 Order Status: Completed Specimen: Blood Updated: 21 Special Requests: NO SPECIAL REQUESTS Culture result: NO GROWTH 6 DAYS     
 CULTURE, Serena Specking STAIN [[id number]]  (Abnormal)  (Susceptibility) Collected: 01/15/21 1520 Order Status: Completed Specimen: Wound Drainage Updated: 01/18/21 1003 Special Requests: NO SPECIAL REQUESTS     
  GRAM STAIN OCCASIONAL WBCS SEEN     
   NO ORGANISMS SEEN Culture result: FEW * METHICILLIN RESISTANT STAPHYLOCOCCUS AUREUS *  
     
 CULTURE, ANAEROBIC [[id number]] Collected: 01/15/21 1520 Order Status: Completed Specimen: Foot, Right Updated: 01/17/21 1432 Special Requests: NO SPECIAL REQUESTS Culture result: NO ANAEROBES ISOLATED Declan Bai MD 
Cell [phone] Jericho Cifuentes [facility] 1/25/2021  
12:31 PM

## 2021-01-25 NOTE — PROGRESS NOTES
. 
 
Patient Name: Waylon Thacker. Age: 79 y.o. Sex:  male YOB: 1940 Medical Record Number: [id number] Antimicrobial  Current Antimicrobial Therapy (168h ago, onward) Ordered     Start Stop  
 01/15/21 0852  piperacillin-tazobactam (ZOSYN) 2.25 g in 0.9% sodium chloride (MBP/ADV) 50 mL MBP  2.25 g,   IntraVENous,   EVERY 6 HOURS    
 01/15/21 1300 01/20/21 1859 Indication SSTI Last Level (if applicable) Lab Results Component Value Date/Time Reported dose: 1000 MG 01/20/2021 05:45 AM  
 Reported dose time: 7032 01/20/2021 05:45 AM  
 Reported dose date [id number] 01/20/2021 05:45 AM  
 
Vancomycin Lab Results Component Value Date/Time Vancomycin,trough 27.3 ([facility]) 01/20/2021 05:45 AM  
 Vancomycin, random 17.9 01/25/2021 09:00 AM  
  
Gentamicin No results found for: GENP, GENT, GENR] Tobramycin No results found for: TOBP, TOBT, TOBR Amikacin No results found for: [place], AMIKT, [facility] [place] Cultures (7 most recent) GRAM STAIN Date Value Ref Range Status 01/15/2021 OCCASIONAL WBCS SEEN   Final  
01/15/2021 NO ORGANISMS SEEN   Final  
 
Culture result:  
Date Value Ref Range Status 01/15/2021 NO ANAEROBES ISOLATED   Final  
01/15/2021 (A)   Final  
 FEW * METHICILLIN RESISTANT STAPHYLOCOCCUS AUREUS *  
01/13/2021 NO GROWTH 6 DAYS   Final  
01/13/2021 NO GROWTH 6 DAYS   Final  
09/20/2017 NO GROWTH 6 DAYS   Final  
11/20/2014 NO GROWTH 1 DAY   Final  
11/17/2014 NO GROWTH 6 DAYS   Final  
11/17/2014 NO GROWTH 6 DAYS   Final  
  
Renal Overview (72 hr) Recent Labs  
  01/25/21 
0500 01/24/21 
0205 01/23/21 
0145 BUN 66* 69* 69* CREA 2.44* 2.54* 2.75* CrCl (Current): Serum creatinine: 2.44 mg/dL (H) 01/25/21 0500 Estimated creatinine clearance: 29.5 mL/min (A) Lactic Acid (Sepsis Criteria: >2.0 mmol/L) Lab Results Component Value Date/Time Lactic acid 1.8 11/16/2014 04:28 PM  
  
Procalcitonin No results found for: PCT Suspected Sepsis:  
<0.50 ng/mL Low likelihood of sepsis. 0.50-2.00 ng/mL Increased likelihood of sepsis. Antibioticsencouraged. >2.00 ng/mL High risk of sepsis/shock. Antibiotics strongly encouraged. Suspected Lower Resp Tract Infections:  
<0.24 ng/mL Low likelihood of bacterial infection. >0.24 ng/mL Increased likelihood of bacterial infection. Antibiotics encouraged. Hepatic Overview (72 hr) No results for input(s): ALT, AP in the last 72 hours. No lab exists for component: SGOT Temp (24-hr Max) Temp (24hrs), Av.4 °F (36.3 °C), Min:97.3 °F (36.3 °C), Max:97.4 °F (36.3 °C) Hematology Recent Labs  
  21 
0500 21 
0205 21 
0145 WBC 12.2 12.6 13.6* HGB 9.0* 8.3* 8.9* HCT 28.8* 26.8* 28.8*  
 294 289 GRANS 76* 84* 75* ANEU 9.4* 10.6* 10.3*  
 
  
DOT  12 Notes CrCl= 29.5 ml/min WBC= 12.2 Goal trough = 10-15 mcg/ml trough: trough resulted at 17.9 mcg/ml on 21 at 1035 Plan will hold dose today RAUL Monsalve SALOMÓN [facility] Clinical Pharmacist 
[phone]

## 2021-01-25 NOTE — PROGRESS NOTES
Problem: Falls - Risk of 
Goal: *Absence of Falls Description: Document Griselda Liriano Fall Risk and appropriate interventions in the flowsheet. Outcome: Progressing Towards Goal 
Note: Fall Risk Interventions: 
Mobility Interventions: Bed/chair exit alarm, Patient to call before getting OOB, Communicate number of staff needed for ambulation/transfer Mentation Interventions: Adequate sleep, hydration, pain control, Bed/chair exit alarm, Toileting rounds Medication Interventions: Teach patient to arise slowly, Patient to call before getting OOB, Bed/chair exit alarm Elimination Interventions: Patient to call for help with toileting needs, Call light in reach History of Falls Interventions: Consult care management for discharge planning, Investigate reason for fall, Room close to nurse's station, Bed/chair exit alarm Problem: Patient Education: Go to Patient Education Activity Goal: Patient/Family Education Outcome: Progressing Towards Goal 
  
Problem: Pressure Injury - Risk of 
Goal: *Prevention of pressure injury Description: Document Po Scale and appropriate interventions in the flowsheet. Outcome: Progressing Towards Goal 
Note: Pressure Injury Interventions: 
Sensory Interventions: Assess changes in LOC, Pressure redistribution bed/mattress (bed type), Monitor skin under medical devices, Keep linens dry and wrinkle-free Moisture Interventions: Minimize layers, Absorbent underpads Activity Interventions: Pressure redistribution bed/mattress(bed type) Mobility Interventions: Pressure redistribution bed/mattress (bed type), PT/OT evaluation Nutrition Interventions: Document food/fluid/supplement intake, Offer support with meals,snacks and hydration Friction and Shear Interventions: Minimize layers Problem: Patient Education: Go to Patient Education Activity Goal: Patient/Family Education Outcome: Progressing Towards Goal 
  
 Problem: Patient Education: Go to Patient Education Activity Goal: Patient/Family Education Outcome: Progressing Towards Goal 
  
Problem: Cellulitis Care Plan (Adult) Goal: *Control of acute pain Outcome: Progressing Towards Goal 
Goal: *Skin integrity maintained Outcome: Progressing Towards Goal 
Goal: *Absence of infection signs and symptoms Outcome: Progressing Towards Goal 
  
Problem: Patient Education: Go to Patient Education Activity Goal: Patient/Family Education Outcome: Progressing Towards Goal 
  
Problem: Hypertension Goal: *Blood pressure within specified parameters Outcome: Progressing Towards Goal 
Goal: *Fluid volume balance Outcome: Progressing Towards Goal 
Goal: *Labs within defined limits Outcome: Progressing Towards Goal 
  
Problem: Patient Education: Go to Patient Education Activity Goal: Patient/Family Education Outcome: Progressing Towards Goal 
  
Problem: Surgical Pathway Day of Surgery Goal: Nutrition/Diet Outcome: Progressing Towards Goal 
Goal: Medications Outcome: Progressing Towards Goal 
Goal: Respiratory Outcome: Progressing Towards Goal 
  
Problem: Risk for Spread of Infection Goal: Prevent transmission of infectious organism to others Description: Prevent the transmission of infectious organisms to other patients, staff members, and visitors. Outcome: Progressing Towards Goal 
  
Problem: Patient Education:  Go to Education Activity Goal: Patient/Family Education Outcome: Progressing Towards Goal

## 2021-01-25 NOTE — PROGRESS NOTES
0100 
Bedside and Verbal shift change report given to Sendy Feliciano RN (oncoming nurse) by Clau Vyas RN (offgoing nurse). Report included the following information SBAR, Kardex, ED Summary, Intake/Output, MAR, Recent Results, Med Rec Status and Cardiac Rhythm Afib; BBB. Shift assessment complete Pt resting quietly with eyes closed and chest rising and falling evenly 8015 Reassessment complete Pt resting quietly with eyes closed and chest rising and falling evenly 3 [place] Cardiac/Medical Night Shift Chart Audit Chart Audit completed?  YES

## 2021-01-25 NOTE — PROGRESS NOTES
Problem: Falls - Risk of 
Goal: *Absence of Falls Description: Document Sarai Chou Fall Risk and appropriate interventions in the flowsheet. Outcome: Progressing Towards Goal 
Note: Fall Risk Interventions: 
Mobility Interventions: Assess mobility with egress test, Bed/chair exit alarm, Communicate number of staff needed for ambulation/transfer, Patient to call before getting OOB Mentation Interventions: Adequate sleep, hydration, pain control, Door open when patient unattended Medication Interventions: Teach patient to arise slowly, Patient to call before getting OOB Elimination Interventions: Call light in reach, Patient to call for help with toileting needs, Urinal in reach History of Falls Interventions: Door open when patient unattended Problem: Patient Education: Go to Patient Education Activity Goal: Patient/Family Education Outcome: Progressing Towards Goal 
  
Problem: Pressure Injury - Risk of 
Goal: *Prevention of pressure injury Description: Document Po Scale and appropriate interventions in the flowsheet. Outcome: Progressing Towards Goal 
Note: Pressure Injury Interventions: 
Sensory Interventions: Assess changes in LOC, Avoid rigorous massage over bony prominences, Discuss PT/OT consult with provider, Float heels, Keep linens dry and wrinkle-free, Maintain/enhance activity level, Minimize linen layers, Monitor skin under medical devices, Pad between skin to skin, Pressure redistribution bed/mattress (bed type) Moisture Interventions: Absorbent underpads, Limit adult briefs Activity Interventions: Pressure redistribution bed/mattress(bed type), Increase time out of bed, PT/OT evaluation Mobility Interventions: HOB 30 degrees or less, Pressure redistribution bed/mattress (bed type), PT/OT evaluation Nutrition Interventions: Document food/fluid/supplement intake Friction and Shear Interventions: HOB 30 degrees or less, Lift sheet, Lift team/patient mobility team, Minimize layers Problem: Patient Education: Go to Patient Education Activity Goal: Patient/Family Education Outcome: Progressing Towards Goal 
  
Problem: Patient Education: Go to Patient Education Activity Goal: Patient/Family Education Outcome: Progressing Towards Goal 
  
Problem: Cellulitis Care Plan (Adult) Goal: *Control of acute pain Outcome: Progressing Towards Goal 
Goal: *Skin integrity maintained Outcome: Progressing Towards Goal 
Goal: *Absence of infection signs and symptoms Outcome: Progressing Towards Goal 
  
Problem: Patient Education: Go to Patient Education Activity Goal: Patient/Family Education Outcome: Progressing Towards Goal 
  
Problem: Hypertension Goal: *Blood pressure within specified parameters Outcome: Progressing Towards Goal 
Goal: *Fluid volume balance Outcome: Progressing Towards Goal 
Goal: *Labs within defined limits Outcome: Progressing Towards Goal 
  
Problem: Patient Education: Go to Patient Education Activity Goal: Patient/Family Education Outcome: Progressing Towards Goal 
  
Problem: Surgical Pathway Day of Surgery Goal: Nutrition/Diet Outcome: Progressing Towards Goal 
Goal: Medications Outcome: Progressing Towards Goal 
Goal: Respiratory Outcome: Progressing Towards Goal 
  
Problem: Risk for Spread of Infection Goal: Prevent transmission of infectious organism to others Description: Prevent the transmission of infectious organisms to other patients, staff members, and visitors. Outcome: Progressing Towards Goal 
  
Problem: Patient Education:  Go to Education Activity Goal: Patient/Family Education Outcome: Progressing Towards Goal

## 2021-01-25 NOTE — PROGRESS NOTES
Hospitalist Progress Note-critical care note Patient: Joyce Orr. MRN: [id number]  [facility]: [id number] YOB: 1940  Age: 79 y.o. Sex: male DOA: 1/13/2021 LOS:  LOS: 12 days Chief complaint: Hypokalemia, toe amputated, diabetes foot ulcer DM CHF cellulitis Assessment/Plan Hospital Problems  Date Reviewed: 1/13/2021 Codes Class Noted POA Chronic osteomyelitis of right foot ([facility] ICD-10-CM: L29.432 ICD-9-CM: 730.17  1/22/2021 Unknown MRSA infection ICD-10-CM: A49.02 
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 
ICD-9-CM: 401.9  1/13/2013 Yes Pt was admitted for sob and suspected chf exacerbation, also leg cellilitis and OM,post surgery need long term iv abx Diabetic Foot Ulcer with some discharge from amputated toes site Need  Line for long term abx, hope can get it today Drain and debridement per Dr. Mine Otto, 
 followed by Excision of ulcer, right foot. Transmetatarsal amputation, right foot. Full-thickness rotational flap, greater than 20 sq cm, right foot. Continue local wound care Wound cx mrsa Acute CHF (congestive heart failure) ([facility] (1/13/2021) No sob  echo with ef 40 %,  
Cardiologist on board Lymphedema (1/13/2021) No dvt elevated leg Improving Cellulitis Mild improving Continue vanc  
     
   
PAF 
eliquis hold, heparin gtt -restart  
  
laura- ckd3 Cr stable, stable now Continue to  hold lasix and losartan Continue monitoring cr  
 
DM type II Continue ssi Subjective : I had a good night Rn: stable Disposition :1-2 days Review of systems: 
 
General: No fevers or chills. Cardiovascular: No chest pain or pressure. No palpitations. Pulmonary: No shortness of breath. Gastrointestinal: No nausea, vomiting. Vital signs/Intake and Output: 
Visit Vitals /60 Pulse 86 Temp 97.3 °F (36.3 °C) Resp 14 Ht 6' 0.01\" (1.829 m) Wt 103.4 kg (228 lb) SpO2 98% BMI 30.92 kg/m² Current Shift:  01/25 0701 - 01/25 1900 In: 680 [P.O.:680] Out: 150 [Urine:150] Last three shifts:  01/23 1901 - 01/25 0700 In: 720 [P.O.:720] Out: 1180 [TCTZO:2689] Physical Exam: 
General: WD, WN. Alert, cooperative, no acute distress HEENT: NC, Atraumatic. PERRLA, anicteric sclerae. Lungs: CTA Bilaterally. No Wheezing/Rhonchi/Rales. Heart:  Regular  rhythm,  No murmur, No Rubs, No Gallops Abdomen: Soft, Non distended, Non tender. +Bowel sounds, Extremities: No c/c. Skin lesion noted on bilateral legs ,improving, rt foot covered wrapped with ace bandage Psych:   Not anxious or agitated. Neurologic:  No acute neurological deficit. Labs: Results:  
   
Chemistry Recent Labs  
  01/25/21 
0500 01/24/21 
0205 01/23/21 
0145 * 105* 96  142 142  
K 3.6 3.3* 3.4*  
* 111 112* CO2 23 22 23 BUN 66* 69* 69* CREA 2.44* 2.54* 2.75* CA 7.8* 7.9* 8.6 AGAP 8 9 7 BUCR 27* 27* 25* CBC w/Diff Recent Labs  
  01/25/21 
0500 01/24/21 
0205 01/23/21 
0145 WBC 12.2 12.6 13.6*  
RBC 3.04* 2.84* 3.04* HGB 9.0* 8.3* 8.9* HCT 28.8* 26.8* 28.8*  
 294 521 GRANS 76* 84* 75* LYMPH 9* 10* 10* EOS 3 0 4 Cardiac Enzymes No results for input(s): CPK, CKND1, DEQUAN in the last 72 hours. No lab exists for component: Kenny Pacheco Coagulation Recent Labs  
  01/25/21 
0900 01/24/21 2017 APTT >180.0* 46.0* Lipid Panel No results found for: CHOL, CHOLPOCT, CHOLX, CHLST, CHOLV, [id number], HDL, HDLP, LDL, LDLC, DLDLP, 490364, VLDLC, VLDL, TGLX, TRIGL, TRIGP, TGLPOCT, CHHD, CHHDX  
BNP No results for input(s): BNPP in the last 72 hours. Liver Enzymes No results for input(s): TP, ALB, TBIL, AP in the last 72 hours. No lab exists for component: SGOT, GPT, DBIL Thyroid Studies No results found for: T4, T3U, TSH, TSHEXT, TSHEXT Procedures/imaging: see electronic medical records for all procedures/Xrays and details which were not copied into this note but were reviewed prior to creation of Plan Mri Foot Rt Wo Cont Result Date: 1/14/2021 Procedure: MRI of the right forefoot without contrast. HISTORY: -From Provider: diabetic ulcer -Additional: Ulcer distal plantar right foot Comparisons: None Technique: Sagittal and coronal T1 and STIR; axial T1 and T2 with fat saturation sequences of the forefoot were obtained. Intravenous gadolinium was not requested or obtained. The study is motion degraded even with repeat attempt, which does limit sensitivity. Subtle diagnostic for clinical question. Findings: 2nd digit: There is heterogeneous soft tissue thickening surrounding the distal aspect of the head of the 2nd metatarsal, with surrounding foci of low signal intensity, suspicious for gas, largest dorsal lateral to the 2nd metatarsal head. There is adjacent heterogeneously T1 hypointense, T2/STIR hyperintense thickening in the subcutaneous fat, extending to the skin margin of the 2nd digit, at the site of suspected dressing. This may correspond to the site of the ulcer, but should be clinically correlated. There is diffuse fatty change in the intrinsic foot musculature, with focally heterogeneously hyperintense STIR signal in the soft tissues surrounding the 2nd metatarsal diaphysis. Indistinct cortical margin of the 2nd metatarsal head, subjacent patchy T1 hypointense STIR and T2 hyperintense marrow signal in the head and distal diaphysis of the 2nd metatarsal. 1st digit: Amputation of the phalanges distal to the 1st metatarsal head. Cartilage loss and subchondral marrow hyperintensity in the medial cuneiform subjacent to the 1st tarsal metatarsal joint and mild spurring at the 1st metatarsal-sesamoid joint. The 1st and 2nd phalanges are absent, suggesting prior amputation. There is thickening of the subcutaneous fat and skin at the plantar aspect of the 2nd and 3rd MTP joints. There is skin puckering at the plantar aspect of the foot between the 3rd and 4th MTP joints.  There is dorsiflexion at the MTP and plantarflexion at the DIP joints of the 3rd-5th digits. No acute associated marrow signal abnormalities are seen Impression: Images significant degraded by motion artifact. 1. Findings suspicious for osteomyelitis in the head and neck of the 2nd metatarsal, with surrounding cellulitis. Recommend correlation with clinical and laboratory findings. 2. Foci of low signal intensity in the soft tissues about the head of the 2nd metatarsal are suspicious for gas, and can be seen in the setting of gas forming infection or recent intervention. 3. Edema/myositis in the fatty replaced intrinsic foot musculature about the 2nd metatarsal 4. Plantar subcutaneous fat and skin thickening at the 1st and 2nd metatarsal heads, which can be related to cellulitis or chronic changes. Skin puckering at the plantar 3rd-4th metatarsal interspace is age indeterminate, and can reflect chronic or acute ulcerative etiology. 5. Presumed amputation of the 1st and 2nd phalanges distal to the metatarsal heads. Degenerative changes at the 1st tarsal metatarsal and 1st metatarsal sesamoid joint. Ct Head Wo Cont Result Date: 1/13/2021 EXAM: CT HEAD, WITHOUT IV CONTRAST INDICATION: Lethargy, headache, bilateral lower extremity swelling COMPARISON: None TECHNIQUE: Multiple axial CT images of the head were obtained extending from the skull base through the vertex. Additional coronal and sagittal reformations were also performed. One or more dose reduction techniques were used on this CT: automated exposure control, adjustment of the mAs and/or kVp according to patient size, and iterative reconstruction techniques. The specific techniques used on this CT exam have been documented in the patient's electronic medical record. Digital Imaging and Communications in Medicine (DICOM) format image data are available to nonaffiliated external healthcare facilities or entities on a secure, media free, reciprocally searchable basis with patient authorization for at least a 12-month period after this study. _______________ FINDINGS: BRAIN AND POSTERIOR FOSSA: There are a few small, probably chronic right and left basal ganglia lacunar infarcts. There is generalized prominence of the ventricular system, associated with proportional widening of the cortical cerebral sulci, compatible with generalized volume loss. Hazy hypoattenuation identified along the periventricular white matter, a nonspecific finding which is most commonly encountered in the setting of chronic microvascular disease. There is no intracranial hemorrhage, mass effect, or midline shift. There are no significant additional areas of abnormal parenchymal attenuation. EXTRA-AXIAL SPACES AND MENINGES: There are no abnormal extra-axial fluid collections. CALVARIUM: No acute osseous abnormality. OTHER: The visualized portions of the paranasal sinuses and mastoid air cells are clear. _______________ IMPRESSION: 1. No CT evidence of an acute intracranial abnormality - in particular, no acute cortical infarct, hemorrhage, or mass effect. 2.  Mild cerebral atrophy/volume loss and periventricular white matter changes, most commonly seen with chronic microvascular disease. Xr Chest [facility] Result Date: 1/13/2021 EXAM: XR CHEST PORT CLINICAL INDICATION/HISTORY: gen weakness -Additional: None COMPARISON: 1/8/2021 TECHNIQUE: Portable frontal view of the chest _______________ FINDINGS: SUPPORT DEVICES: None. HEART AND MEDIASTINUM: Cardiomediastinal silhouette within normal limits. LUNGS AND PLEURAL SPACES: Hypoinflated lungs. No dense consolidation, large effusion or pneumothorax. _______________ IMPRESSION: No acute cardiopulmonary abnormality. Xr Chest [facility] Result Date: 1/8/2021 EXAM: XR CHEST PORT CLINICAL INDICATION/HISTORY: Chest pain -Additional: None COMPARISON: 11/17/2014 TECHNIQUE: Portable frontal view of the chest _______________ FINDINGS: SUPPORT DEVICES: None. HEART AND MEDIASTINUM: Cardiomegaly. LUNGS AND PLEURAL SPACES: No dense consolidation, large effusion or pneumothorax. _______________ IMPRESSION: Cardiomegaly. No acute radiographic abnormality. Duplex Lower Ext Venous Bilat Result Date: 1/13/2021 · No evidence of deep vein thrombosis in the right lower extremity veins assessed. · No evidence of deep vein thrombosis in the left lower extremity veins assessed. Patency of mid to distal femoral vein bilaterally is done by the color flow and doppler signal only Duplex Lower Ext Artery Bilat Result Date: 1/14/2021 No evidence of any significant peripheral arterial disease in the bilateral lower extremity. Triphasic waveforms noted throughout.  
 
 
Valorie Sevilla MD

## 2021-01-26 NOTE — DISCHARGE SUMMARY
Discharge Summary Patient: Joseph Ivan MRN: [id number]  CSN: [id number] YOB: 1940  Age: 79 y.o. Sex: male DOA: 1/13/2021 LOS:  LOS: 13 days   Discharge Date:   
 
Primary Care Provider:  Kalen Cantrell MD 
 
Admission Diagnoses: Acute CHF (congestive heart failure) ([facility] [I50.9] Lymphedema [I89.0] Discharge Diagnoses:   
Hospital Problems  Date Reviewed: 1/13/2021 Codes Class Noted POA Chronic osteomyelitis of right foot ([facility] ICD-10-CM: B60.468 ICD-9-CM: 730.17  1/22/2021 Unknown MRSA infection ICD-10-CM: A49.02 
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 
ICD-9-CM: 401.9  1/13/2013 Yes Discharge Condition: stable Discharge Medications:    
Current Discharge Medication List  
  
START taking these medications Details  
amLODIPine (NORVASC) 5 mg tablet Take 1 Tab by mouth daily. Qty: 30 Tab, Refills: 0  
  
vancomycin 750 mg 750 mg 750 mg by IntraVENous route every seventy-two (72) hours. Qty: 1 Dose, Refills: 0 Comments: Please check random vanc level before administration vancomycin. Goal vanc level goal 10-15 ug/ml, last random level on Jan 25th was 17.9 CONTINUE these medications which have NOT CHANGED Details  
apixaban (ELIQUIS) 2.5 mg tablet Take 2.5 mg by mouth two (2) times a day. acetaminophen (TYLENOL) 325 mg tablet Take 3 Tabs by mouth every six (6) hours as needed for Pain. Qty: 20 Tab, Refills: 0  
  
loratadine (CLARITIN) 10 mg tablet Take 10 mg by mouth daily. melatonin 5 mg cap capsule Take 5 mg by mouth as needed. atenolol (TENORMIN) 25 mg tablet Take 50 mg by mouth every evening. bimatoprost (LUMIGAN) 0.03 % ophthalmic drops Administer 1 drop to both eyes every evening. niacin (NIASPAN) 1,000 mg Tb24 tab Take 1,000 mg by mouth nightly. multivitamin (ONE A DAY) tablet Take 1 Tab by mouth daily. calcium-cholecalciferol, d3, (CALCIUM 600 + D) 600-125 mg-unit Tab Take  by mouth. STOP taking these medications  
  
 traMADoL (Ultram) 50 mg tablet Comments:  
Reason for Stopping:   
   
 acetaminophen/diphenhydramine (TYLENOL PM PO) Comments:  
Reason for Stopping:   
   
 trimethoprim-sulfamethoxazole (BACTRIM DS, SEPTRA DS) 160-800 mg per tablet Comments:  
Reason for Stopping:   
   
 cephALEXin (KEFLEX) 500 mg capsule Comments:  
Reason for Stopping:   
   
 telmisartan-hydrochlorothiazide (MICARDIS HCT) 80-25 mg per tablet Comments:  
Reason for Stopping:   
   
 potassium chloride SR (KLOR-CON 10) 10 mEq tablet Comments:  
Reason for Stopping:   
   
 furosemide (LASIX) 20 mg tablet Comments:  
Reason for Stopping:   
   
  
 
 
Procedures : tunneled central line See below Consults: Infectious Disease podiatry PHYSICAL EXAM  
Visit Vitals /64 Pulse 76 Temp 97.9 °F (36.6 °C) Resp 16 Ht 6' 0.01\" (1.829 m) Wt 105.7 kg (233 lb) SpO2 99% BMI 31.59 kg/m² General: Awake, cooperative, no acute distress HEENT: NC, Atraumatic. PERRLA, EOMI. Anicteric sclerae. Lungs:  CTA Bilaterally. No Wheezing/Rhonchi/Rales. Heart:  Regular  rhythm,  No murmur, No Rubs, No Gallops Abdomen: Soft, Non distended, Non tender. +Bowel sounds, Extremities: No c/c, rt foot wrapped with ace bandage Psych:   Not anxious or agitated. Neurologic:  No acute neurological deficits. Admission HPI : 
Anshul Avalos is a 79 y.o.  male who has hx of Afib, HTN,, presents with worsening lower extremity edema and swelling progressive over the last week; patient has been trying escalating doses of lasix with no improvement of swelling; The day of admission patient realized he could not go up stairs; He has a arranged to get a stair lift/ 
Patient came to ER few days ago with chest pain X-rays and labs were stable he did sustain a fall and developed  Bruised ribs In the ER he was given 80mg IV lasix with rapid diuresis but persistent edema and worsening Nilesh Patient also with asymptomatic ulcer on opposite foot Asked to admit for further tx Hospital Course : 
 
Pt was admitted for sob and suspected chf exacerbation, also leg cellilitis and OM,post surgery need long term iv abx  
   
Diabetic Foot Ulcer and foot OM Iv abx was started since admission. Podiatrist was consulted and dr. Latonia Mcclure did I&D and debridement, and followed by Excision of ulcer, right foot,Transmetatarsal amputation, right foot, Full-thickness rotational flap, greater than 20 sq cm, right foot. He tolerated the procedures very well. ID was consulted and wound cx indicated MRSA. Vancomycin was administrated per pharmacy dose. He was given 750 mg vancomycin Q72 hr, last random level was 17.5, his goal 10-15. Case discussed with pharmacist recommend to check random vanc tomorrow before administrated. ID recommended : Vancomycin at current dosing (in flux), Termination date - [id number]. Weekly labs (qMondays) - CBC with diff, BMP, vanco level for PHARM to dose, and quantitative CRP. Tunneled catheter was placed for iv access.  
  
Acute CHF (congestive heart failure) ([facility] (1/13/2021) No sob  echo with ef 40 %, lasix was hold due to renal function . he did receive iv lasix for chf, his sob resolved  
 
  
Lymphedema (1/13/2021) No dvt elevated leg , received albumin and Improving  
      
   
PAF Stable and will continue eliquis  for AC and rate controlled bbb. His ckd 3 has been stable and DM type II was controlled per sliding scale  
 
  
 
Discharge planning discussed with patient, pt agrees  with the plan and no questions and concerns at this point. Activity: Activity as tolerated Diet: Cardiac Diet and Diabetic Diet Follow-up: dr. Maggi Sinha and Dr. Floresita Dominguez Disposition: snf  
 
Minutes spent on discharge: 45 min Labs: Results:  
   
Chemistry Recent Labs  
  01/26/21 
0256 01/25/21 
0500 01/24/21 
0205 GLU 96 116* 105*  145 142  
K 3.5 3.6 3.3*  
* 114* 111 CO2 23 23 22 BUN 70* 66* 69* CREA 2.55* 2.44* 2.54* CA 7.9* 7.8* 7.9* AGAP 8 8 9 BUCR 27* 27* 27* CBC w/Diff Recent Labs  
  01/25/21 
0500 01/24/21 
0205 WBC 12.2 12.6 RBC 3.04* 2.84* HGB 9.0* 8.3* HCT 28.8* 26.8*  
 294 GRANS 76* 84* LYMPH 9* 10* EOS 3 0 Cardiac Enzymes No results for input(s): CPK, CKND1, DEQUAN in the last 72 hours. No lab exists for component: [place] Coagulation Recent Labs  
  01/26/21 
0256 01/25/21 
1800 PTP 16.0*  --   
INR 1.3*  --   
APTT 71.3* 85.4* Lipid Panel No results found for: CHOL, CHOLPOCT, CHOLX, CHLST, CHOLV, 435565, HDL, HDLP, LDL, LDLC, DLDLP, 593995, VLDLC, VLDL, TGLX, TRIGL, TRIGP, TGLPOCT, CHHD, CHHDX  
BNP No results for input(s): BNPP in the last 72 hours. Liver Enzymes No results for input(s): TP, ALB, TBIL, AP in the last 72 hours. No lab exists for component: SGOT, GPT, DBIL Thyroid Studies No results found for: T4, T3U, TSH, TSHEXT    
 
@micro Significant Diagnostic Studies: Mri Foot Rt Wo Cont Result Date: 1/14/2021 Procedure: MRI of the right forefoot without contrast. HISTORY: -From Provider: diabetic ulcer -Additional: Ulcer distal plantar right foot Comparisons: None Technique: Sagittal and coronal T1 and STIR; axial T1 and T2 with fat saturation sequences of the forefoot were obtained. Intravenous gadolinium was not requested or obtained. The study is motion degraded even with repeat attempt, which does limit sensitivity. Subtle diagnostic for clinical question. Findings: 2nd digit: There is heterogeneous soft tissue thickening surrounding the distal aspect of the head of the 2nd metatarsal, with surrounding foci of low signal intensity, suspicious for gas, largest dorsal lateral to the 2nd metatarsal head. There is adjacent heterogeneously T1 hypointense, T2/STIR hyperintense thickening in the subcutaneous fat, extending to the skin margin of the 2nd digit, at the site of suspected dressing. This may correspond to the site of the ulcer, but should be clinically correlated. There is diffuse fatty change in the intrinsic foot musculature, with focally heterogeneously hyperintense STIR signal in the soft tissues surrounding the 2nd metatarsal diaphysis. Indistinct cortical margin of the 2nd metatarsal head, subjacent patchy T1 hypointense STIR and T2 hyperintense marrow signal in the head and distal diaphysis of the 2nd metatarsal. 1st digit: Amputation of the phalanges distal to the 1st metatarsal head. Cartilage loss and subchondral marrow hyperintensity in the medial cuneiform subjacent to the 1st tarsal metatarsal joint and mild spurring at the 1st metatarsal-sesamoid joint. The 1st and 2nd phalanges are absent, suggesting prior amputation. There is thickening of the subcutaneous fat and skin at the plantar aspect of the 2nd and 3rd MTP joints. There is skin puckering at the plantar aspect of the foot between the 3rd and 4th MTP joints.  There is dorsiflexion at the MTP and plantarflexion at the DIP joints of the 3rd-5th digits. No acute associated marrow signal abnormalities are seen Impression: Images significant degraded by motion artifact. 1. Findings suspicious for osteomyelitis in the head and neck of the 2nd metatarsal, with surrounding cellulitis. Recommend correlation with clinical and laboratory findings. 2. Foci of low signal intensity in the soft tissues about the head of the 2nd metatarsal are suspicious for gas, and can be seen in the setting of gas forming infection or recent intervention. 3. Edema/myositis in the fatty replaced intrinsic foot musculature about the 2nd metatarsal 4. Plantar subcutaneous fat and skin thickening at the 1st and 2nd metatarsal heads, which can be related to cellulitis or chronic changes. Skin puckering at the plantar 3rd-4th metatarsal interspace is age indeterminate, and can reflect chronic or acute ulcerative etiology. 5. Presumed amputation of the 1st and 2nd phalanges distal to the metatarsal heads. Degenerative changes at the 1st tarsal metatarsal and 1st metatarsal sesamoid joint. Ct Head Wo Cont Result Date: 1/18/2021 EXAM: CT HEAD WO CONT CLINICAL INDICATION/HISTORY: confusion on blood thinner COMPARISON: January 13, 2021 TECHNIQUE: Axial CT imaging of the head was performed without intravenous contrast. Sagittal and coronal reconstructions are provided. One or more dose reduction techniques were used on this CT: automated exposure control, adjustment of the mAs and/or kVp according to patient size, and iterative reconstruction techniques. The specific techniques used on this CT exam have been documented in the patient's electronic medical record. Digital Imaging and Communications in Medicine (DICOM) format image data are available to nonaffiliated external healthcare facilities or entities on a secure, media free, reciprocally searchable basis with patient authorization for at least a 12-month period after this study _______________ FINDINGS: BRAIN AND POSTERIOR FOSSA: Generalized age-related cortical atrophy. There is no intracranial hemorrhage, mass effect, or midline shift. There are scattered and confluent foci of decreased attenuation in the periventricular white matter which are nonspecific but most likely sequelae of chronic microvascular disease. EXTRA-AXIAL SPACES AND MENINGES: There are no abnormal extra-axial fluid collections. CALVARIUM: Intact. SINUSES: Left sphenoid sinuses under aerated and opacified. OTHER: None. _______________ IMPRESSION: No acute intracranial abnormalities. Stable chronic findings as above. Ct Head Wo Cont Result Date: 1/13/2021 EXAM: CT HEAD, WITHOUT IV CONTRAST INDICATION: Lethargy, headache, bilateral lower extremity swelling COMPARISON: None TECHNIQUE: Multiple axial CT images of the head were obtained extending from the skull base through the vertex. Additional coronal and sagittal reformations were also performed. One or more dose reduction techniques were used on this CT: automated exposure control, adjustment of the mAs and/or kVp according to patient size, and iterative reconstruction techniques. The specific techniques used on this CT exam have been documented in the patient's electronic medical record. Digital Imaging and Communications in Medicine (DICOM) format image data are available to nonaffiliated external healthcare facilities or entities on a secure, media free, reciprocally searchable basis with patient authorization for at least a 12-month period after this study. _______________ FINDINGS: BRAIN AND POSTERIOR FOSSA: There are a few small, probably chronic right and left basal ganglia lacunar infarcts. There is generalized prominence of the ventricular system, associated with proportional widening of the cortical cerebral sulci, compatible with generalized volume loss. Hazy hypoattenuation identified along the periventricular white matter, a nonspecific finding which is most commonly encountered in the setting of chronic microvascular disease. There is no intracranial hemorrhage, mass effect, or midline shift. There are no significant additional areas of abnormal parenchymal attenuation. EXTRA-AXIAL SPACES AND MENINGES: There are no abnormal extra-axial fluid collections. CALVARIUM: No acute osseous abnormality. OTHER: The visualized portions of the paranasal sinuses and mastoid air cells are clear. _______________ IMPRESSION: 1. No CT evidence of an acute intracranial abnormality - in particular, no acute cortical infarct, hemorrhage, or mass effect. 2.  Mild cerebral atrophy/volume loss and periventricular white matter changes, most commonly seen with chronic microvascular disease. Xr Chest [facility] Result Date: 1/13/2021 EXAM: XR CHEST PORT CLINICAL INDICATION/HISTORY: gen weakness -Additional: None COMPARISON: 1/8/2021 TECHNIQUE: Portable frontal view of the chest _______________ FINDINGS: SUPPORT DEVICES: None. HEART AND MEDIASTINUM: Cardiomediastinal silhouette within normal limits. LUNGS AND PLEURAL SPACES: Hypoinflated lungs. No dense consolidation, large effusion or pneumothorax. _______________ IMPRESSION: No acute cardiopulmonary abnormality. Xr Chest [facility] Result Date: 1/8/2021 EXAM: XR CHEST PORT CLINICAL INDICATION/HISTORY: Chest pain -Additional: None COMPARISON: 11/17/2014 TECHNIQUE: Portable frontal view of the chest _______________ FINDINGS: SUPPORT DEVICES: None. HEART AND MEDIASTINUM: Cardiomegaly. LUNGS AND PLEURAL SPACES: No dense consolidation, large effusion or pneumothorax. _______________ IMPRESSION: Cardiomegaly. No acute radiographic abnormality. Echo Adult Complete Result Date: 1/14/2021 · LV: Estimated LVEF is 45 - 50%. Normal cavity size. Moderate concentric hypertrophy. Low normal systolic function. E/E' ratio = 21.00. Unable to assess diastolic function. E/E' ratio = 21.00. · LA: Mildly dilated left atrium. Left Atrium volume index is 36 mL/m2. · RA: Mildly dilated right atrium. · MV: Mitral valve thickening. Mild to moderate mitral valve regurgitation is present. · Pericardium: Trivial pericardial effusion adjacent to right ventricle and right atrium. Duplex Lower Ext Venous Bilat Result Date: 1/13/2021 · No evidence of deep vein thrombosis in the right lower extremity veins assessed. · No evidence of deep vein thrombosis in the left lower extremity veins assessed. Patency of mid to distal femoral vein bilaterally is done by the color flow and doppler signal only Duplex Lower Ext Artery Bilat Result Date: 1/14/2021 No evidence of any significant peripheral arterial disease in the bilateral lower extremity. Triphasic waveforms noted throughout. [facility] CC: Bennett Amanda MD

## 2021-01-26 NOTE — PROGRESS NOTES
Problem: Mobility Impaired (Adult and Pediatric) Goal: *Acute Goals and Plan of Care (Insert Text) Description: Physical Therapy Goals Initiated 1/14/2021 and to be accomplished within 7 day(s) 1. Patient will move from supine <> sit with SBA in prep for out of bed activity and change of position. 2.  Patient will perform sit<> stand with CGA/min A/RW in prep for transfers/ambulation. 3.  Patient will transfer from bed <> chair with CGA/min A/RW for time up in chair for completion of ADL activity. 4.  Patient will ambulate 50 feet with CGA/MIN A/RW for improved functional mobility at discharge. 5.  Patient will ascend/descend 3-5 stairs with handrail(s) with minimal assistance/contact guard assist for home re-entry as needed. Physical Therapy Goals Updated 1/21/2021 and to be accomplished within 7 day(s) 1. Patient will move from supine <> sit with CGA in prep for out of bed activity and change of position. 2.  Patient will perform sit<> stand with min/modA/RW in prep for transfers/ambulation. 3.  Patient will transfer from bed <> chair with min/modA/RW for time up in chair for completion of ADL activity. 4.  Patient will ambulate 20 feet with MIN A/RW for improved functional mobility at discharge. Outcome: Not Progressing Towards Goal 
 
Pt refused PT due to: 
[x]  Planning to D/c. 
[]  Eating 
[]  Pain 
[]  Pt lethargic 
[]  Off Unit Pt informed that he was not being D/c'd today. He continued to refuse. Will f/u tomorrow, if pt is willing to participate. Thank you.  
Duyen Cardoza, PTA

## 2021-01-26 NOTE — PROGRESS NOTES
Transition of Care Plan: Discharge to [facility] today, rapid covid test pending. Communication to Patient/Family: Met with patient and family, and they are agreeable to the transition plan. The Plan for Transition of Care is related to the following treatment goals: Cellulitis and abscess of leg The Patient and/or patient representative was provided with a choice of provider and agrees  
with the discharge plan. Yes [x] No [] Freedom of choice list was provided with basic dialogue that supports the patient's individualized plan of care/goals and shares the quality data associated with the providers. Yes [x] No [] SNF/Rehab Transition: 
Patient has been accepted to [facility] at [address] [facility], [address] [facility] for SNF and meets criteria for admission. Patient will transported by [address] [place] [address] and expected to leave at 1700. Communication to SNF/Rehab: 
Bedside RN, Carmelina PAUL RN, has been notified to update the transition plan to the facility and call report [phone]. Discharge information has been updated on the AVS and communicated through [address] or CC Link. Discharge instructions to be fax'd to facility at [phone] Please include all hard scripts for controlled substances, med rec and dc summary, and AVS in packet. Please medicate for pain prior to dc if possible and needed to help offset delay when patient first arrives to facility. Reviewed and confirmed with facility, [facility] can manage the patient care needs for the following:  
 
Rukhsana Blair with (X) only those applicable: 
Medication: 
[]Medications are available at the facility [x]IV Antibiotics []Controlled Substance  hard copies available sent. []Weekly Labs Equipment: 
[]CPAP/BiPAP []Wound Vacuum []Andujar or Urinary Device []PICC/Central Line []Nebulizer []Ventilator Treatment: 
[]Isolation (for MRSA, VRE, etc.) []Surgical Drain Management []Tracheostomy Care [x]Dressing Changes []Dialysis with transportation []PEG Care []Oxygen []Daily Weights for Heart Failure Dietary: 
[]Any diet limitations []Tube Feedings []Total Parenteral Management (TPN) Financial Resources: 
[]Medicaid Application Completed []UAI Completed and copy given to pt/family. []A screening has previously been completed. []Level II Completed 
 
[x] Private pay individual who will not become  
financially eligible for Medicaid within 6 months from admission to a [address] [place] [address]. [] Individual refused to have screening conducted. []Medicaid Application Completed []The screening denied because it was determined individual did not need/did not qualify for nursing facility level of care. [] Out of state residents seeking direct admission to a [address] facility. [] Individuals who are inpatients of an out of state hospital, or in state or out of state veterans/ hospital and seek direct admission to a [address] facility [] Individuals who are pateints or residents of a state owned/operated facility that is licensed by Department of [employer] ([employer]) and seek direct admission to [address] facility [] A screening not required for enrollment in [facility] services as set out in 12 VAC 30- [] [facility]) staff shall perform screenings of the [facility] clients. Advanced Care Plan: 
[]Surrogate Decision Maker of Care 
[]POA []Communicated Code Status and copy sent. Other:

## 2021-01-26 NOTE — PROGRESS NOTES
[id number]: The pt rested well overnight with no complaints. No new clinical issues. Night shift chart check completed

## 2021-01-26 NOTE — PROGRESS NOTES
Cm notified Lab on Rapid covid for placement, cm spoke with [facility].

## 2021-01-26 NOTE — PROGRESS NOTES
Physician Progress Note Iván Oro 
CSN #:                  [id number] :                       1940 ADMIT DATE:       2021 9:56 AM 
[address] DATE: 
RESPONDING 
PROVIDER #:        Rolando Mack MD 
 
 
 
 
QUERY TEXT: 
 
Dear Hospitalist, Pt admitted with Diabetic foot ulcer and osteomyelitis and cellulitis. Pt noted to have \"some discharge from amputated toes site. \" If possible, please document in progress notes and discharge summary the relationship, if any, between Cellulitis and abscess of leg and the amputation stump. The medical record reflects the following: 
 
Risk Factors: DM ll, Cellulitis and abscess of leg, lymphedema, Amputation of right great toe Clinical Indicators: 
> Several attending physician progress notes state \"Diabetic Foot Ulcer with some discharge from amputated toes site. \" 
> MRI done- suspected OM, Findings suspicious for osteomyelitis in the head and neck of the 2nd metatarsal, with surrounding cellulitis Treatment: Receiving MRI, IV Zosyn, IV vancomycin Thank you, Renny Mckeon RN, BSN, [address] [place] [address] 
[phone] Options provided: 
-- Discharge from amputation site as a postoperative complication with infection -- Discharge from the amputation site related to other incidental risk factor occurring following surgery and not a complication, Please document incidental risk factor. -- Other - I will add my own diagnosis -- Disagree - Not applicable / Not valid -- Disagree - Clinically unable to determine / Unknown 
-- Refer to Clinical Documentation Reviewer PROVIDER RESPONSE TEXT: 
 
Patient has Discharge from amputation site as a postoperative complication with infection.  
 
Query created by: Dary Saenz on 2021 9:05 AM 
 
 
QUERY TEXT: 
 
Dear Hospitalist, 
 
 Pt admitted with Diabetic foot ulcer, OM, cellulitis . Pt noted to have confusion with delirium. If possible, please document in the progress notes and discharge summary if you are evaluating and / or treating any of the following: The medical record reflects the following: 
 
Risk Factors: Diabetic foot ulcer, OM, cellulitis, HTN Clinical Indicators: 
> Podiatry 1/19- \"He is slightly confused as to time but can accurately understand the described procedure. \" 
> Per nursing note 1/16 pt was disoriented to place 
> Per nursing note 1/17 disoriented to situation 
> Per nursing note 1/18 confusion, disoriented to place, situation and time 
> Per Dr. Popeye Willis 1/18- Delirium am 
> 1/19 delirium resolved per Dr. Popeye Willis PN 
> Per nursing 1/19 confused, disoriented to time and situation, disoriented to place Treatment: Receiving fall precautions with side rails up x 3, IV zosyn, IV vanc Thank you, Rita Britt RN, BSN, [address] [place] [address] 
[phone] Options provided: 
-- Metabolic encephalopathy 
-- Septic encephalopathy 
-- Toxic encephalopathy 
-- Encephalopathy due to medications or drugs, Please specify -- Toxic metabolic encephalopathy 
-- Delirium due to, Please specify cause 
-- Delirium -- Other - I will add my own diagnosis -- Disagree - Not applicable / Not valid -- Disagree - Clinically unable to determine / Unknown 
-- Refer to Clinical Documentation Reviewer PROVIDER RESPONSE TEXT: 
 
This patient has metabolic encephalopathy. Query created by: Yoni Sesay on 1/20/2021 9:21 AM 
 
 
QUERY TEXT: 
 
Dear Hospitalist, Pt admitted with cellulitis, diabetic foot ulcer, OM. Pt noted to have SIRS criteria of WBC, HR. If possible, please document in the progress notes and discharge summary if you are evaluating and /or treating any of the following: The medical record reflects the following: 
 
Risk Factors: cellulitis, diabetic foot ulcer, OM Clinical Indicators: 
> WBC 15.0, 15.4, 16.0, 15.7 > HR- 101, 102, 103 
> Wound culture 1/15- few MRSA 
> Confusion/delirium Treatment: Receiving IV zosyn, IV vancomycin Thank you, Latoya Oswald, RN, BSN, [address] [place] [address] 
[phone] Options provided: 
-- MRSA Sepsis, present on admission -- MRSA Sepsis, present on admission now resolved -- No Sepsis, cellulitis/osteomyelitis only 
-- Other - I will add my own diagnosis -- Disagree - Not applicable / Not valid -- Disagree - Clinically unable to determine / Unknown 
-- Refer to Clinical Documentation Reviewer PROVIDER RESPONSE TEXT: 
 
This patient has cellulitis/osteomyelitis only, patient is not septic.  
 
Query created by: Gil Gant on 1/20/2021 9:30 AM 
 
 
Electronically signed by:  Justin Winn MD 1/26/2021 4:48 PM

## 2021-01-26 NOTE — PROGRESS NOTES
Bedside and Verbal shift change report given to Kerry Kawasaki RN (oncoming nurse) by Elly Hobbs RN (offgoing nurse). Report included the following information SBAR, Kardex, Intake/Output, MAR and Recent Results. Shift assessment complete. Pt AO x4 and denies pain. Lung sounds clear upon auscultation, pt on room air. White board update & call light within reach. Shift uneventful, Bedside and Verbal shift change report given to Rojelio Toney (oncoming nurse) by Kerry Kawasaki RN (offgoing nurse). Report included the following information SBAR, Kardex, Intake/Output, MAR and Recent Results. Heparin drip verified at bedside with RN.

## 2021-01-26 NOTE — H&P
The patient is an appropriate candidate to undergo tunneled picc. Patient assessed immediately prior to induction. Anesthesia plan as follows: Local/No Anesthesia. History and Physical update:  H&P was reviewed and the patient was examined. No changes have occurred in the patient's condition since the H&P was completed. Jose M Sow MD 
Vascular & Interventional Radiology [facility] 1/26/2021

## 2021-01-26 NOTE — PROGRESS NOTES
Problem: Falls - Risk of 
Goal: *Absence of Falls Description: Document Sarai Chou Fall Risk and appropriate interventions in the flowsheet. Outcome: Progressing Towards Goal 
Note: Fall Risk Interventions: 
Mobility Interventions: Bed/chair exit alarm, Patient to call before getting OOB, Communicate number of staff needed for ambulation/transfer Mentation Interventions: Adequate sleep, hydration, pain control, Bed/chair exit alarm, Toileting rounds Medication Interventions: Teach patient to arise slowly, Patient to call before getting OOB, Bed/chair exit alarm Elimination Interventions: Patient to call for help with toileting needs, Call light in reach History of Falls Interventions: Consult care management for discharge planning, Investigate reason for fall, Room close to nurse's station, Bed/chair exit alarm Problem: Patient Education: Go to Patient Education Activity Goal: Patient/Family Education Outcome: Progressing Towards Goal 
  
Problem: Pressure Injury - Risk of 
Goal: *Prevention of pressure injury Description: Document Po Scale and appropriate interventions in the flowsheet. Outcome: Progressing Towards Goal 
Note: Pressure Injury Interventions: 
Sensory Interventions: Assess changes in LOC, Pressure redistribution bed/mattress (bed type), Monitor skin under medical devices, Keep linens dry and wrinkle-free Moisture Interventions: Minimize layers, Absorbent underpads Activity Interventions: Pressure redistribution bed/mattress(bed type) Mobility Interventions: Pressure redistribution bed/mattress (bed type), PT/OT evaluation Nutrition Interventions: Document food/fluid/supplement intake, Offer support with meals,snacks and hydration Friction and Shear Interventions: Minimize layers Problem: Patient Education: Go to Patient Education Activity Goal: Patient/Family Education Outcome: Progressing Towards Goal 
  
 Problem: Patient Education: Go to Patient Education Activity Goal: Patient/Family Education Outcome: Progressing Towards Goal 
  
Problem: Cellulitis Care Plan (Adult) Goal: *Control of acute pain Outcome: Progressing Towards Goal 
Goal: *Skin integrity maintained Outcome: Progressing Towards Goal 
Goal: *Absence of infection signs and symptoms Outcome: Progressing Towards Goal 
  
Problem: Patient Education: Go to Patient Education Activity Goal: Patient/Family Education Outcome: Progressing Towards Goal 
  
Problem: Hypertension Goal: *Blood pressure within specified parameters Outcome: Progressing Towards Goal 
Goal: *Fluid volume balance Outcome: Progressing Towards Goal 
Goal: *Labs within defined limits Outcome: Progressing Towards Goal 
  
Problem: Patient Education: Go to Patient Education Activity Goal: Patient/Family Education Outcome: Progressing Towards Goal 
  
Problem: Surgical Pathway Day of Surgery Goal: Nutrition/Diet Outcome: Progressing Towards Goal 
Goal: Medications Outcome: Progressing Towards Goal 
Goal: Respiratory Outcome: Progressing Towards Goal 
  
Problem: Risk for Spread of Infection Goal: Prevent transmission of infectious organism to others Description: Prevent the transmission of infectious organisms to other patients, staff members, and visitors. Outcome: Progressing Towards Goal 
  
Problem: Patient Education:  Go to Education Activity Goal: Patient/Family Education Outcome: Progressing Towards Goal

## 2021-01-26 NOTE — ROUTINE PROCESS
Shift change report given to [address] (oncoming nurse) by Min Cervantes RN (offgoing nurse). Report included the following information SBAR, Kardex, Intake/Output, MAR, Recent Results, and Cardiac Rhythm Afib BBB.

## 2021-01-26 NOTE — PROCEDURES
Vascular & Interventional Radiology Brief Procedure Note Interventional Radiologist: Maribel Guardado MD 
 
Pre-operative Diagnosis:  Tunneled PICC Post-operative Diagnosis: Same as pre-op dx Procedure(s) Performed:  same Anesthesia:  Local and Moderate Sedation Findings:  Uncomplicated R IJ/chest wall tunneled line placement. Complications: None Estimated Blood Loss:  minimal 
 
Tubes and Drains: None Specimens: None Condition: Good Maribel Guardado MD 
[facility] Vascular & Interventional Radiology 1/26/2021

## 2021-01-26 NOTE — PROGRESS NOTES
TRANSFER - OUT REPORT: 
 
Verbal report given to [address] LPN(name) on Phillip [facility].  being transferred to [facility] RN(unit) for routine progression of care Report consisted of patients Situation, Background, Assessment and  
Recommendations(SBAR). Information from the following report(s) SBAR, Kardex, Intake/Output and MAR was reviewed with the receiving nurse. Lines:  
Double Lumen DL TNL MEDCOMP 01/26/21 Right Internal jugular (Active) Peripheral IV 01/14/21 Right Other(comment) (Active) Site Assessment Clean, dry, & intact 01/26/21 3043 Phlebitis Assessment 0 01/26/21 0905 Infiltration Assessment 0 01/26/21 0905 Dressing Status Clean, dry, & intact 01/26/21 7289 Dressing Type Tape;Transparent 01/26/21 0905 Hub Color/Line Status Capped 01/26/21 9056 Action Taken Open ports on tubing capped 01/26/21 0905 Alcohol Cap Used Yes 01/26/21 3753 Peripheral IV 01/20/21 Right Wrist (Active) Site Assessment Clean, dry, & intact 01/26/21 3398 Phlebitis Assessment 0 01/26/21 0905 Infiltration Assessment 0 01/26/21 0905 Dressing Status Clean, dry, & intact 01/26/21 1787 Dressing Type Tape;Transparent 01/26/21 0905 Hub Color/Line Status Blue 01/26/21 9985 Action Taken Open ports on tubing capped 01/26/21 0905 Alcohol Cap Used Yes 01/26/21 6038 Opportunity for questions and clarification was provided. Patient transported with: 
 [employer]

## 2021-02-04 NOTE — PROGRESS NOTES
Community Care Team Documentation for Patient in [facility]     Patient discharged from [facility] [employer] [facility]. to [address] [place] [address] on 1/28/21. Hospital Discharge diagnosis:       Acute CHF   Lymphedema   Chronic Osteomyelitis of Right Foot   MRSA   Amputation of Right Great Toe   Stage 3 kidney disease   DM Type II    SNF Attending Provider:       Anticipated discharge date from SNF:  TBD, on IV Antibiotics until 3/1/21    PCP : Alejo Hernandez MD    [facility] Team rounds completed, updates provided by facility. Brief Summary of Care:    Patient receiving PT/OT. Only able to get in bariatric chair - unable to tolerate w/c yet. Non ambulatory at this time. Dispo:  Patient was living with wife PTA. Wife wants to take pt home early but pt needs Antibiotics until 3/1/21. RRAT:  Low Risk            12       Total Score        2 . Living with Significant Other. Assisted Living. LTAC. SNF. or   Rehab    10 Charlson Comorbidity Score (Age + Comorbid Conditions)        Criteria that do not apply:    Has Seen PCP in Last 6 Months (Yes=3, No=0)    Patient Length of Stay (>5 days = 3)    IP Visits Last 12 Months (1-3=4, 4=9, >4=11)    Pt.  Coverage (Medicare=5 , Medicaid, or Self-Pay=4)        Active Ambulatory Problems     Diagnosis Date Noted    Cellulitis and abscess of leg, except foot 01/13/2013    HTN (hypertension) 01/13/2013    Glaucoma 01/13/2013    Unspecified sleep apnea     ARF (acute renal failure) ([address] 06/03/2013    Chronic venous insufficiency 06/03/2013    Cellulitis of leg, right 11/16/2014    Lymphedema 01/13/2021    Acute CHF (congestive heart failure) ([address] 01/13/2021    Hypokalemia 01/14/2021    Amputation of right great toe ([address] 01/14/2021    Diabetic ulcer of right foot associated with type 2 diabetes mellitus ([address] 01/14/2021    Stage 3 chronic kidney disease 01/14/2021    Chronic osteomyelitis of right foot ([facility] 01/22/2021    MRSA infection 01/22/2021     Resolved Ambulatory Problems     Diagnosis Date Noted    No Resolved Ambulatory Problems     Past Medical History:   Diagnosis Date    Arrhythmia     Arthritis     Atrial fibrillation (HCC)     Cellulitis     Hypertension

## 2021-02-12 NOTE — PROGRESS NOTES
Post Acute Facility Update     *The information contained in this note was received during a weekly care coordination call with the post acute facility*    Current Facility: [address] (SNF)  Anticipated Discharge Date: 3/5/21    Facility Nursing Update: Receiving IV antibiotics until 3/4/21  Facility Social Work Update: Plan to return home w/ wife  ADL's: Minimal Assistance  Current Level of Assistance: Minimal Assistance  Bed Mobility: Minimal Assistance  Transfers: Moderate Assistance  Ambulation: Maximum Assistance  How far (in feet) is the patient ambulating?  NWB on RLE so only taking few steps  Does patient use an assistive device: yes  If yes, device used: Walker with max assist  Barriers to Discharge: Therapy very slow due to weight bearing status  Other: none    Has walker, w/c already

## 2021-02-17 NOTE — WOUND CARE
[facility] 
(A Division of [address] [place] [address]) Follow-up Note Date of Admission: 2/17/2021       Date of Note:  2/17/2021 Summary: Mr Julio Cesar Aj is an 79y WM with underlying CKD/lymphedema who presented 1/13 with several week h/o progressive R foot infection for which he went to OR 1/15 for initial I&D and then more complete TMA 1/20 with skin flap closure of initial wound. Oscar Garcia MRSA has grown. Suersh Booker has been on vanco + pip/tzb since admission.  Kidneys a little worse. Suresh Booker feels fine today.  Talkative. 
  
Retired  with extensive [address] travel 
  
DOS - 1/15 and 1/20 Last seen in hosp 1/25 CC:  \"My foot don't hurt\" Interval History: 
Since I've last seen him, he is currently in [address] [place] [address] SNF getting his vancomycin. Because of worsening renal function, he dosing has decreased. Has some anosmia/altered taste with slight dyspnea; has had diarrhea for past few weeks. Abdomen feels ok Current Antimicrobials:    Prior Antimicrobials: 1. Vanco IV (1/13-) #28 1. Pip/tzb IV (1/13-22) Assessment: Rec / Plan:  
Extensive R foot MRSA OM/infection deep - DOS 1/20 1/25:  CRP 114mg/L 
2/1:  CRP 133mg/L 
2/15:  CRP 123mg/L Foot looks a whole lot better, but his CRP does NOT; makes me think about concurrent COVID-19 (mild) if present would increase his inflammatory markers. Keep pushing even though RITA; finish the course and then go on short/1m PO doxy (renal-safe) just in case he DOESN'T have COVID19 ->POD #28 Elina Null I'll schedule with Radiology to remove tunneled line afterwards. Start doxy 100mg PO daily that next day. RTC here 4wks ([id number]) for review. Recommend to SNF to check a COVID-19 test  
 RITA on CKD stage IV/III   
DMT2 good control A1c 6% Chronic Lymphedema HTN Microbiology:                1/15 - OR (+) MRSA 
                                       1/13 - BCx x 2 (-)   
  
 Ludmila Gastelum / Althea Edwards Patient Active Problem List  
Diagnosis Code  Cellulitis and abscess of leg, except foot L03.119, L02.419  
 HTN (hypertension) I10  
 Glaucoma H40.9  Unspecified sleep apnea G47.30  ARF (acute renal failure) ([facility]) N17.9  Chronic venous insufficiency I87.2  Cellulitis of leg, right L03.115  Lymphedema I89.0  Acute CHF (congestive heart failure) ([facility]) I50.9  Hypokalemia E87.6  Amputation of right great toe [facility]) L13.031V  Diabetic ulcer of right foot associated with type 2 diabetes mellitus ([facility] E11.621, L97.519  Stage 3 chronic kidney disease N18.30  Chronic osteomyelitis of right foot ([facility] M86.671  MRSA infection A49.02  
 
 
Current Outpatient Medications Medication Sig Dispense  amLODIPine (NORVASC) 5 mg tablet Take 1 Tab by mouth daily. 30 Tab  vancomycin 750 mg 750 mg 750 mg by IntraVENous route every seventy-two (72) hours. 1 Dose  acetaminophen (TYLENOL) 325 mg tablet Take 3 Tabs by mouth every six (6) hours as needed for Pain. 20 Tab  loratadine (CLARITIN) 10 mg tablet Take 10 mg by mouth daily.  apixaban (ELIQUIS) 2.5 mg tablet Take 2.5 mg by mouth two (2) times a day.  melatonin 5 mg cap capsule Take 5 mg by mouth as needed.  atenolol (TENORMIN) 25 mg tablet Take 50 mg by mouth every evening.  bimatoprost (LUMIGAN) 0.03 % ophthalmic drops Administer 1 drop to both eyes every evening.  niacin (NIASPAN) 1,000 mg Tb24 tab Take 1,000 mg by mouth nightly.  multivitamin (ONE A DAY) tablet Take 1 Tab by mouth daily.  calcium-cholecalciferol, d3, (CALCIUM 600 + D) 600-125 mg-unit Tab Take  by mouth. No current facility-administered medications for this encounter. Review of Systems - General ROS: positive for  - fatigue and malaise 
negative for - chills, fever or night sweats ENT ROS: positive for - anosmia/altered taste Respiratory ROS: positive for - cough and shortness of breath 
negative for - hemoptysis or sputum changes Cardiovascular ROS: positive for - dyspnea on exertion and shortness of breath 
negative for - chest pain Gastrointestinal ROS: positive for - diarrhea 
negative for - abdominal pain Objective: There were no vitals taken for this visit. No data recorded. GEN: Elderly WM in Stretcher in NAD; mild cough HEENT: anicteric CHEST: CTA; tunneled line R upper CW clean CVS:RRR 
ABD: NT 
R Foot: incision clean/dry ; no odor Lab results: 
 
Chemistry Recent Labs  
  02/15/21 
0700 *   
K 3.9 * CO2 16*  
* CREA 4.39* CA 8.2* AGAP 15 BUCR 24* CBC w/ Diff Recent Labs  
  02/15/21 
0700 WBC 9.8  
RBC 2.33* HGB 7.1*  
HCT 22.6*  
 GRANS 74* LYMPH 14* EOS 5 Microbiology All Micro Results None Javier Patel MD 
Cell [phone] [facility] 2/17/2021  
11:01 AM

## 2021-02-19 NOTE — PROGRESS NOTES
Post Acute Facility Update     *The information contained in this note was received during a weekly care coordination call with the post acute facility*    Current Facility: [address] ([facility])  Anticipated Discharge Date: TBD    Facility Nursing Update: Antibiotics on hold at present due to elevated renal function  Facility Social Work Update: Plan to return home w/ wife  ADL's: Minimal Assistance  Current Level of Assistance: Minimal Assistance  Bed Mobility: Minimal Assistance  Transfers: Moderate Assistance  Ambulation: Maximum Assistance  How far (in feet) is the patient ambulating? NWB on RLE so only taking few steps  Does patient use an assistive device: yes  If yes, device used: Walker with max assist  Barriers to Discharge: Therapy very slow due to weight bearing status  Other: May have to stay til April due to Renal function and need for antibiotics.     Has walker, w/c already

## 2021-02-22 NOTE — Clinical Note
Externals AAC left voice mail message for Angeline with Home [facility] asking for return call to [phone].  INR was due yesterday.  Would like to discuss with Angeline.     External AAC called and spoke with Chata at [facility], where patient resides.  Chata is unsure if INR was done yesterday.  Will wait for return call from Angeline with Home Health.        Status[de-identified] INPATIENT [101]   Type of Bed: Intensive Care [6]   Inpatient Hospitalization Certified Necessary for the Following Reasons: 4.  Patient requires ICU level of care interventions (further clarification in H&P documentation)   Admitting Diagnosis: CHF (congestive heart failure) [facility]) [[id number]]   Admitting Diagnosis: Hypoxia [481584]   Admitting Diagnosis: Dyspnea [491342]   Admitting Physician: Kirstie Hendrix [[id number]]   Attending Physician: Kirstie Hendrix [[id number]]   Estimated Length of Stay: 2 Midnights   Discharge Plan[de-identified] Extended Care Facility (e.g. Adult Home, Nursing Home, etc.)

## 2021-02-23 PROBLEM — I50.9 CHF (CONGESTIVE HEART FAILURE) (HCC): Status: ACTIVE | Noted: 2021-01-01

## 2021-02-23 PROBLEM — R06.00 DYSPNEA: Status: ACTIVE | Noted: 2021-01-01

## 2021-02-23 PROBLEM — R09.02 HYPOXIA: Status: ACTIVE | Noted: 2021-01-01

## 2021-02-23 PROBLEM — R33.8 ACUTE URINARY RETENTION: Status: ACTIVE | Noted: 2021-01-01

## 2021-02-23 PROBLEM — D64.9 ANEMIA: Status: ACTIVE | Noted: 2021-01-01

## 2021-02-23 PROBLEM — I48.20 CHRONIC ATRIAL FIBRILLATION (HCC): Status: ACTIVE | Noted: 2021-01-01

## 2021-02-23 PROBLEM — D69.6 THROMBOCYTOPENIA (HCC): Status: ACTIVE | Noted: 2021-01-01

## 2021-02-23 PROBLEM — I95.9 HYPOTENSION: Status: ACTIVE | Noted: 2021-01-01

## 2021-02-23 PROBLEM — J96.01 ACUTE HYPOXEMIC RESPIRATORY FAILURE (HCC): Status: ACTIVE | Noted: 2021-01-01

## 2021-02-23 NOTE — CONSULTS
[facility]. Urology Consultation Patient: Jonathan Sheffield. MRN: [id number]  SSN: [id number] YOB: 1940  Age: 79 y.o. Sex: male Subjective:  
 
Date of Consultation:  February 23, 2021 Referring Physician: Aleshia Sims Reason for Consultation:  Urinary retention, Scrotal and Penile edema History of Present Illness:  
Patient is a 79 y.o.  male who is being seen for scrotal and penile edema and urinary retention. Renata Kat He was admitted to the hospital for CHF (congestive heart failure) ([address] [I50.9] Hypoxia [R09.02] Dyspnea [R06.00]. Multiple attempt were made by nursing to place overton and urology consult was called. Pt does not give a good history but states he had prostate surgery years ago and he did not have prostate cancer Past Medical History:  
Diagnosis Date  ARF (acute renal failure) ([address] 6/3/2013  Arrhythmia A-Fib  Arthritis  Atrial fibrillation ([address]  Atrial fibrillation ([address]  Cellulitis  Chronic ulcer of right foot ([address]  CKD (chronic kidney disease) Stage III  Diabetes ([address]  Heart failure ([address]  Hypertension  Hypokalemia  Lymph edema  MRSA (methicillin resistant staph aureus) culture positive  Osteomyelitis ([address]  Traumatic amputation of right great toe ([address]  Unspecified sleep apnea   
 recently notified doesn't need c-pap Past Surgical History:  
Procedure Laterality Date  HX CATARACT REMOVAL    
 HX ORTHOPAEDIC    
 bilateral knee replacments  HX ORTHOPAEDIC  2017  
 betancourt procedure  IR INSERT TUNL CVC W/O PORT OVER 5 YR  1/26/2021 Family History Problem Relation Age of Onset  Cancer Mother  Heart Disease Father  Cancer Sister  Hypertension Sister Social History Tobacco Use  Smoking status: Never Smoker  Smokeless tobacco: Never Used Substance Use Topics  Alcohol use: No  
 
Allergies Allergen Reactions  Ambien [Zolpidem] Other (comments) Hallucinations Prior to Admission medications Medication Sig Start Date End Date Taking? Authorizing Provider Lactobac. rhamnosus GG-inulin ([address]) 10 billion cell -200 mg cap Take  by mouth. Yes Asha, MD Ashley  
alum-mag hydroxide-simeth (MYLANTA) 200-200-20 mg/5 mL susp Take 30 mL by mouth every four (4) hours as needed for Indigestion. Yes Other, MD Ashley  
doxycycline (ADOXA) 100 mg tablet Take 1 Tab by mouth daily. 2/17/21  Yes Jomar Barrera MD  
amLODIPine (NORVASC) 5 mg tablet Take 1 Tab by mouth daily. 1/27/21  Yes Therese Ashraf MD  
vancomycin 750 mg 750 mg 750 mg by IntraVENous route every seventy-two (72) hours. 1/26/21  Yes Therese Ashraf MD  
loratadine (CLARITIN) 10 mg tablet Take 10 mg by mouth daily. Yes Provider, Historical  
apixaban (ELIQUIS) 2.5 mg tablet Take 2.5 mg by mouth two (2) times a day. Yes Provider, Historical  
melatonin 5 mg cap capsule Take 5 mg by mouth as needed. Yes Provider, Historical  
atenolol (TENORMIN) 25 mg tablet Take 50 mg by mouth every evening. Yes Other, MD Ashley  
bimatoprost (LUMIGAN) 0.03 % ophthalmic drops Administer 1 drop to both eyes every evening. Yes Other, MD Ashley  
niacin (NIASPAN) 1,000 mg Tb24 tab Take 1,000 mg by mouth nightly. Yes Provider, Historical  
multivitamin (ONE A DAY) tablet Take 1 Tab by mouth daily. Yes Provider, Historical  
calcium-cholecalciferol, d3, (CALCIUM 600 + D) 600-125 mg-unit Tab Take  by mouth. Yes Provider, Historical  
acetaminophen (TYLENOL) 120 mg suppository Insert 650 mg into rectum every four (4) hours as needed for Fever. Other, MD Ashley  
bisacodyL (Dulcolax, bisacodyl,) 10 mg supp Insert 10 mg into rectum. Other, MD Ashley  
maltodextrin (FIBER POWDER PO) Take  by mouth.  1 tsp mix in 8 oz juice    Other, MD Ashley  
 magnesium hydroxide ([place] Milk of Magnesia) 400 mg/5 mL suspension Take 30 mL by mouth daily as needed for Constipation. Ashley Barry MD  
traMADoL (ULTRAM) 50 mg tablet Take 50 mg by mouth every six (6) hours as needed for Pain. Ashley Barry MD  
ondansetron hcl (Zofran) 4 mg tablet Take 4 mg by mouth every eight (8) hours as needed for Nausea or Vomiting. Ashley Barry MD  
acetaminophen (TYLENOL) 325 mg tablet Take 3 Tabs by mouth every six (6) hours as needed for Pain.  1/8/21   Iam Yousif MD  
 
 
Objective:  
 
Patient Vitals for the past 8 hrs: 
 BP Temp Pulse Resp SpO2 Weight  
02/23/21 0500 95/77  70 25 98 %   
02/23/21 0451     100 %   
02/23/21 0445 110/79  70 (!) 32 97 %   
02/23/21 0430 93/60  72 21 92 %   
02/23/21 0429 93/60  71 25 95 %   
02/23/21 0424 (!) 84/45  74 (!) 32 94 %   
02/23/21 0415 (!) 103/48  72 26 97 %   
02/23/21 0327 (!) 105/56  71 15 97 %   
02/23/21 0315 97/64  68 18 94 %   
02/23/21 0302 (!) 91/58  65 19 96 %   
02/23/21 0300     (!) 86 %   
02/23/21 0258   67 25 (!) 86 %   
02/23/21 0245 106/79  73 20 (!) 85 %   
02/23/21 0230 (!) 109/44  67 25 95 %   
02/23/21 0226 102/87  64 24 90 %   
02/23/21 0204 (!) 99/46  69 26 94 %   
02/23/21 0156 (!) 124/43  75 27 91 %   
02/23/21 0145 (!) 97/58  68 21 91 %   
02/23/21 0140 103/67  66     
02/23/21 0115 99/69  65 27 93 %   
02/23/21 0103 (!) 109/58  61 22 94 %   
02/23/21 0045 (!) 107/57  70 28 94 %   
02/23/21 0015 (!) 103/57  63 (!) 32 94 %   
02/23/21 0007 (!) 103/55  69 (!) 32 99 %   
02/23/21 0006   70 18 97 %   
02/23/21 0001 96/70  72 27 (!) 89 %   
02/22/21 2345 (!) 101/56  66 21 100 %   
02/22/21 2330 95/76  71 18 100 %   
02/22/21 2319 (!) 93/44  64 23 100 %   
02/22/21 2317 (!) 91/58  67 19 100 %   
02/22/21 2305 (!) 97/47  64 20 100 %   
02/22/21 2300 (!) 88/41  61 26 100 %   
02/22/21 2244    21 98 %   
 21 2240 (!) 89/53  68 24 92 %   
218 (!) 90/49  68 27 94 %   
21 (!) 94/53  69 22    
21     (!) 82 %   
21 (!) 99/52 97.8 °F (36.6 °C) 72 20 96 % 99.8 kg (220 lb) Temp (24hrs), Av.8 °F (36.6 °C), Min:97.8 °F (36.6 °C), Max:97.8 °F (36.6 °C) Intake and Output:  
No intake/output data recorded. Physical Exam: 
Constitutional Elderly male in mod discomfort Genitourinary Significant penile and scrotal edema Lab/Data Reviewed: 
BMP:  
Lab Results Component Value Date/Time  (H) 2021 10:11 PM  
 K 3.7 2021 10:11 PM  
  (H) 2021 10:11 PM  
 CO2 17 (L) 2021 10:11 PM  
 AGAP 11 2021 10:11 PM  
 GLU 98 2021 10:11 PM  
 BUN 75 (H) 2021 10:11 PM  
 CREA 3.37 (H) 2021 10:11 PM  
 GFRAA 21 (L) 2021 10:11 PM  
 GFRNA 18 (L) 2021 10:11 PM  
 
CBC:  
Lab Results Component Value Date/Time WBC 4.2 (L) 2021 04:48 AM  
 HGB 7.0 (L) 2021 04:48 AM  
 HCT 22.6 (L) 2021 04:48 AM  
 PLT 76 (L) 2021 04:48 AM  
 
Procedure:  Under sterile conditions, I was able to place a 16fr Coude overton, approximately 2 liters of clear urine obtained and then brown (feculant) urine about 200 ml, Assessment:  
 
Principal Problem: 
  Acute hypoxemic respiratory failure ([address] (2021) Active Problems: 
  Chronic venous insufficiency (6/3/2013) Stage 3 chronic kidney disease (2021) Chronic osteomyelitis of right foot ([address] (2021) CHF (congestive heart failure) ([address] (2021) Chronic atrial fibrillation ([address] (2021) Thrombocytopenia ([address] (2021) Anemia (2021) Acute urinary retention (2021) Hypotension (2021) Impression:  79 yr old male with penile,scrotal edema with urinary retention. 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 Continue overton as long as needed from medical standpoint 2. Scrotal elevation 3. Await results of CT abd/pelvis 4. Urine C&S sent Time spent consulting and reviewing findings with patient 38 minutes. Greater than 50% spent counseling patient and coordinating care Signed By: Carli Tong MD  
                      February 23, 2021

## 2021-02-23 NOTE — ED NOTES
Due to patient continuously pulling of BIPAP mask, patient placed on HI FLOW oxygen. Tolerating well

## 2021-02-23 NOTE — PROGRESS NOTES
Pt placed on bipap at this time. Tolerating well: 
 
 
 02/23/21 0007 Oxygen Therapy O2 Sat (%) 99 % Pulse via Oximetry 64 beats per minute O2 Device BIPAP  
FIO2 (%) 40 % Respiratory Respiratory (WDL) X Respiratory Pattern Regular Chest/Tracheal Assessment Chest expansion, symmetrical  
CPAP/BIPAP  
CPAP/BIPAP Start/Stop On Device Mode BIPAP;S/T  
$$ Bipap Daily Yes [employer] ID # [id number] Mask Type and Size Full face; Medium PIP Observed 12 cm H20 IPAP (cm H2O) 12 cm H2O  
EPAP (cm H2O) 5 cm H2O Inspiratory Time (sec) 0.9 seconds Vt Spont (ml) 510 ml Ve Observed (l/min) 11.8 l/min Backup Rate 8 Total RR (Spontaneous) 23 breaths per minute Insp Rise Time (sec) 3 Leak (Estimated) 12 L/min  
Pt's Home Machine No  
Biomedical Check Performed Yes Settings Verified Yes Alarm Settings High Pressure 25 Low Pressure 5 Apnea 20 Low Ve 4 High Rate 55 Low Rate 8 Pulmonary Toilet Pulmonary Toilet H. O.B elevated

## 2021-02-23 NOTE — PROGRESS NOTES
Paged numerous times on this patient overnight. He was placed back onto high flow for comfort. He also required restraints as he was trying to pull off the high flow. Urology was consulted due to no urine output but high reading on bladder scan. Dr. Juda Canavan drained 2 L of black urine and placed overton. I confirmed with the patient that he desired intubation if he needed it as he had made comments to nursing about \"I can't do this anymore. \" He remains a full code. I am obtaining a noncontrast CT of chest/abdomen/pelvis to look for acute abnormality. His prognosis is poor.

## 2021-02-23 NOTE — ED NOTES
Patient resting comfortably on BiPAP. VSS. NAD. AOx4. Bed low and locked. Call bell within reach. Side rails up.

## 2021-02-23 NOTE — ED NOTES
Patient with no output of urine. Bladder scanned with >876mL of urine. Reassessed overton position, removed overton, and tried to change overton with multiple unsuccessful attempts by 3 different nurses. Paige Mehta MD order to page urology. Urology paged and Dr. Roberto Duran will be coming in to place overton.

## 2021-02-23 NOTE — PROGRESS NOTES
TRANSFER - IN REPORT: 
 
Verbal report received from SLAVA Garcia (name) on [facility].  being received from ED (unit) for routine progression of care Report consisted of patients Situation, Background, Assessment and  
Recommendations(SBAR). Information from the following report(s) SBAR, Kardex, Intake/Output and MAR was reviewed with the receiving nurse. Opportunity for questions and clarification was provided. Patient to stay in ED for about 30 more minutes

## 2021-02-23 NOTE — ROUTINE PROCESS
TRANSFER - OUT REPORT: 
 
Verbal report given to AJ(name) on Gladis Lee.  being transferred to medical(unit) for routine progression of care Report consisted of patients Situation, Background, Assessment and  
Recommendations(SBAR). Information from the following report(s) SBAR, Kardex, ED Summary and MAR was reviewed with the receiving nurse. Lines:  
Double Lumen 02/22/21 Right Other(comment) (Active) Central Line Being Utilized Yes 02/22/21 2230 Site Assessment Clean, dry, & intact 02/22/21 2230 Infiltration Assessment 0 02/22/21 2230 Dressing Status Clean, dry, & intact 02/22/21 2230 Dressing Type Transparent 02/23/21 0532 Positive Blood Return (Medial Site) Yes 02/22/21 2230 Positive Blood Return (Lateral Site) Yes 02/22/21 2230 Peripheral IV 02/22/21 Right Antecubital (Active) Site Assessment Clean, dry, & intact 02/22/21 2239 Phlebitis Assessment 0 02/22/21 2239 Infiltration Assessment 0 02/22/21 2239 Dressing Status Clean, dry, & intact 02/22/21 2239 Dressing Type Transparent 02/22/21 2239 Opportunity for questions and clarification was provided.    
 
Patient transported with: 
 medic 
o2 at @LPM

## 2021-02-23 NOTE — ED NOTES
Family wanted to spend some time with patient. Asked RN to leave room Family and pastoral care at bedside.

## 2021-02-23 NOTE — PROGRESS NOTES
visited with the family of Anshul Avalos, who is a 79 y.o.,male. The  provided the following Interventions: 
Initiated a relationship of care and support from call back to Emergency department. Family Bhavesh Hill, Dr. Shawn Rockwell of [facility] was present. Offered support, patient is comfort care at this time. Family at bedside supporting patient. .  
 
Plan: 
Chaplains will continue to follow and will provide pastoral care on an as needed/requested basis.  recommends bedside caregivers page  on duty if patient shows signs of acute spiritual or emotional distress. Rev. Deniz Solomon,[facility] [employer] 
[phone]

## 2021-02-23 NOTE — PROGRESS NOTES
1220- Patient arrived to floor. Care assumed by Daniella Mo RN 
 
1957- Spoke to Dr. Shamir Meza to request eye drops for the patient and for labs to be discontinued. She verbalized approval. See MAR. 
 
1920- Bedside and Verbal shift change report given to ELIA Linn (oncoming nurse) by Daniella Mo RN (offgoing nurse). Report included the following information SBAR, Kardex, Intake/Output and MAR.

## 2021-02-23 NOTE — ED NOTES
Dr. Kayla Damico at bedside she has spoken with kashif dejesus wife and she is coming in [phone]. She will make decisions about his plan of care when she arrives. Orders for soft wrist restraints received. Patient attempting to pull central line out and oxygen off [address] at bedside has fio2 for high flow at 100% and added NRB mask

## 2021-02-23 NOTE — ED PROVIDER NOTES
EMERGENCY DEPARTMENT HISTORY AND PHYSICAL EXAM 
 
Date: 2/22/2021 Patient Name: Kamila Friedman. History of Presenting Illness Chief Complaint Patient presents with  Shortness of Breath History Provided By: Patient [id number] Unruly Flores is a 79 y.o. male with PMHX of CHF, acute renal failure, A. 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 Patient was sent from [facility] after he had received IV fluids today given his elevated BUN and creatinine. They noted he became short of breath after the IV fluids and sent him here for evaluation. Blood pressure was running on the lower side at the facility as well. On arrival here to the emergency department his saturation was 82% on room air and he is frequently coughing. Immediately placed on nonrebreather with some improvement in oxygen saturation to low to mid 90s. He is awake, alert, and oriented and is full code. He is on Zosyn and vancomycin for chronic osteomyelitis. PCP: Roberto Ewing MD 
 
Current Facility-Administered Medications Medication Dose Route Frequency Provider Last Rate Last Admin  sodium chloride (NS) flush 5-40 mL  5-40 mL IntraVENous Q8H Jill Joy MD   Stopped at 02/23/21 6239  sodium chloride (NS) flush 5-40 mL  5-40 mL IntraVENous PRN Jill Joy MD      
 acetaminophen (TYLENOL) tablet 650 mg  650 mg Oral Q6H PRN Jill oJy MD      
 Or  
 acetaminophen (TYLENOL) suppository 650 mg  650 mg Rectal Q6H PRN Jill Joy MD      
 polyethylene glycol (MIRALAX) packet 17 g  17 g Oral DAILY PRN Jill Joy MD      
 promethazine (PHENERGAN) tablet 12.5 mg  12.5 mg Oral Q6H PRN Jill Joy MD      
 Or  
 ondansetron [facility]) injection 4 mg  4 mg IntraVENous Q6H PRN Jill Joy MD      
  piperacillin-tazobactam (ZOSYN) 3.375 g in 0.9% sodium chloride (MBP/ADV) 100 mL MBP  3.375 g IntraVENous Q6H Selina Chwo MD   Stopped at 02/23/21 3972  latanoprost (XALATAN) 0.005 % ophthalmic solution 1 Drop  1 Drop Both Eyes QHS Selina Chow MD      
 levoFLOXacin (LEVAQUIN) 500 mg in D5W IVPB  500 mg IntraVENous Q48H Selina Chow MD   Stopped at 02/23/21 0419  
 [START ON 2/24/2021] vancomycin (VANCOCIN) 750 mg in 0.9% sodium chloride 250 mL (VIAL-MATE)  750 mg IntraVENous Q72H Selina Chow MD      
 Vancomycin- dosed by pharmacy  1 Each Other Rx Dosing/Monitoring Selina Chow MD      
 NOREPINephrine (LEVOPHED) 8 mg in 0.9% NS 250ml infusion  0.5-16 mcg/min IntraVENous TITRATE Allyssa Coronel,  11.3 mL/hr at 02/23/21 0423 6 mcg/min at 02/23/21 0423 Current Outpatient Medications Medication Sig Dispense Refill  Lactobac. rhamnosus GG-inulin ([place] 13) 10 billion cell -200 mg cap Take  by mouth.  alum-mag hydroxide-simeth (MYLANTA) 200-200-20 mg/5 mL susp Take 30 mL by mouth every four (4) hours as needed for Indigestion.  doxycycline (ADOXA) 100 mg tablet Take 1 Tab by mouth daily. 30 Tab 1  
 amLODIPine (NORVASC) 5 mg tablet Take 1 Tab by mouth daily. 30 Tab 0  
 vancomycin 750 mg 750 mg 750 mg by IntraVENous route every seventy-two (72) hours. 1 Dose 0  
 loratadine (CLARITIN) 10 mg tablet Take 10 mg by mouth daily.  apixaban (ELIQUIS) 2.5 mg tablet Take 2.5 mg by mouth two (2) times a day.  melatonin 5 mg cap capsule Take 5 mg by mouth as needed.  atenolol (TENORMIN) 25 mg tablet Take 50 mg by mouth every evening.  bimatoprost (LUMIGAN) 0.03 % ophthalmic drops Administer 1 drop to both eyes every evening.  niacin (NIASPAN) 1,000 mg Tb24 tab Take 1,000 mg by mouth nightly.  multivitamin (ONE A DAY) tablet Take 1 Tab by mouth daily.  calcium-cholecalciferol, d3, (CALCIUM 600 + D) 600-125 mg-unit Tab Take  by mouth.  acetaminophen (TYLENOL) 120 mg suppository Insert 650 mg into rectum every four (4) hours as needed for Fever.  bisacodyL (Dulcolax, bisacodyl,) 10 mg supp Insert 10 mg into rectum.  maltodextrin (FIBER POWDER PO) Take  by mouth. 1 tsp mix in 8 oz juice  magnesium hydroxide ([place] Milk of Magnesia) 400 mg/5 mL suspension Take 30 mL by mouth daily as needed for Constipation.  traMADoL (ULTRAM) 50 mg tablet Take 50 mg by mouth every six (6) hours as needed for Pain.  ondansetron hcl (Zofran) 4 mg tablet Take 4 mg by mouth every eight (8) hours as needed for Nausea or Vomiting.  acetaminophen (TYLENOL) 325 mg tablet Take 3 Tabs by mouth every six (6) hours as needed for Pain. 20 Tab 0 Past History Past Medical History: 
Past Medical History:  
Diagnosis Date  ARF (acute renal failure) ([address] 6/3/2013  Arrhythmia A-Fib  Arthritis  Atrial fibrillation ([address]  Atrial fibrillation ([address]  Cellulitis  Chronic ulcer of right foot ([address]  CKD (chronic kidney disease) Stage III  Diabetes ([address]  Heart failure ([address]  Hypertension  Hypokalemia  Lymph edema  MRSA (methicillin resistant staph aureus) culture positive  Osteomyelitis ([address]  Traumatic amputation of right great toe ([address]  Unspecified sleep apnea   
 recently notified doesn't need c-pap Past Surgical History: 
Past Surgical History:  
Procedure Laterality Date  HX CATARACT REMOVAL    
 HX ORTHOPAEDIC    
 bilateral knee replacments  HX ORTHOPAEDIC  2017  
 betancourt procedure  IR INSERT TUNL CVC W/O PORT OVER 5 YR  1/26/2021 Family History: 
Family History Problem Relation Age of Onset  Cancer Mother  Heart Disease Father  Cancer Sister  Hypertension Sister Social History: 
Social History Tobacco Use  
  Smoking status: Never Smoker  Smokeless tobacco: Never Used Substance Use Topics  Alcohol use: No  
 Drug use: No  
 
 
Allergies: Allergies Allergen Reactions  Ambien [Zolpidem] Other (comments) Hallucinations Review of Systems Review of Systems Constitutional: Negative for fever. Respiratory: Positive for cough and shortness of breath. Cardiovascular: Negative for chest pain. Gastrointestinal: Negative for abdominal pain, nausea and vomiting. All other systems reviewed and are negative. Physical Exam  
 
Vitals:  
 02/23/21 0530 02/23/21 0617 02/23/21 0630 02/23/21 1071 BP: (!) 97/55 125/73 121/69 (!) 92/57 Pulse: 72 79 80 74 Resp: 26 20 26 (!) 32 Temp:      
SpO2: 100% 94% 94% 98% Weight:      
 
Physical Exam 
 
Nursing notes and vital signs reviewed Constitutional: Chronically ill-appearing, moderate distress Head: Normocephalic, Atraumatic Eyes: EOMI Neck: Supple Cardiovascular: Regular rate and rhythm, no murmurs, rubs, or gallops Chest: Tachypnea with normal chest excursion bilaterally Lungs: Diffuse crackles, right > left, frequent coughing Abdomen: Soft, non tender, non distended, normoactive bowel sounds Back: No evidence of trauma or deformity Extremities: No evidence of trauma or deformity, no LE edema Skin: Warm and dry, normal cap refill, 2+ pitting edema bilaterally with chronic wound to the right lower extremity. Neuro: Alert and appropriate, normal speech, strength and sensation full and symmetric bilaterally, normal gait, normal coordination Psychiatric: Normal mood and affect Diagnostic Study Results Labs - Recent Results (from the past 12 hour(s)) CBC WITH AUTOMATED DIFF Collection Time: 02/22/21 10:11 PM  
Result Value Ref Range WBC 8.8 4.6 - 13.2 K/uL  
 RBC 2.32 (L) 4.70 - 5.50 M/uL HGB 7.1 (L) 13.0 - 16.0 g/dL HCT 22.0 (L) 36.0 - 48.0 %  MCV 94.8 74.0 - 97.0 FL  
 MCH 30.6 24.0 - 34.0 PG  
 MCHC 32.3 31.0 - 37.0 g/dL RDW 21.6 (H) 11.6 - 14.5 % PLATELET 84 (L) 531 - 420 K/uL MPV 8.7 (L) 9.2 - 11.8 FL  
 NEUTROPHILS 76 (H) 40 - 73 % LYMPHOCYTES 15 (L) 21 - 52 % MONOCYTES 7 3 - 10 % EOSINOPHILS 2 0 - 5 % BASOPHILS 0 0 - 2 %  
 ABS. NEUTROPHILS 6.7 1.8 - 8.0 K/UL  
 ABS. LYMPHOCYTES 1.3 0.9 - 3.6 K/UL  
 ABS. MONOCYTES 0.6 0.05 - 1.2 K/UL  
 ABS. EOSINOPHILS 0.2 0.0 - 0.4 K/UL  
 ABS. BASOPHILS 0.0 0.0 - 0.1 K/UL  
 DF AUTOMATED METABOLIC PANEL, COMPREHENSIVE Collection Time: 02/22/21 10:11 PM  
Result Value Ref Range Sodium 149 (H) 136 - 145 mmol/L Potassium 3.7 3.5 - 5.5 mmol/L Chloride 121 (H) 100 - 111 mmol/L  
 CO2 17 (L) 21 - 32 mmol/L Anion gap 11 3.0 - 18 mmol/L Glucose 98 74 - 99 mg/dL BUN 75 (H) 7.0 - 18 MG/DL Creatinine 3.37 (H) 0.6 - 1.3 MG/DL  
 BUN/Creatinine ratio 22 (H) 12 - 20 GFR est AA 21 (L) >60 ml/min/1.73m2 GFR est non-AA 18 (L) >60 ml/min/1.73m2 Calcium 8.1 (L) 8.5 - 10.1 MG/DL Bilirubin, total 0.6 0.2 - 1.0 MG/DL  
 ALT (SGPT) 14 (L) 16 - 61 U/L  
 AST (SGOT) 23 10 - 38 U/L Alk. phosphatase 88 45 - 117 U/L Protein, total 6.0 (L) 6.4 - 8.2 g/dL Albumin 2.4 (L) 3.4 - 5.0 g/dL Globulin 3.6 2.0 - 4.0 g/dL A-G Ratio 0.7 (L) 0.8 - 1.7 CARDIAC PANEL,(CK, CKMB & TROPONIN) Collection Time: 02/22/21 10:11 PM  
Result Value Ref Range CK - MB 9.2 (H) <3.6 ng/ml CK-MB Index 14.4 (H) 0.0 - 4.0 % CK 64 39 - 308 U/L Troponin-I, QT <0.02 0.0 - 0.045 NG/ML  
NT-PRO BNP Collection Time: 02/22/21 10:11 PM  
Result Value Ref Range NT pro-BNP 19,512 (H) 0 - 1,800 PG/ML  
D DIMER Collection Time: 02/22/21 10:11 PM  
Result Value Ref Range D DIMER 2.71 (H) <0.46 ug/ml(FEU) FIBRINOGEN Collection Time: 02/22/21 10:11 PM  
Result Value Ref Range Fibrinogen 530 (H) 210 - 451 mg/dL EKG, 12 LEAD, INITIAL Collection Time: 02/22/21 10:22 PM  
Result Value Ref Range  Ventricular Rate 68 BPM  
  Atrial Rate 47 BPM  
 QRS Duration 102 ms  
 Q-T Interval 448 ms  
 QTC Calculation (Bezet) 476 ms  
 Calculated R Axis -52 degrees  
 Calculated T Axis 37 degrees  
 Diagnosis    
  Atrial fibrillation 
Left axis deviation 
Incomplete right bundle branch block 
Nonspecific ST and T wave abnormality , probably digitalis effect 
Prolonged QT 
Abnormal ECG 
When compared with ECG of 13-JAN-2021 10:10, 
Vent. rate has decreased BY  35 BPM 
Left posterior fascicular block is no longer present 
QRS voltage has decreased 
Nonspecific T wave abnormality has replaced inverted T waves in Inferior  
leads 
  
POC G3  
 Collection Time: 02/22/21 10:30 PM  
Result Value Ref Range  
 Device: Non rebreather    
 Flow rate (POC) 15 L/M  
 FIO2 (POC) 1.0 %  
 pH (POC) 7.30 (L) 7.35 - 7.45    
 pCO2 (POC) 30.2 (L) 35.0 - 45.0 MMHG  
 pO2 (POC) 75 (L) 80 - 100 MMHG  
 HCO3 (POC) 15.0 (L) 22 - 26 MMOL/L  
 sO2 (POC) 94 92 - 97 %  
 Base deficit (POC) 11 mmol/L  
 Allens test (POC) N/A    
 Total resp. rate 28    
 Site RIGHT RADIAL    
 Patient temp. 97.8    
 Specimen type (POC) ARTERIAL    
 Performed by Elisha Robins   
POC LACTIC ACID  
 Collection Time: 02/22/21 10:32 PM  
Result Value Ref Range  
 Lactic Acid (POC) 1.53 0.40 - 2.00 mmol/L  
COVID-19 RAPID TEST  
 Collection Time: 02/23/21 12:52 AM  
Result Value Ref Range  
 Specimen source Nasopharyngeal    
 COVID-19 rapid test Not detected NOTD    
CBC W/O DIFF  
 Collection Time: 02/23/21  4:48 AM  
Result Value Ref Range  
 WBC 4.2 (L) 4.6 - 13.2 K/uL  
 RBC 2.36 (L) 4.70 - 5.50 M/uL  
 HGB 7.0 (L) 13.0 - 16.0 g/dL  
 HCT 22.6 (L) 36.0 - 48.0 %  
 MCV 95.8 74.0 - 97.0 FL  
 MCH 29.7 24.0 - 34.0 PG  
 MCHC 31.0 31.0 - 37.0 g/dL  
 RDW 22.0 (H) 11.6 - 14.5 %  
 PLATELET 76 (L) 135 - 420 K/uL  
 MPV 8.3 (L) 9.2 - 11.8 FL  
VANCOMYCIN, RANDOM  
 Collection Time: 02/23/21  4:48 AM  
Result Value Ref Range  
 Vancomycin, random 14.7 5.0 - 40.0 UG/ML  
 CARDIAC PANEL,(CK, CKMB & TROPONIN) Collection Time: 02/23/21  4:48 AM  
Result Value Ref Range CK - MB 7.0 (H) <3.6 ng/ml CK-MB Index 12.1 (H) 0.0 - 4.0 % CK 58 39 - 308 U/L Troponin-I, QT <0.02 0.0 - 7.511 NG/ML  
METABOLIC PANEL, COMPREHENSIVE Collection Time: 02/23/21  4:48 AM  
Result Value Ref Range Sodium 150 (H) 136 - 145 mmol/L Potassium 3.6 3.5 - 5.5 mmol/L Chloride 121 (H) 100 - 111 mmol/L  
 CO2 14 (L) 21 - 32 mmol/L Anion gap 15 3.0 - 18 mmol/L Glucose 107 (H) 74 - 99 mg/dL BUN 75 (H) 7.0 - 18 MG/DL Creatinine 3.35 (H) 0.6 - 1.3 MG/DL  
 BUN/Creatinine ratio 22 (H) 12 - 20 GFR est AA 22 (L) >60 ml/min/1.73m2 GFR est non-AA 18 (L) >60 ml/min/1.73m2 Calcium 7.7 (L) 8.5 - 10.1 MG/DL Bilirubin, total 0.6 0.2 - 1.0 MG/DL  
 ALT (SGPT) 12 (L) 16 - 61 U/L  
 AST (SGOT) 14 10 - 38 U/L Alk. phosphatase 82 45 - 117 U/L Protein, total 5.5 (L) 6.4 - 8.2 g/dL Albumin 2.2 (L) 3.4 - 5.0 g/dL Globulin 3.3 2.0 - 4.0 g/dL A-G Ratio 0.7 (L) 0.8 - 1.7 URINALYSIS W/ RFLX MICROSCOPIC Collection Time: 02/23/21  5:24 AM  
Result Value Ref Range Color BLACK Appearance CLOUDY Specific gravity 1.015 1.003 - 1.030    
 pH (UA) 5.0 5.0 - 8.0 Protein 100 (A) NEG mg/dL Glucose Negative NEG mg/dL Ketone Negative NEG mg/dL Bilirubin Negative NEG Blood LARGE (A) NEG Urobilinogen 0.2 0.2 - 1.0 EU/dL Nitrites Negative NEG Leukocyte Esterase MODERATE (A) NEG URINE MICROSCOPIC ONLY Collection Time: 02/23/21  5:24 AM  
Result Value Ref Range WBC TOO NUMEROUS TO COUNT 0 - 5 /hpf  
 RBC TOO NUMEROUS TO COUNT 0 - 5 /hpf Epithelial cells FEW 0 - 5 /lpf Bacteria 2+ (A) NEG /hpf Amorphous Crystals 4+ (A) NEG Other:    
  MACRO DONE ON SPUN URINE DUE TO APPEARANCE AND COLOR OF URINE Radiologic Studies -  
CT ABD PELV WO CONT Final Result Anasarca. Cholelithiasis. Andujar catheter in place.   
  
_______________  
  
  
  
  
  
  
  
CT CHEST WO CONT Final Result Bilateral multifocal pneumonia, moderate to severe by imaging.   
  
_______________  
  
  
  
  
  
  
  
XR CHEST PORT Final Result Bilateral airspace and interstitial abnormality most suggestive of pulmonary  
edema, mild-to-moderate by imaging. The apparent asymmetry could be artifactual due to right posterior rotation. PA  
and lateral chest radiography could potentially clarify as an asymmetric  
abnormality would be more suggestive of infection or another cause of airspace  
abnormality. _______________ CT Results  (Last 48 hours) 02/23/21 0609  CT ABD PELV WO CONT Final result Impression: Anasarca. Cholelithiasis. Andujar catheter in place.   
   
_______________ Narrative:  EXAM: CT of the abdomen and pelvis without intravenous contrast.  
   
INDICATION: Abdominal pain. COMPARISON:  None available. DOSE REDUCTION AND DICOM INFORMATION: One or more dose reduction techniques were  
used on this CT: automated exposure control, adjustment of the mAs and/or kVp  
according to patient size, and iterative reconstruction techniques. The  
specific techniques used on this CT exam have been documented in the patient's  
electronic medical record. Digital Imaging and Communications in Medicine (DICOM) format image data are available to nonaffiliated external healthcare  
facilities or entities on a secure, media free, reciprocally searchable basis  
with patient authorization for at least a 12-month period after this study. _______________ FINDINGS:  
   
     IMAGE QUALITY:  Imaging of the solid organs and the bowel is limited by  
noncontrast technique. CHEST BASE: Multifocal pneumonia. Multichamber cardiac enlargement. LIVER: Unremarkable. GALLBLADDER: Cholelithiasis. BILE DUCTS: Unremarkable. PANCREAS: Unremarkable. SPLEEN: Unremarkable. GASTROINTESTINAL TRACT AND PERITONEAL CAVITY:  
   
          Lower esophagus:  Unremarkable. Hiatal hernia:  No.  
          Diverticulosis:  None significant. Ascites:  No.  
          Bowel obstruction:  No.  
          Pneumoperitoneum:  No.  
          Appendix/region:  Unremarkable. ADRENALS:   
   
          Right:  Unremarkable. Left:  Unremarkable. KIDNEYS, URETERS, BLADDER:  
   
          Calculi:  None detected. Right:  Unremarkable. Left:  Unremarkable. Bladder:  Decompressed by Andujar catheter. VASCULATURE: Aortoiliac atherosclerosis without AAA. LYMPH NODES: Unremarkable. REPRODUCTIVE ORGANS: Unremarkable. SUPERFICIAL SOFT TISSUES: Anasarca. BONES:  Unremarkable.  
   
_______________  
   
  
 02/23/21 3665  CT CHEST WO CONT Final result Impression:     
Bilateral multifocal pneumonia, moderate to severe by imaging.   
   
_______________ Narrative:  EXAM: CT of the chest without intravenous contrast.  
   
INDICATION:  \"resp failure. \"  
   
COMPARISON:  No prior CT is available for direct comparison. DOSE REDUCTION AND DICOM INFORMATION: One or more dose reduction techniques were  
used on this CT: automated exposure control, adjustment of the mAs and/or kVp  
according to patient size, and iterative reconstruction techniques. The  
specific techniques used on this CT exam have been documented in the patient's  
electronic medical record. Digital Imaging and Communications in Medicine (DICOM) format image data are available to nonaffiliated external healthcare  
facilities or entities on a secure, media free, reciprocally searchable basis with patient authorization for at least a 12-month period after this study. _______________ FINDINGS:  
   
     IMAGE QUALITY:  Examination of the mediastinal/hilar structures is limited  
by the lack of intravenous contrast.  
   
     CENTRAL AIRWAYS: Unremarkable. LUNGS AND PLEURAL SPACE: There is moderate patchy bilateral central  
predominant consolidative abnormality and marked dense bilateral lower lobe  
consolidation. There are tiny dependent pleural effusions. HEART AND MEDIASTINUM/RAMON: Multichamber cardiac enlargement. Coronary  
artery calcific atherosclerosis. PARTIALLY IMAGED UPPER ABDOMEN:  Cholelithiasis. PARTIALLY IMAGED NECK BASE:   Small left thyroid nodule. SUPERFICIAL SOFT TISSUES: Anasarca. BONES:  Unremarkable.  
   
_______________ CXR Results  (Last 48 hours) 02/22/21 2231  XR CHEST PORT Final result Impression:     
Bilateral airspace and interstitial abnormality most suggestive of pulmonary  
edema, mild-to-moderate by imaging. The apparent asymmetry could be artifactual due to right posterior rotation. PA  
and lateral chest radiography could potentially clarify as an asymmetric  
abnormality would be more suggestive of infection or another cause of airspace  
abnormality. _______________ Narrative:  EXAM: Portable semiupright chest radiograph. INDICATION: \"SOB. \"  
   
COMPARISON: January 13, 2021  
   
_______________ FINDINGS: The patient is right posterior rotator, which causes superimposition  
artifact in the right lung. There is bilateral interstitial prominence with an  
appearance of pulmonary edema that is more prominent in the right, favored to be  
an artifact of rotation as opposed to asymmetric pulmonary disease. There is  
low-grade hazy opacity in the bilateral perihilar and infrahilar regions likely reflects alveolar component to the edema, however, superimposed infection or  
other airspace abnormality remains a possibility. The findings are new. There is a possible right basal effusion versus artifact of superimposition. Right internal jugular central venous catheter has a standard appearance. There  
is an enlarged cardiac silhouette versus artifact of portable technique. _______________ Medications given in the ED- Medications NOREPINephrine (LEVOPHED) 8 mg in 0.9% NS 250ml infusion (6 mcg/min IntraVENous New Bag 2/23/21 0423)  
sodium chloride (NS) flush 5-40 mL (0 mL IntraVENous Held 2/23/21 0026)  
sodium chloride (NS) flush 5-40 mL (has no administration in time range)  
acetaminophen (TYLENOL) tablet 650 mg (has no administration in time range) Or  
acetaminophen (TYLENOL) suppository 650 mg (has no administration in time range)  
polyethylene glycol (MIRALAX) packet 17 g (has no administration in time range)  
promethazine (PHENERGAN) tablet 12.5 mg (has no administration in time range) Or  
ondansetron (ZOFRAN) injection 4 mg (has no administration in time range)  
piperacillin-tazobactam (ZOSYN) 3.375 g in 0.9% sodium chloride (MBP/ADV) 100 mL MBP (0 g IntraVENous IV Completed 2/23/21 0215)  
latanoprost (XALATAN) 0.005 % ophthalmic solution 1 Drop (has no administration in time range) levoFLOXacin (LEVAQUIN) 500 mg in D5W IVPB (0 mg IntraVENous IV Completed 2/23/21 7423)  
vancomycin (VANCOCIN) 750 mg in 0.9% sodium chloride 250 mL (VIAL-MATE) (has no administration in time range) Vancomycin- dosed by pharmacy (has no administration in time range)  
albumin human 5% (BUMINATE) solution 12.5 g (0 g IntraVENous IV Completed 2/22/21 0994) furosemide (LASIX) injection 40 mg (40 mg IntraVENous Given 2/23/21 0142) potassium chloride 10 mEq in 100 ml IVPB (0 mEq IntraVENous IV Completed 2/23/21 0422) lidocaine (URO-JET/ GLYDO) 2 % jelly ( Urethral Given 2/23/21 0400) Medical Decision Making I am the first provider for this patient. I reviewed the vital signs, available nursing notes, past medical history, past surgical history, family history and social history. Vital Signs-Reviewed the patient's vital signs. Pulse Oximetry Analysis - 82% on room air, not hypoxic Cardiac Monitor: 
Rate: 74 bpm 
Rhythm: Atrial fibrillation EKG interpretation: (Preliminary) EKG read by Dr. Concha Murrieta at [phone] Atrial fibrillation with PVCs, nonspecific ST abnormality, rate 66, , QTc 488. No acute ischemia. No global changes as compared to previous. Records Reviewed: Nursing Notes and Previous electrocardiograms Provider Notes (Medical Decision Making): Ngoc Hayes is a 79 y.o. male PMHX of CHF, acute renal failure, A. fib, chronic ulcer of the right foot with osteomyelitis presenting acutely short of breath from [address] [place] [address] after IV fluid hydration efforts at the facility to correct his BUN/creatinine. On exam the patient is clearly fluid overloaded with crackles throughout, right greater than left, 2+ pitting edema and tachypnea. Patient was initially on nonrebreather. However ABG was done which demonstrated hypoxia. Patient was placed on high flow with some improvement in his saturation. Blood pressure in the 71R systolic. Concern for fluid overload, does not meet SIRS criteria. Levophed initially held. Fluids were held as there was a concern for worsening respiratory distress given his fluid overload state so a trial of albumin was done however this did not improve his symptoms or his blood pressure. Levophed was then initiated. 0006 patient called off his high flow oxygen and was saturating at 82% on room air. I replaced the high flow and gave him nonrebreather. Page respiratory for BiPAP. Patient with improvement on BiPAP and Levophed drip. Pains awake, alert and answering questions appropriately. 0010 spoke with Dr. Raghu Montes, discussed case and plan of care, she accepts the patient to ICU. Procedures: 
Bedside US Performed by: Tomy Johnson DO Authorized by: Tomy Johnson,  Type of procedure: Focused cardiac (Echo) Left leg: Interpretation:  No sonographic evidence of significant pericardial effusion ED Course: As above Diagnosis and Disposition Critical Care Time: 0010 AM 
I have spent 90 minutes of critical care time involved in lab review, consultations with specialist, family decision-making, and documentation. During this entire length of time I was immediately available to the patient. Critical Care: The reason for providing this level of medical care for this critically ill patient was due a critical illness that impaired one or more vital organ systems such that there was a high probability of imminent or life threatening deterioration in the patients condition. This care involved high complexity decision making to assess, manipulate, and support vital system functions, to treat this degreee vital organ system failure and to prevent further life threatening deterioration of the patients condition. Core Measures: 
For Hospitalized Patients: 
 
1. Hospitalization Decision Time: The decision to hospitalize the patient was made by Dr. Lina Gotti at [phone] on 2/22/2021 2. Aspirin: Aspirin was not given because the patient did not present with a stroke at the time of their Emergency Department evaluation 7:10 AM  Patient is being admitted to the hospital by Dr. Tay Roberts. The results of their tests and reasons for their admission have been discussed with them and/or available family. They convey agreement and understanding for the need to be admitted and for their admission diagnosis.    
 
CONDITIONS ON ADMISSION: 
 Sepsis is not present at the time of admission. Deep Vein Thrombosis is not present at the time of admission. Thrombosis is not present at the time of admission. Urinary Tract Infection is not present at the time of admission. Pneumonia may be present at the time of admission. MRSA is present at the time of admission. Wound infection is present at the time of admission. Pressure Ulcer is not present at the time of admission. CLINICAL IMPRESSION: 
 
1. Hypoxia 2. Acute respiratory distress 3. Hypervolemia, unspecified hypervolemia type   
  
 
_______________________________ Please note that this dictation was completed with [employer], the computer voice recognition software. Quite often unanticipated grammatical, syntax, homophones, and other interpretive errors are inadvertently transcribed by the computer software. Please disregard these errors. Please excuse any errors that have escaped final proofreading.

## 2021-02-23 NOTE — ED TRIAGE NOTES
Patient arrives from [address] [place] [address] c/o SOB. Patient with CHF. Was given IV fluids today to fix his BUN and crea. Tonight patient became SOB with swollen lower extremities. Upon EMS arrival patient was 82% on room air. Patient placed on 15L NRB and is sating 96%. Patient with grey sputum and wet cough. Lung sounds wet. Only other complaint is buttock pain. AOX4.

## 2021-02-23 NOTE — H&P
History & Physical 
 
Patient: Cralos Frazier. MRN: [id number]  [facility]: [id number] YOB: 1940  Age: 79 y.o. Sex: male DOA: 2/22/2021 Primary Care Provider:  Manjeet Francois MD 
 
 
Assessment/Plan Patient Active Problem List  
Diagnosis Code  Cellulitis and abscess of leg, except foot L03.119, L02.419  
 HTN (hypertension) I10  
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  
 CHF (congestive heart failure) ([facility]) I50.9  Acute hypoxemic respiratory failure ([facility]) J96.01  
 Chronic atrial fibrillation ([facility]) I48.20  Thrombocytopenia ([facility] D69.6  Anemia D64.9  Acute urinary retention R358  
 
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 Neuromildly agitated and attempting to pull off mask Pulmonaryacute hypoxemic respiratory failure possibly due to hospital-acquired pneumonia. He was on high flow nasal cannula then transitioned to BiPAP. I have ordered a noncontrast chest CT to better evaluate. Consider V/Q scan depending on CT chest result. CVchronic atrial fibrillation with hypotension currently on Levophed. His proBNP is elevated but his weight is actually down about 10 pounds from discharge so CHF exacerbation is less likely. I will hold all antihypertensives for now. Lasix 40 mg IV given once. Trend troponins. Echocardiogram ordered. GIno issues RenalCKD stage III with acute urinary obstruction possibly due to clot present given he is on Eliquis and thrombocytopenic. Urology consulted after multiple attempts to place a three-way Andujar did not work. Appreciate Dr. Jessy Mcclendon coming into place catheter tonight. Hemeanemia and thrombocytopenia. Transfuse for hemoglobin less than 7. I have ordered D-dimer, fibrinogen, and haptoglobin to evaluate for hemolysis. I will stop Eliquis for now as he was on for chronic atrial fibrillation. Occult blood of stool ordered. Roselyn is already on Zosyn and vancomycin for chronic osteomyelitis of the foot. I have added Levaquin to broaden coverage. Infectious disease consulted. Rapid Covid is negative and confirmatory test is pending. Continue isolation for now. I will check him for C. difficile as he has had some diarrhea with antibiotics. Follow-up blood cultures. Endono issues F/E/N albumin if needed/replete as needed/NPO due to respiratory distress Full code Palliative care consult placed due to multiple ongoing medical issues to address code status He is critically ill with a high risk of requiring intubation or clinically decompensating. Prophylaxis: SCDs, protonix IV, heparin SQ Estimated length of stay : 6-7 nights Reba Cheadle, MD 
Nocturnist 
 
Critical Care Time 1117-1014 90 minutes of critical care time spent in the direct evaluation and treatment of this high risk patient. The reason for providing this level of medical care for this critically ill patient was due a critical illness that impaired one or more vital organ systems such that there was a high probability of imminent or life threatening deterioration in the patients condition. This care involved high complexity decision making to assess, manipulate, and support vital system functions, to treat this degreee vital organ system failure and to prevent further life threatening deterioration of the patients condition. 
---------------------------------------------------------------------------------------------------------------------------------------------------------------------------- 
CC: Transferred from [address] due to difficulty breathing HPI:  
 
Felicia Byrd is a 79 y.o. 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 According to his wife, his daughter talked with him on the phone earlier today and noticed that he was short of breath so requested that [address] [place] [address] send him to the emergency department. He was placed on high flow oxygen initially and then BiPAP after that. History is unobtainable from the patient due to BiPAP mask in place in respiratory distress. Past Medical History:  
Diagnosis Date  ARF (acute renal failure) ([address] 6/3/2013  Arrhythmia A-Fib  Arthritis  Atrial fibrillation ([address]  Atrial fibrillation ([address]  Cellulitis  Chronic ulcer of right foot ([address]  CKD (chronic kidney disease) Stage III  Diabetes ([facility]  Heart failure ([facility]  Hypertension  Hypokalemia  Lymph edema  MRSA (methicillin resistant staph aureus) culture positive  Osteomyelitis ([facility]  Traumatic amputation of right great toe ([facility]  Unspecified sleep apnea   
 recently notified doesn't need c-pap Past Surgical History:  
Procedure Laterality Date  HX CATARACT REMOVAL    
 HX ORTHOPAEDIC    
 bilateral knee replacments  HX ORTHOPAEDIC  2017  
 betancourt procedure  IR INSERT TUNL CVC W/O PORT OVER 5 YR  1/26/2021 Family History Problem Relation Age of Onset  Cancer Mother  Heart Disease Father  Cancer Sister  Hypertension Sister Social History Socioeconomic History  Marital status:  Spouse name: Not on file  Number of children: Not on file  Years of education: Not on file  Highest education level: Not on file Tobacco Use  Smoking status: Never Smoker  Smokeless tobacco: Never Used Substance and Sexual Activity  Alcohol use: No  
 Drug use: No  
 
 
Prior to Admission medications Medication Sig Start Date End Date Taking? Authorizing Provider Lactobac. rhamnosus GG-inulin ([place]) 10 billion cell -200 mg cap Take  by mouth. Yes Other, MD Ashley  
alum-mag hydroxide-simeth (MYLANTA) 200-200-20 mg/5 mL susp Take 30 mL by mouth every four (4) hours as needed for Indigestion. Yes Other, MD Ashley  
doxycycline (ADOXA) 100 mg tablet Take 1 Tab by mouth daily. 2/17/21  Yes Zena Anne MD  
amLODIPine (NORVASC) 5 mg tablet Take 1 Tab by mouth daily. 1/27/21  Yes Sohail Cervantes MD  
vancomycin 750 mg 750 mg 750 mg by IntraVENous route every seventy-two (72) hours. 1/26/21  Yes Sohail eCrvantes MD  
loratadine (CLARITIN) 10 mg tablet Take 10 mg by mouth daily. Yes Provider, Historical  
apixaban (ELIQUIS) 2.5 mg tablet Take 2.5 mg by mouth two (2) times a day. Yes Provider, Historical  
melatonin 5 mg cap capsule Take 5 mg by mouth as needed.    Yes Provider, Historical  
 atenolol (TENORMIN) 25 mg tablet Take 50 mg by mouth every evening. Yes Asha, MD Ashley  
bimatoprost (LUMIGAN) 0.03 % ophthalmic drops Administer 1 drop to both eyes every evening. Yes Asha, MD Ashley  
niacin (NIASPAN) 1,000 mg Tb24 tab Take 1,000 mg by mouth nightly. Yes Provider, Historical  
multivitamin (ONE A DAY) tablet Take 1 Tab by mouth daily. Yes Provider, Historical  
calcium-cholecalciferol, d3, (CALCIUM 600 + D) 600-125 mg-unit Tab Take  by mouth. Yes Provider, Historical  
acetaminophen (TYLENOL) 120 mg suppository Insert 650 mg into rectum every four (4) hours as needed for Fever. Other, MD Ashley  
bisacodyL (Dulcolax, bisacodyl,) 10 mg supp Insert 10 mg into rectum. Other, MD Ashley  
maltodextrin (FIBER POWDER PO) Take  by mouth. 1 tsp mix in 8 oz juice    OtherAshley MD  
magnesium hydroxide ([place] Milk of Magnesia) 400 mg/5 mL suspension Take 30 mL by mouth daily as needed for Constipation. Asha, MD Ashley  
traMADoL (ULTRAM) 50 mg tablet Take 50 mg by mouth every six (6) hours as needed for Pain. Ashley Barry MD  
ondansetron hcl (Zofran) 4 mg tablet Take 4 mg by mouth every eight (8) hours as needed for Nausea or Vomiting. Ashley Barry MD  
acetaminophen (TYLENOL) 325 mg tablet Take 3 Tabs by mouth every six (6) hours as needed for Pain. 21   Jason Gramajo MD  
 
 
Allergies Allergen Reactions  Ambien [Zolpidem] Other (comments) Hallucinations Review of Systems Unable to obtain due to patient condition Physical Exam:  
 
Physical Exam: 
Visit Vitals BP (!) 103/48 Pulse 72 Temp 97.8 °F (36.6 °C) Resp 26 Wt 99.8 kg (220 lb) SpO2 97% BMI 29.83 kg/m² O2 Flow Rate (L/min): 50 l/min O2 Device: BIPAP Temp (24hrs), Av.8 °F (36.6 °C), Min:97.8 °F (36.6 °C), Max:97.8 °F (36.6 °C) 
   190 -  0700 In: 250 [I.V.:250] Out: -    No intake/output data recorded. General:   Somnolent, opens eyes to voice, BiPAP mask in place. Head:  Normocephalic, without obvious abnormality, atraumatic. Eyes:  Conjunctivae/corneas clear, sclera anicteric. Neck: Supple, symmetrical, trachea midline. Lungs:    Coarse breath sounds right > left. No wheezing. Heart:  Irregular rate and rhythm, S1, S2 normal, no murmur, click, rub or gallop. Abdomen: Soft, non-tender. Bowel sounds normal. No masses,  No organomegaly. Extremities: Right foot with transmetatarsal amputation and sutures in place. No erythema or purulent drainage. Left lower extremity with 3+ pitting edema. Capillary refill normal.  
Pulses: 2+ and symmetric all extremities. Skin: Skin color pink, turgor normal. No rashes or lesions Neurologic: No focal motor or sensory deficit. Labs Reviewed: All lab results for the last 24 hours reviewed. Recent Results (from the past 24 hour(s)) METABOLIC PANEL, BASIC Collection Time: 02/22/21 12:30 PM  
Result Value Ref Range Sodium 150 (H) 136 - 145 mmol/L Potassium 3.3 (L) 3.5 - 5.5 mmol/L Chloride 121 (H) 100 - 111 mmol/L  
 CO2 21 21 - 32 mmol/L Anion gap 8 3.0 - 18 mmol/L Glucose 120 (H) 74 - 99 mg/dL BUN 74 (H) 7.0 - 18 MG/DL Creatinine 3.29 (H) 0.6 - 1.3 MG/DL  
 BUN/Creatinine ratio 22 (H) 12 - 20 GFR est AA 22 (L) >60 ml/min/1.73m2 GFR est non-AA 18 (L) >60 ml/min/1.73m2 Calcium 7.8 (L) 8.5 - 10.1 MG/DL  
HEPATIC FUNCTION PANEL Collection Time: 02/22/21 12:30 PM  
Result Value Ref Range Protein, total 5.6 (L) 6.4 - 8.2 g/dL Albumin 2.2 (L) 3.4 - 5.0 g/dL Globulin 3.4 2.0 - 4.0 g/dL A-G Ratio 0.6 (L) 0.8 - 1.7 Bilirubin, total 0.5 0.2 - 1.0 MG/DL Bilirubin, direct 0.2 0.0 - 0.2 MG/DL Alk. phosphatase 84 45 - 117 U/L  
 AST (SGOT) 13 10 - 38 U/L  
 ALT (SGPT) 14 (L) 16 - 61 U/L  
AMMONIA Collection Time: 02/22/21  2:25 PM  
Result Value Ref Range  Ammonia 11 11 - 32 UMOL/L  
 CBC WITH AUTOMATED DIFF Collection Time: 02/22/21 10:11 PM  
Result Value Ref Range WBC 8.8 4.6 - 13.2 K/uL  
 RBC 2.32 (L) 4.70 - 5.50 M/uL HGB 7.1 (L) 13.0 - 16.0 g/dL HCT 22.0 (L) 36.0 - 48.0 % MCV 94.8 74.0 - 97.0 FL  
 MCH 30.6 24.0 - 34.0 PG  
 MCHC 32.3 31.0 - 37.0 g/dL RDW 21.6 (H) 11.6 - 14.5 % PLATELET 84 (L) 798 - 420 K/uL MPV 8.7 (L) 9.2 - 11.8 FL  
 NEUTROPHILS 76 (H) 40 - 73 % LYMPHOCYTES 15 (L) 21 - 52 % MONOCYTES 7 3 - 10 % EOSINOPHILS 2 0 - 5 % BASOPHILS 0 0 - 2 %  
 ABS. NEUTROPHILS 6.7 1.8 - 8.0 K/UL  
 ABS. LYMPHOCYTES 1.3 0.9 - 3.6 K/UL  
 ABS. MONOCYTES 0.6 0.05 - 1.2 K/UL  
 ABS. EOSINOPHILS 0.2 0.0 - 0.4 K/UL  
 ABS. BASOPHILS 0.0 0.0 - 0.1 K/UL  
 DF AUTOMATED METABOLIC PANEL, COMPREHENSIVE Collection Time: 02/22/21 10:11 PM  
Result Value Ref Range Sodium 149 (H) 136 - 145 mmol/L Potassium 3.7 3.5 - 5.5 mmol/L Chloride 121 (H) 100 - 111 mmol/L  
 CO2 17 (L) 21 - 32 mmol/L Anion gap 11 3.0 - 18 mmol/L Glucose 98 74 - 99 mg/dL BUN 75 (H) 7.0 - 18 MG/DL Creatinine 3.37 (H) 0.6 - 1.3 MG/DL  
 BUN/Creatinine ratio 22 (H) 12 - 20 GFR est AA 21 (L) >60 ml/min/1.73m2 GFR est non-AA 18 (L) >60 ml/min/1.73m2 Calcium 8.1 (L) 8.5 - 10.1 MG/DL Bilirubin, total 0.6 0.2 - 1.0 MG/DL  
 ALT (SGPT) 14 (L) 16 - 61 U/L  
 AST (SGOT) 23 10 - 38 U/L Alk. phosphatase 88 45 - 117 U/L Protein, total 6.0 (L) 6.4 - 8.2 g/dL Albumin 2.4 (L) 3.4 - 5.0 g/dL Globulin 3.6 2.0 - 4.0 g/dL A-G Ratio 0.7 (L) 0.8 - 1.7 CARDIAC PANEL,(CK, CKMB & TROPONIN) Collection Time: 02/22/21 10:11 PM  
Result Value Ref Range CK - MB 9.2 (H) <3.6 ng/ml CK-MB Index 14.4 (H) 0.0 - 4.0 % CK 64 39 - 308 U/L Troponin-I, QT <0.02 0.0 - 0.045 NG/ML  
NT-PRO BNP Collection Time: 02/22/21 10:11 PM  
Result Value Ref Range NT pro-BNP 19,512 (H) 0 - 1,800 PG/ML  
D DIMER Collection Time: 02/22/21 10:11 PM  
Result Value Ref Range D DIMER 2.71 (H) <0.46 ug/ml(FEU) FIBRINOGEN Collection Time: 02/22/21 10:11 PM  
Result Value Ref Range Fibrinogen 530 (H) 210 - 451 mg/dL EKG, 12 LEAD, INITIAL Collection Time: 02/22/21 10:22 PM  
Result Value Ref Range Ventricular Rate 68 BPM  
 Atrial Rate 47 BPM  
 QRS Duration 102 ms Q-T Interval 448 ms QTC Calculation (Bezet) 476 ms Calculated R Axis -52 degrees Calculated T Axis 37 degrees Diagnosis Atrial fibrillation Left axis deviation Incomplete right bundle branch block Nonspecific ST and T wave abnormality , probably digitalis effect Prolonged QT Abnormal ECG When compared with ECG of 13-JAN-2021 10:10, 
Vent. rate has decreased BY  35 BPM 
Left posterior fascicular block is no longer present QRS voltage has decreased Nonspecific T wave abnormality has replaced inverted T waves in Inferior  
leads POC G3 Collection Time: 02/22/21 10:30 PM  
Result Value Ref Range Device: Non rebreather Flow rate (POC) 15 L/M  
 FIO2 (POC) 1.0 %  
 pH (POC) 7.30 (L) 7.35 - 7.45    
 pCO2 (POC) 30.2 (L) 35.0 - 45.0 MMHG  
 pO2 (POC) 75 (L) 80 - 100 MMHG  
 HCO3 (POC) 15.0 (L) 22 - 26 MMOL/L  
 sO2 (POC) 94 92 - 97 % Base deficit (POC) 11 mmol/L Allens test (POC) N/A Total resp. rate 28 Site RIGHT RADIAL Patient temp. 97.8 Specimen type (POC) ARTERIAL Performed by Odette Steward POC LACTIC ACID Collection Time: 02/22/21 10:32 PM  
Result Value Ref Range Lactic Acid (POC) 1.53 0.40 - 2.00 mmol/L  
COVID-19 RAPID TEST Collection Time: 02/23/21 12:52 AM  
Result Value Ref Range Specimen source Nasopharyngeal    
 COVID-19 rapid test Not detected NOTD Results XR CHEST PORT (Accession [id number]) (Order [id number]) Allergies    
Not Specified: Ambien [Zolpidem] Exam Information Status Exam Begun  Exam Ended Final [99] 2/22/2021 22:15 2/22/2021 10:31 PM [id number] 10:31 PM  
Result Information Status: Final result (Exam End: 2/22/2021 22:31) Provider Status: Open Study Result EXAM: Portable semiupright chest radiograph. 
  
INDICATION: \"SOB. \" 
  
COMPARISON: January 13, 2021 
  
_______________ 
  
FINDINGS: The patient is right posterior rotator, which causes superimposition 
artifact in the right lung. There is bilateral interstitial prominence with an 
appearance of pulmonary edema that is more prominent in the right, favored to be 
an artifact of rotation as opposed to asymmetric pulmonary disease. There is 
low-grade hazy opacity in the bilateral perihilar and infrahilar regions likely 
reflects alveolar component to the edema, however, superimposed infection or 
other airspace abnormality remains a possibility. The findings are new. 
  
There is a possible right basal effusion versus artifact of superimposition. 
  
Right internal jugular central venous catheter has a standard appearance. There 
is an enlarged cardiac silhouette versus artifact of portable technique. 
  
_______________ 
  
IMPRESSION 
  
Bilateral airspace and interstitial abnormality most suggestive of pulmonary 
edema, mild-to-moderate by imaging. 
  
The apparent asymmetry could be artifactual due to right posterior rotation. PA 
and lateral chest radiography could potentially clarify as an asymmetric 
abnormality would be more suggestive of infection or another cause of airspace 
abnormality.  
  
_______________

## 2021-02-23 NOTE — PROGRESS NOTES
Consulted by Dr. Abdeil Marie for Acute hypoxic respiratory failure and septic shock Came to see pt at bedside. RN informed me that pt is now comfort care Call as needed. Will not see pt at present.  
 
Concepcion Waldrop MD

## 2021-02-23 NOTE — ED NOTES
Dr. Alexandre Milligan at bedside to place catheter. 16F placed with clear urine output. Clear urine turned to brown urine with sediment. Moni Fowler and Dr. Alexandre Milligan at bedside. Urine sent to lab.

## 2021-02-23 NOTE — ED NOTES
Patient continues to have labored breathing. Family remains at bedside. Patient sats in 50% Patient on nc 5 LPM 
Daughter did oral care with mouth sponge

## 2021-02-23 NOTE — ED NOTES
Dr. Siri Raymond at bedside met with wife Dayday Mondragon and 2 daughters. They want patient to be on comfort care. lefophed stopped d/scott wrist restraints and removed nonrebreather as per family requested. Oral care given due to mouth dry and cracked

## 2021-02-23 NOTE — ED NOTES
Order for Albumin to be changed to:  
VTBI: 150mL Rate: 150mL/60 minutes Pharmacist with approval.

## 2021-02-23 NOTE — ED NOTES
Wife and daughter sat bedside provided with emotional support. Patient unresponsive now. But at times he does reach and grab. Labored mouth breathing. Will honor family request and provide comfort measures. Family provided with refreshments

## 2021-02-23 NOTE — ED NOTES
MD to bedside due to low O2 saturations. Patient pulled off Hi- Flow oxygen. Placed back on hi flow an don 15L NRB. Patient saturations slow to come up to normal. Respiratory paged to place patient on BiPAP.

## 2021-02-23 NOTE — PROGRESS NOTES
Patient Name: Patsy Dunlap. Age: 79 y.o. Sex:  male YOB: 1940 Medical Record Number: [id number] Antimicrobial  Vancomycin Indication SSTI Dose / Interval  
 
 
 
 
Current Antimicrobial Therapy (168h ago, onward) Ordered     Start Stop  
 02/23/21 0621  vancomycin (VANCOCIN) 750 mg in 0.9% sodium chloride 250 mL (VIAL-MATE)  750 mg,   IntraVENous,   EVERY 72 HOURS    
 02/24/21 0900 --  
 02/23/21 0229  levoFLOXacin (LEVAQUIN) 500 mg in D5W IVPB  500 mg,   IntraVENous,   EVERY 48 HOURS    
 02/23/21 0230 03/09/21 0229  
 02/23/21 0033  piperacillin-tazobactam (ZOSYN) 3.375 g in 0.9% sodium chloride (MBP/ADV) 100 mL MBP  3.375 g,   IntraVENous,   EVERY 6 HOURS    
 02/23/21 0033 03/04/21 0033 Last Level (if applicable) Lab Results Component Value Date/Time Reported dose: UNKNOWN 02/12/2021 10:48 AM  
 Reported dose time: UNKNOWN 02/12/2021 10:48 AM  
 Reported dose date UNKNOWN 02/12/2021 10:48 AM  
 
Vancomycin Lab Results Component Value Date/Time Vancomycin,trough 21.4 (HH) 02/12/2021 10:48 AM  
 Vancomycin, random 14.7 02/23/2021 04:48 AM  
  
Gentamicin No results found for: GENP, GENT, GENR] Tobramycin No results found for: TOBP, TOBT, TOBR Amikacin No results found for: Fina Vazquez, HEAVEN, Fadumo Pickering Cultures (7 most recent) GRAM STAIN Date Value Ref Range Status 01/15/2021 OCCASIONAL WBCS SEEN   Final  
01/15/2021 NO ORGANISMS SEEN   Final  
 
Culture result:  
Date Value Ref Range Status 01/15/2021 NO ANAEROBES ISOLATED   Final  
01/15/2021 (A)   Final  
 FEW * METHICILLIN RESISTANT STAPHYLOCOCCUS AUREUS *  
01/13/2021 NO GROWTH 6 DAYS   Final  
01/13/2021 NO GROWTH 6 DAYS   Final  
09/20/2017 NO GROWTH 6 DAYS   Final  
11/20/2014 NO GROWTH 1 DAY   Final  
11/17/2014 NO GROWTH 6 DAYS   Final  
11/17/2014 NO GROWTH 6 DAYS   Final  
  
Renal Overview (72 hr) Recent Labs  
  02/23/21 4488 21 
1230 BUN 75* 75* 74* CREA 3.35* 3.37* 3.29* CrCl (Current): CrCl cannot be calculated (Unknown ideal weight.). Lactic Acid (Sepsis Criteria: >2.0 mmol/L) Lab Results Component Value Date/Time Lactic acid 1.8 2014 04:28 PM  
  
Procalcitonin (0.10-0.49 NG/ML) No results found for: PCT Hepatic Overview (72 hr) Recent Labs  
  21 
1230 ALT 12* 14* 14* AP 82 88 84 Temp (24-hr Max) Temp (24hrs), Av.8 °F (36.6 °C), Min:97.8 °F (36.6 °C), Max:97.8 °F (36.6 °C) Hematology Recent Labs  
  21 
1327 WBC 4.2* 8.8 8.1 HGB 7.0* 7.1* 10.4* HCT 22.6* 22.0* 32.7*  
PLT 76* 84* 56* GRANS  --  76*  --   
ANEU  --  6.7  --   
 
  
DOT  Continuing previous regimen Notes  Patient previously on Vancomycin 750 mg IV q 72 hrs  random level requested; result 14.7 Discuss when next vancomycin level(s) should be obtained based on clinical factors and/or institution policy] Monitor renal function (urine output, BUN/SCr). Dose adjustments may be necessary with a significant change in renal function. Bethel Koo [facility] Clinical Pharmacist 
[phone]

## 2021-02-23 NOTE — PROGRESS NOTES
RESPIRATORY NOTE: 
   Called to ED to check patient, SPO2 84% maxed out on HFNC, applied 100% NRB mask over high flow, SPO2 increased to 94%, pt is lethargic, will answer questions, \"will you help me?\", will continue to monitor.

## 2021-02-23 NOTE — ED NOTES
Bedside report given to SLAVA Garcia. Patient resting on hi-flow. VSS. IVF infusing without difficulty. Bed low and locked. Call bell within reach.

## 2021-02-24 PROBLEM — R65.21 SEPTIC SHOCK (HCC): Status: ACTIVE | Noted: 2021-01-01

## 2021-02-24 PROBLEM — A41.9 SEPTIC SHOCK (HCC): Status: ACTIVE | Noted: 2021-01-01

## 2021-02-24 PROBLEM — J18.9 MULTIFOCAL PNEUMONIA: Status: ACTIVE | Noted: 2021-01-01

## 2021-02-24 NOTE — PROGRESS NOTES
: Assessment completed. Wife and daughter at bedside. Patient is on 5L NC. Agonal breathing. Daughter would like to be called with updates -- [phone] -- Dimitris Ramirez. Patient is on comfort measures. 0230: Patient still in room agonally breathing. 0330: Called . 1015: Called Physician. 9111: Attempted to call [facility]. Left voicemail. 80: Contacted by Iam Roberson [facility] Representative -- patient is unsuitable for tissue and eye donation due to age. [id number] Vito from [facility] also stated to ability to release patient due to age. 0: Alerted by  that  home is on the way. Patient will be bagged and appropriately tagged. 0600: Patient is being picked up to be taken to  home at this time. Patient's family has chosen [facility].

## 2021-02-24 NOTE — DISCHARGE SUMMARY
Discharge Summary Patient: Naif Burden MRN: [id number]  CSN: [id number] YOB: 1940  Age: 79 y.o. Sex: male DOA: 2/22/2021 LOS:  LOS: 1 day   Discharge Date:   
 
Primary Care Provider:  Belgica Bustamante MD 
 
Admission Diagnoses: CHF (congestive heart failure) ([facility] [I50.9] Hypoxia [R09.02] Dyspnea [R06.00] Discharge Diagnoses:   
Problem List as of 2/24/2021 Date Reviewed: 2/23/2021 Codes Class Noted - Resolved Septic shock (HCC) ICD-10-CM: A41.9, R65.21 ICD-9-CM: 038.9, 785.52, 995.92  2/24/2021 - Present Multifocal pneumonia ICD-10-CM: J18.9 ICD-9-CM: 254  2/24/2021 - Present CHF (congestive heart failure) (HCC) ICD-10-CM: I50.9 ICD-9-CM: 428.0  2/23/2021 - Present * (Principal) Acute hypoxemic respiratory failure ([facility] ICD-10-CM: J96.01 
ICD-9-CM: 518.81  2/23/2021 - Present Chronic atrial fibrillation (HCC) ICD-10-CM: I48.20 ICD-9-CM: 427.31  2/23/2021 - Present Thrombocytopenia ([facility] ICD-10-CM: D69.6 ICD-9-CM: 287.5  2/23/2021 - Present Anemia ICD-10-CM: D64.9 ICD-9-CM: 285.9  2/23/2021 - Present Acute urinary retention ICD-10-CM: R33.8 ICD-9-CM: 788.29  2/23/2021 - Present Hypotension ICD-10-CM: I95.9 ICD-9-CM: 458.9  2/23/2021 - Present Chronic osteomyelitis of right foot ([facility] ICD-10-CM: O27.323 ICD-9-CM: 730.17  1/22/2021 - Present MRSA infection ICD-10-CM: A49.02 
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 ([facility] ICD-10-CM: E11.621, N58.528 ICD-9-CM: 250.80, 707.15  1/14/2021 - Present Stage 3 chronic kidney disease ICD-10-CM: N18.30 ICD-9-CM: 585.3  1/14/2021 - Present Lymphedema ICD-10-CM: I89.0 ICD-9-CM: 317.3 2021 - Present Acute CHF (congestive heart failure) ([facility]) ICD-10-CM: I50.9 ICD-9-CM: 428.0  2021 - Present Cellulitis of leg, right ICD-10-CM: I32.895 ICD-9-CM: 682.6  2014 - Present Unspecified sleep apnea ICD-10-CM: G47.30 ICD-9-CM: 780.57  Unknown - Present ARF (acute renal failure) ([facility]) ICD-10-CM: N17.9 ICD-9-CM: 584.9  6/3/2013 - Present Chronic venous insufficiency ICD-10-CM: I87.2 ICD-9-CM: 459.81  6/3/2013 - Present Cellulitis and abscess of leg, except foot ICD-10-CM: L03.119, L02.419 ICD-9-CM: 682.6  2013 - Present HTN (hypertension) ICD-10-CM: I10 
ICD-9-CM: 401.9  2013 - Present Glaucoma ICD-10-CM: H40.9 ICD-9-CM: 365.9  2013 - Present Discharge Medications:    
Current Discharge Medication List  
  
 
 
Discharge Condition:  Procedures : overton catheter placement Consults: Urology, Pulmonary/Critical Care PHYSICAL EXAM  
Visit Vitals BP (!) 61/32 Pulse 65 Temp (!) 93 °F (33.9 °C) Resp 26 Ht 6' (1.829 m) Wt 99.8 kg (220 lb) SpO2 (!) 77% BMI 29.84 kg/m² Absent cardiac sounds for 1 minute, absent corneal and pupillary reflexes Admission HPI : Kamila Flores is a 79 y.o. 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 According to his wife, his daughter talked with him on the phone earlier today and noticed that he was short of breath so requested that [facility] send him to the emergency department. He was placed on high flow oxygen initially and then BiPAP after that. History is unobtainable from the patient due to BiPAP mask in place in respiratory distress.  
 
Hospital Course : He was evaluated in the ED and determined to be in overwhelming multiorgan failure due to septic shock. He was receiving IV antibiotic therapy for chronic MRSA osteomyelitis. He was alternated between high flow nasal cannula and BiPAP to maintain oxygenation. Urology placed a overton catheter which drained 2 liters of feculent material. A noncontrast CT of his chest/abdomen/pelvis was done revealing multifocal pneumonia. Rapid COVID testing was negative. His family was brought bedside to help make care decisions. He rapidly declined and his family decided to opt for comfort measures. Time of death 56. Disposition:  Minutes spent on discharge: 35 minutes Labs: Results:  
   
Chemistry Recent Labs  
  21 
1230 * 98 120* * 149* 150*  
K 3.6 3.7 3.3*  
* 121* 121* CO2 14* 17* 21 BUN 75* 75* 74* CREA 3.35* 3.37* 3.29* CA 7.7* 8.1* 7.8* AGAP 15 11 8 BUCR 22* 22* 22* AP 82 88 84  
TP 5.5* 6.0* 5.6* ALB 2.2* 2.4* 2.2*  
GLOB 3.3 3.6 3.4 AGRAT 0.7* 0.7* 0.6* CBC w/Diff Recent Labs  
  21 
1327 WBC 4.2* 8.8 8.1  
RBC 2.36* 2.32* 3.40* HGB 7.0* 7.1* 10.4* HCT 22.6* 22.0* 32.7*  
PLT 76* 84* 56* GRANS  --  76*  --   
LYMPH  --  15*  --   
EOS  --  2  --   
  
Cardiac Enzymes Recent Labs  
  21 CPK 58 64 CKND1 12.1* 14.4* Coagulation No results for input(s): PTP, INR, APTT, INREXT, INREXT in the last 72 hours. Lipid Panel No results found for: CHOL, CHOLPOCT, CHOLX, CHLST, CHOLV, 815400, HDL, HDLP, LDL, LDLC, DLDLP, 030895, VLDLC, VLDL, TGLX, TRIGL, TRIGP, TGLPOCT, CHHD, CHHDX  
BNP No results for input(s): BNPP in the last 72 hours. Liver Enzymes Recent Labs  
  21 
0448 TP 5.5* ALB 2.2* AP 82 Thyroid Studies No results found for: T4, T3U, TSH, TSHEXT, TSHEXT Significant Diagnostic Studies: Ir Insert Tunl Cvc W/o Port Over 5 [address] Result Date: 1/26/2021 PROCEDURE: Ultrasound and fluoroscopic guided right IJ/chest wall tunneled central venous catheter placement INDICATION:  Prolonged IV antibiotics. Central venous access requested. GUIDANCE: Ultrasound and fluoroscopic guidance was used to position (and confirm the position of) the needle / catheter. Image(s) saved in PACS: Ultrasound and fluoroscopic TECHNIQUE:  Informed consent obtained. Timeout performed at 1245 hrs. The procedure was performed using standard aseptic technique. Maximal sterile barrier technique was used for the procedure including sterile cap, sterile mask, sterile gown, sterile gloves and a large sterile sheet. Proper hand hygiene along with proper skin antiseptic was followed, utilizing 2% chlorhexidine solution for skin preparation. With respect to ultrasound guidance, sterile gel and sterile probe covers were used. Ultrasound evaluation of potential access sites was performed. After successfully identifying a patent vessel a permanent recording was created for the patient record. The right internal jugular vein was identified with ultrasound and found to be widely patent and compressible. 1% lidocaine with epinephrine utilized for local anesthesia. Using direct ultrasound guidance a micropuncture needle advanced into the right internal jugular vein and 018 guidewire advanced centrally. Over the wire the provided 6 Bangladeshi peel-away sheath/dilator was advanced into the lower SVC. A tract approximately 10 cm in length was anesthetized inferolateral to the venotomy puncture site and small incision made at this site in the right upper chest wall. The catheter length was estimated with the guidewire and catheter cut to appropriate length, 25 cm. The 6 Bangladeshi dual-lumen power line catheter was connected to the provided tunneling device and catheter tunneled under the subcutaneous tract out through the venotomy  site and pulled through.  Peel-away sheath inner obturator/wire removed through which the sheath was advanced and peel-away sheath removed. Tip of the catheter in the lower SVC near the atriocaval junction. No kinking identified along the catheter course. Both ports flushed and aspirated well. The venotomy site closed with Dermabond. 2-Ethilon suture utilized to secure the catheter to the right chest wall. Patient tolerated procedure well without immediate complication. Fluoroscopic time: 0.4 minutes Fluoroscopic dose (reference air kerma): 3 mGy MEDICATIONS: None. Patient already on broad spectrum antibiotics prior to coming to radiology department. No conscious sedation required. Successful placement of dual-lumen right IJ/chest wall tunneled central venous catheter. Ready for immediate use. Ct Chest Wo Cont Result Date: 2/23/2021 EXAM: CT of the chest without intravenous contrast. INDICATION:  \"resp failure. \" COMPARISON:  No prior CT is available for direct comparison. DOSE REDUCTION AND DICOM INFORMATION: One or more dose reduction techniques were used on this CT: automated exposure control, adjustment of the mAs and/or kVp according to patient size, and iterative reconstruction techniques. The specific techniques used on this CT exam have been documented in the patient's electronic medical record. Digital Imaging and Communications in Medicine (DICOM) format image data are available to nonaffiliated external healthcare facilities or entities on a secure, media free, reciprocally searchable basis with patient authorization for at least a 12-month period after this study. _______________ FINDINGS:      IMAGE QUALITY:  Examination of the mediastinal/hilar structures is limited by the lack of intravenous contrast.      CENTRAL AIRWAYS: Unremarkable. LUNGS AND PLEURAL SPACE: There is moderate patchy bilateral central predominant consolidative abnormality and marked dense bilateral lower lobe consolidation.  There are tiny dependent pleural effusions. HEART AND MEDIASTINUM/RAMON: Multichamber cardiac enlargement. Coronary artery calcific atherosclerosis. PARTIALLY IMAGED UPPER ABDOMEN:  Cholelithiasis. PARTIALLY IMAGED NECK BASE:   Small left thyroid nodule. SUPERFICIAL SOFT TISSUES: Anasarca. BONES:  Unremarkable. _______________ Bilateral multifocal pneumonia, moderate to severe by imaging. _______________ Ct Abd Pelv Wo Cont Result Date: 2/23/2021 EXAM: CT of the abdomen and pelvis without intravenous contrast. INDICATION: Abdominal pain. COMPARISON:  None available. DOSE REDUCTION AND DICOM INFORMATION: One or more dose reduction techniques were used on this CT: automated exposure control, adjustment of the mAs and/or kVp according to patient size, and iterative reconstruction techniques. The specific techniques used on this CT exam have been documented in the patient's electronic medical record. Digital Imaging and Communications in Medicine (DICOM) format image data are available to nonaffiliated external healthcare facilities or entities on a secure, media free, reciprocally searchable basis with patient authorization for at least a 12-month period after this study. _______________ FINDINGS:      IMAGE QUALITY:  Imaging of the solid organs and the bowel is limited by noncontrast technique. CHEST BASE: Multifocal pneumonia. Multichamber cardiac enlargement. LIVER: Unremarkable. GALLBLADDER: Cholelithiasis. BILE DUCTS: Unremarkable. PANCREAS: Unremarkable. SPLEEN: Unremarkable. GASTROINTESTINAL TRACT AND PERITONEAL CAVITY:           Lower esophagus:  Unremarkable. Hiatal hernia:  No.           Diverticulosis:  None significant. Ascites:  No.           Bowel obstruction:  No.           Pneumoperitoneum:  No.           Appendix/region:  Unremarkable. ADRENALS:           Right:  Unremarkable. Left:  Unremarkable.       KIDNEYS, URETERS, BLADDER:           Calculi:  None detected. Right:  Unremarkable. Left:  Unremarkable. Bladder:  Decompressed by Andujar catheter. VASCULATURE: Aortoiliac atherosclerosis without AAA. LYMPH NODES: Unremarkable. REPRODUCTIVE ORGANS: Unremarkable. SUPERFICIAL SOFT TISSUES: Anasarca. BONES:  Unremarkable. _______________ Anasarca. Cholelithiasis. Andujar catheter in place. _______________ Xr Chest [facility] Result Date: 2/22/2021 EXAM: Portable semiupright chest radiograph. INDICATION: \"SOB. \" COMPARISON: January 13, 2021 _______________ FINDINGS: The patient is right posterior rotator, which causes superimposition artifact in the right lung. There is bilateral interstitial prominence with an appearance of pulmonary edema that is more prominent in the right, favored to be an artifact of rotation as opposed to asymmetric pulmonary disease. There is low-grade hazy opacity in the bilateral perihilar and infrahilar regions likely reflects alveolar component to the edema, however, superimposed infection or other airspace abnormality remains a possibility. The findings are new. There is a possible right basal effusion versus artifact of superimposition. Right internal jugular central venous catheter has a standard appearance. There is an enlarged cardiac silhouette versus artifact of portable technique. _______________ Bilateral airspace and interstitial abnormality most suggestive of pulmonary edema, mild-to-moderate by imaging. The apparent asymmetry could be artifactual due to right posterior rotation. PA and lateral chest radiography could potentially clarify as an asymmetric abnormality would be more suggestive of infection or another cause of airspace abnormality. _______________ No results found for this or any previous visit.  
 
 
 
CC: Tiana Álvarez MD

## 2021-02-24 NOTE — PROGRESS NOTES
BEREAVEMENT NOTE  responded to the death of Joe Tucker, who is a 79 y.o., male, offering Spiritual Care to patient and family, see flow sheets for interventions. - Spouse at bedside. Daughter arrived to transport spouse home. Provided general grief support and EOL information.  
- Contacted  home at [phone]. Date of Death: 2021 Time of Death: 56 Pronounced by: Danuta Silva MD 
 
Extended Emergency Contact Information Primary Emergency Contact: Edith hSelby Address: [address] [place] [address] [place] Home Phone: [phone] Mobile Phone: [phone] Relation: Spouse Secondary Emergency Contact: Matthew Cadet Home Phone: [phone] Mobile Phone: [phone] Relation: Daughter Advance Care Plan: On file  Status: Patient is a [place]. [facility] contact information given to next of kin YES      NO  UNKNOWN Life Net   []        [x]    [] Eye Bank   [] [x] [] Medical Examiner  []        [x]  [] Going to [place]  []        [x] [] Autopsy   []        [x]        [] Sympathy Card  [x]        [] Bereavement Materials  []        [x] Business Card Provided  []        [x] Organ Donor/Anatomical Gift Preference: Unsuitable  Home 
Name: [facility] Phone: [phone] Chaplains will continue to follow family and will provide spiritual care as needed. Kathy Barreto M.A., M.Div., [address] Ph. [phone]

## 2021-02-24 NOTE — PROGRESS NOTES
RN paged that patient was  at 18 am. He was on comfort measures.  and wife were at bedside. Absent cardiac sounds x1 minute and absent pupillary and corneal reflexes.

## 2021-02-24 NOTE — PROGRESS NOTES
visited briefly with family of Gadiel Smith, who is a 79 y.o.,male. RN paged  to provide update on patient condition.  visited briefly with spouse. The  provided the following Interventions: 
Established relationship of care and support with wife. Spouse was attempting to rest.  
Chart reviewed. The following outcomes were achieved: 
Patient expressed gratitude for pastoral care visit. Assessment: 
There are no further spiritual or Gnosticist issues which require Spiritual Care Services interventions at this time. Plan: 
Chaplains will continue to follow and will provide pastoral care on an as needed/requested basis.  recommends bedside caregivers page  on duty if patient shows signs of acute spiritual or emotional distress. Pollo Brown M.A., M.Div., [address] Department [facility] Ph. [phone]

## 2021-02-24 NOTE — PROGRESS NOTES
Physician Progress Note PATIENTShey Rivera 
CSN #:                  [id number] :                       1940 ADMIT DATE:       2021 10:09 PM 
Chavo Gupta DATE:        2021 3:30 AM 
RESPONDING 
PROVIDER #:        Moni Jeong MD 
 
 
 
 
QUERY TEXT: 
 
Patient admitted with acute hypoxemic respiratory failure possibly due to hospital-acquired pneumonia. . Noted documentation of sepsis with shock on  progress note. In order to support the diagnosis of sepsis, please include additional clinical indicators in your documentation. Or please document if the diagnosis of sepsis has been ruled out after further study. The medical record reflects the following: 
Risk Factors: pneumonia; UTI; chronic osteomyelitis Clinical Indicators:  WBC 8.8, 4.2; Temp 97.8; Lactic acid 1.53; hypotension; tachypnea Treatment: O2; Abx; Levophed; blood cultures; Now comfort care Thank you, Debbie Pan RN/CCDS 
[phone] Options provided: 
-- Sepsis present as evidenced by, Please document evidence. -- Sepsis was ruled out after study -- Other - I will add my own diagnosis -- Disagree - Not applicable / Not valid -- Disagree - Clinically unable to determine / Unknown 
-- Refer to Clinical Documentation Reviewer PROVIDER RESPONSE TEXT: 
 
Sepsis is present as evidenced by shock, pneumonia, lactic acidosis Query created by: Claude Baas on 2021 1:16 PM 
 
 
Electronically signed by:  Moni Jeong MD 2021 6:31 AM

## 2021-02-25 NOTE — PROGRESS NOTES
EMR reviewed. Patient noted to be in restraints within 24 hours of the time of death. Information entered into internal log on: 2/25/21 at 0935.

## 2021-03-01 LAB
BACTERIA SPEC CULT: NORMAL
SERVICE CMNT-IMP: NORMAL

## 2022-12-21 NOTE — PROGRESS NOTES
Hospitalist Progress Note Patient: Rose Sotelo MRN: [id number]  CSN: [id number] YOB: 1940  Age: 79 y.o. Sex: male DOA: 2/22/2021 LOS:  LOS: 0 days Chief Complaint: 
 
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 
  
With severity of illness, MSOF, and delerioum, I called wife and asked her to come to ER to help make decisions for his goals of care He is at high risk for death due to acuity and severity of MSOF, resp failure, NRB/High flow 02, pressor support in place Lymphedema of LE, anasarca with abd wall edema Sores on bottom per ER nurse Septic shock Pneumonia UTI 
CKD Acute CHF Hypotension Metabolic encephalopathy Hypernatremia Anemia Thrombocytopenia Urinary obstruction/retention Wife and daughters have arrived and decided on comfort care, NO resuscitation, NO aggressive measures We will transfer to medical bed where they can be with him as he is expected to dec,ine acutely once support removed (pressors) 
 
meds initiated for comfort, no further IV stick, meds, blood draws, or interventions decided upon by 2 daughters and wife at bedside DNR/comfort  Care Total time: 45 min Counseling / coordination time:  
> 50% counseling / coordination 
 
 
 
  
Full code Disposition : 
Patient Active Problem List  
Diagnosis Code  Cellulitis and abscess of leg, except foot L03.119, L02.419  
 HTN (hypertension) I10  
 Glaucoma H40.9  Unspecified sleep apnea G47.30  ARF (acute renal failure) (HCC) N17.9  Chronic venous insufficiency I87.2  Cellulitis of leg, right L03.115  Lymphedema I89.0  Acute CHF (congestive heart failure) (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  
 CHF (congestive heart failure) ([facility]) I50.9  Acute hypoxemic respiratory failure ([facility]) J96.01  
 Chronic atrial fibrillation ([facility]) I48.20  Thrombocytopenia ([facility] D69.6  Anemia D64.9  Acute urinary retention R33.8  Hypotension I95.9 Subjective: He is stating \"please help me\" He does not know where he is States wifes name when asked Does not know year Appears to be in distress and pain Review of systems: As above, limited by acute medical status Vital signs/Intake and Output: 
Visit Vitals BP (!) 97/55 Pulse 76 Temp 97.8 °F (36.6 °C) Resp 26 Ht 6' (1.829 m) Wt 99.8 kg (220 lb) SpO2 91% BMI 29.84 kg/m² Current Shift:  No intake/output data recorded. Last three shifts:  02/21 1901 - 02/23 0700 In: 250 [I.V.:250] Out: 2000 [[id number]] Exam: 
 
General: weak frail edematous elderly WM, delerious, tachypneic, listing to right side Head/Neck: NCAT, supple, No masses, No lymphadenopathy CVS:Regular rate and rhythm, no M/R/G Lungs:Coarse BS with rales BL Abdomen: Soft, edema, diminished BS Extremities:LLE large edema, lymphedema, weeping of lower extremities, groin swelling, partial amputation of foot unilateral 
Neuro:delerious, uncomfortable, weak Labs: Results:  
   
Chemistry Recent Labs  
  02/23/21 
0448 02/22/21 
2211 02/22/21 
1230 * 98 120* * 149* 150*  
K 3.6 3.7 3.3*  
* 121* 121* CO2 14* 17* 21 BUN 75* 75* 74* CREA 3.35* 3.37* 3.29* CA 7.7* 8.1* 7.8* AGAP 15 11 8 BUCR 22* 22* 22* AP 82 88 84 TP 5.5* 6.0* 5.6* ALB 2.2* 2.4* 2.2*  
GLOB 3.3 3.6 3.4 AGRAT 0.7* 0.7* 0.6* CBC w/Diff Recent Labs  
  02/23/21 0448 02/22/21 2211 02/21/21 
1327 WBC 4.2* 8.8 8.1  
RBC 2.36* 2.32* 3.40* HGB 7.0* 7.1* 10.4* HCT 22.6* 22.0* 32.7*  
PLT 76* 84* 56* GRANS  --  76*  --   
LYMPH  --  15*  --   
EOS  --  2  --   
  
Cardiac Enzymes Recent Labs  
  02/23/21 0448 02/22/21 2211 CPK 58 64 CKND1 12.1* 14.4* Coagulation No results for input(s): PTP, INR, APTT, INREXT in the last 72 hours. Lipid Panel No results found for: CHOL, CHOLPOCT, CHOLX, CHLST, CHOLV, 895691, HDL, HDLP, LDL, LDLC, DLDLP, 924126, VLDLC, VLDL, TGLX, TRIGL, TRIGP, TGLPOCT, CHHD, CHHDX  
BNP No results for input(s): BNPP in the last 72 hours. Liver Enzymes Recent Labs  
  02/23/21 0448 TP 5.5* ALB 2.2* AP 82 Thyroid Studies No results found for: T4, T3U, TSH, TSHEXT Procedures/imaging: see electronic medical records for all procedures/Xrays and details which were not copied into this note but were reviewed prior to creation of Plan Trell David MD 
 Area H Indication Text: Tumors in this location are included in Area H (eyelids, eyebrows, nose, lips, chin, ear, pre-auricular, post-auricular, temple, genitalia, hands, feet, ankles and areola).  Tissue conservation is critical in these anatomic locations.

## 2024-09-03 NOTE — PERIOP NOTES
..     Spoke with Dr. Minal Levine about dressing from [address] [place] [address]. Around the wound, the ace wrap is soaked in blood and on the pad below. Orders stop Eliquis and reinforce dressing.

## (undated) DEVICE — PACK PROCEDURE SURG EXTREMITY CUST

## (undated) DEVICE — SYR LR LCK 1ML GRAD NSAF 30ML --

## (undated) DEVICE — GOWN,AURORA,NONRNF,XL,30/CS: Brand: MEDLINE

## (undated) DEVICE — GARMENT,MEDLINE,DVT,INT,CALF,MED, GEN2: Brand: MEDLINE

## (undated) DEVICE — SUTURE PROL SZ 3-0 L18IN NONABSORBABLE BLU L19MM PC-5 3/8 8632G

## (undated) DEVICE — BANDAGE COMPR W4INXL15FT BGE E SGL LAYERED CLP CLSR

## (undated) DEVICE — BANDAGE COMPR W3XL186IN SYNTH KNIT DSGN COHESIVE ELAS ECON

## (undated) DEVICE — DISPOSABLE TOURNIQUET CUFF SINGLE BLADDER, SINGLE PORT AND QUICK CONNECT CONNECTOR: Brand: COLOR CUFF

## (undated) DEVICE — SWAB CULT SGL AMIES W/O CHAR FOR THRT VAG SKIN HRT CULTSWAB

## (undated) DEVICE — INTENDED FOR TISSUE SEPARATION, AND OTHER PROCEDURES THAT REQUIRE A SHARP SURGICAL BLADE TO PUNCTURE OR CUT.: Brand: BARD-PARKER ® CARBON RIB-BACK BLADES

## (undated) DEVICE — GAUZE PKG STRP IODOFRM 1/4X5YD --

## (undated) DEVICE — SUTURE VCRL + SZ 3-0 L36IN ABSRB UD L36MM CT-1 1/2 CIR VCP944H

## (undated) DEVICE — SOL IRRIGATION INJ NACL 0.9% 500ML BTL

## (undated) DEVICE — PREP SKN CHLRAPRP APL 26ML STR --

## (undated) DEVICE — SPONGE LAP 18X18IN STRL -- 5/PK

## (undated) DEVICE — SUT ETHLN 5-0 18IN PC1 BLK --

## (undated) DEVICE — MASTISOL ADHESIVE LIQ 2/3ML

## (undated) DEVICE — BLADE SAW SAG 73X12.5X1.27 MM FOR LG BNE 2108 SER

## (undated) DEVICE — STRAP,POSITIONING,KNEE/BODY,FOAM,4X60": Brand: MEDLINE

## (undated) DEVICE — INTENDED FOR TISSUE SEPARATION, AND OTHER PROCEDURES THAT REQUIRE A SHARP SURGICAL BLADE TO PUNCTURE OR CUT.: Brand: BARD-PARKER SAFETY BLADES SIZE 15, STERILE

## (undated) DEVICE — BANDAGE COMPR W4INXL10YD WHITE/BEIGE E MTRX HK LOOP CLSR

## (undated) DEVICE — SUTURE VCRL SZ 3-0 L27IN ABSRB UD L24MM PS-1 3/8 CIR PRIM J936H

## (undated) DEVICE — SWAB CULT LIQ STUART AGR AERB MOD IN BRK SGL RAYON TIP PLAS 220099] BECTON DICKINSON MICRO]

## (undated) DEVICE — DRESSING,GAUZE,XEROFORM,CURAD,5"X9",ST: Brand: CURAD

## (undated) DEVICE — 3-0 COATED VICRYL PLUS UNDYED 1X27" SH --

## (undated) DEVICE — SOLUTION LACTATED RINGERS INJECTION USP

## (undated) DEVICE — TRAY PREP DRY W/ PREM GLV 2 APPL 6 SPNG 2 UNDPD 1 OVERWRAP

## (undated) DEVICE — BANDAGE,GAUZE,BULKEE II,4.5"X4.1YD,STRL: Brand: MEDLINE

## (undated) DEVICE — HANDPIECE SET WITH HIGH FLOW TIP AND SUCTION TUBE: Brand: INTERPULSE

## (undated) DEVICE — SUT MONOCRYL PLUS UD 4-0 --

## (undated) DEVICE — DRESSING PETRO W3XL8IN N ADH OIL EMUL GZ CURAD

## (undated) DEVICE — DRESSING,GAUZE,XEROFORM,CURAD,1"X8",ST: Brand: CURAD

## (undated) DEVICE — SUTURE ETHLN SZ 4-0 L18IN NONABSORBABLE BLK L19MM PS-2 3/8 1667H

## (undated) DEVICE — DRAPE TWL SURG 16X26IN BLU ORB04] ALLCARE INC]

## (undated) DEVICE — PAD,ABDOMINAL,5"X9",ST,LF,25/BX: Brand: MEDLINE INDUSTRIES, INC.

## (undated) DEVICE — BNDG,ELSTC,MATRIX,STRL,6"X5YD,LF,HOOK&LP: Brand: MEDLINE

## (undated) DEVICE — SURGIFOAM SPNG SZ 100

## (undated) DEVICE — ZIMMER® STERILE DISPOSABLE TOURNIQUET CUFF WITH PROTECTIVE SLEEVE AND PLC, SINGLE PORT, SINGLE BLADDER, 34 IN. (86 CM)

## (undated) DEVICE — SPONGE GZ W4XL4IN COT 12 PLY TYP VII WVN C FLD DSGN

## (undated) DEVICE — STRIP SKIN CLSR W0.25XL4IN WHT SPUNBOUND FBR NYL HI ADH

## (undated) DEVICE — PRECISION (9.0 X 0.51 X 25.0MM)

## (undated) DEVICE — TUBERCULIN SAFETY SYRINGE WITH NEEDLE: Brand: MONOJECT